# Patient Record
Sex: FEMALE | Race: WHITE | Employment: OTHER | URBAN - METROPOLITAN AREA
[De-identification: names, ages, dates, MRNs, and addresses within clinical notes are randomized per-mention and may not be internally consistent; named-entity substitution may affect disease eponyms.]

---

## 2017-08-16 ENCOUNTER — HOSPITAL ENCOUNTER (OUTPATIENT)
Dept: ULTRASOUND IMAGING | Age: 57
Discharge: HOME | End: 2017-08-16
Attending: INTERNAL MEDICINE
Payer: COMMERCIAL

## 2017-08-16 DIAGNOSIS — R10.13 EPIGASTRIC PAIN: ICD-10-CM

## 2017-08-16 PROCEDURE — 76700 US EXAM ABDOM COMPLETE: CPT

## 2017-08-22 NOTE — PROGRESS NOTES
Patient stated she is still having left sided pain that goes into her back. Pt stated she medication has provided no relief.

## 2017-10-26 ENCOUNTER — HOSPITAL ENCOUNTER (OUTPATIENT)
Dept: LAB | Age: 57
Discharge: HOME | End: 2017-10-26

## 2017-10-26 PROCEDURE — 88312 SPECIAL STAINS GROUP 1: CPT | Performed by: INTERNAL MEDICINE

## 2017-10-26 PROCEDURE — 88305 TISSUE EXAM BY PATHOLOGIST: CPT | Performed by: INTERNAL MEDICINE

## 2018-01-22 PROBLEM — J06.9 URI, ACUTE: Status: ACTIVE | Noted: 2018-01-22

## 2019-04-01 ENCOUNTER — HOSPITAL ENCOUNTER (OUTPATIENT)
Dept: MAMMOGRAPHY | Age: 59
Discharge: HOME | End: 2019-04-01
Attending: INTERNAL MEDICINE
Payer: COMMERCIAL

## 2019-04-01 DIAGNOSIS — M89.9 DISORDER OF BONE: ICD-10-CM

## 2019-04-01 PROCEDURE — 77080 DXA BONE DENSITY AXIAL: CPT

## 2019-12-03 ENCOUNTER — HOSPITAL ENCOUNTER (OUTPATIENT)
Dept: GENERAL RADIOLOGY | Age: 59
Discharge: HOME | End: 2019-12-03
Attending: ORTHOPAEDIC SURGERY
Payer: COMMERCIAL

## 2019-12-03 VITALS
HEART RATE: 82 BPM | RESPIRATION RATE: 16 BRPM | WEIGHT: 145 LBS | OXYGEN SATURATION: 98 % | SYSTOLIC BLOOD PRESSURE: 122 MMHG | HEIGHT: 66 IN | TEMPERATURE: 98.7 F | DIASTOLIC BLOOD PRESSURE: 78 MMHG | BODY MASS INDEX: 23.3 KG/M2

## 2019-12-03 DIAGNOSIS — Z96.612 PAIN DUE TO LEFT SHOULDER JOINT PROSTHESIS (HCC): ICD-10-CM

## 2019-12-03 DIAGNOSIS — T84.84XA PAIN DUE TO LEFT SHOULDER JOINT PROSTHESIS (HCC): ICD-10-CM

## 2019-12-03 DIAGNOSIS — Z96.612 PRESENCE OF LEFT ARTIFICIAL SHOULDER JOINT: ICD-10-CM

## 2019-12-03 LAB
APPEARANCE FLD: NORMAL
COLOR FLD: NORMAL
LYMPHOCYTES NFR BRONCH MANUAL: 24 %
NEUTROPHILS NFR BRONCH MANUAL: 76 %
NUC CELL # FLD: 3 /CU MM
RBC # FLD: NORMAL /CU MM
SPECIMEN SOURCE FLD: NORMAL

## 2019-12-03 PROCEDURE — 20610 DRAIN/INJ JOINT/BURSA W/O US: CPT

## 2019-12-03 PROCEDURE — 89050 BODY FLUID CELL COUNT: CPT

## 2019-12-03 PROCEDURE — 87205 SMEAR GRAM STAIN: CPT

## 2019-12-03 PROCEDURE — 74011000250 HC RX REV CODE- 250: Performed by: ORTHOPAEDIC SURGERY

## 2019-12-03 PROCEDURE — 87075 CULTR BACTERIA EXCEPT BLOOD: CPT

## 2019-12-03 RX ORDER — LIDOCAINE HYDROCHLORIDE 20 MG/ML
0.2 INJECTION, SOLUTION INFILTRATION; PERINEURAL
Status: COMPLETED | OUTPATIENT
Start: 2019-12-03 | End: 2019-12-03

## 2019-12-03 RX ORDER — BISMUTH SUBSALICYLATE 262 MG
1 TABLET,CHEWABLE ORAL DAILY
COMMUNITY

## 2019-12-03 RX ADMIN — LIDOCAINE HYDROCHLORIDE 5 ML: 20 INJECTION, SOLUTION INFILTRATION; PERINEURAL at 13:50

## 2019-12-05 LAB
BACTERIA SPEC CULT: NORMAL
GRAM STN SPEC: NORMAL
GRAM STN SPEC: NORMAL
SERVICE CMNT-IMP: NORMAL

## 2019-12-09 ENCOUNTER — HOSPITAL ENCOUNTER (OUTPATIENT)
Dept: LAB | Age: 59
Discharge: HOME | End: 2019-12-09
Payer: COMMERCIAL

## 2019-12-09 LAB
BASOPHILS # BLD: 0.1 K/UL (ref 0–0.2)
BASOPHILS NFR BLD: 1 % (ref 0–2)
CRP SERPL-MCNC: <0.3 MG/DL (ref 0–0.9)
DIFFERENTIAL METHOD BLD: ABNORMAL
EOSINOPHIL # BLD: 0.1 K/UL (ref 0–0.8)
EOSINOPHIL NFR BLD: 1 % (ref 0.5–7.8)
ERYTHROCYTE [DISTWIDTH] IN BLOOD BY AUTOMATED COUNT: 13.8 % (ref 11.9–14.6)
ERYTHROCYTE [SEDIMENTATION RATE] IN BLOOD: 23 MM/HR (ref 0–30)
HCT VFR BLD AUTO: 36.1 % (ref 35.8–46.3)
HGB BLD-MCNC: 11.8 G/DL (ref 11.7–15.4)
IMM GRANULOCYTES # BLD AUTO: 0 K/UL (ref 0–0.5)
IMM GRANULOCYTES NFR BLD AUTO: 0 % (ref 0–5)
LYMPHOCYTES # BLD: 2.4 K/UL (ref 0.5–4.6)
LYMPHOCYTES NFR BLD: 43 % (ref 13–44)
MCH RBC QN AUTO: 31.1 PG (ref 26.1–32.9)
MCHC RBC AUTO-ENTMCNC: 32.7 G/DL (ref 31.4–35)
MCV RBC AUTO: 95.3 FL (ref 79.6–97.8)
MONOCYTES # BLD: 0.4 K/UL (ref 0.1–1.3)
MONOCYTES NFR BLD: 7 % (ref 4–12)
NEUTS SEG # BLD: 2.6 K/UL (ref 1.7–8.2)
NEUTS SEG NFR BLD: 47 % (ref 43–78)
NRBC # BLD: 0 K/UL (ref 0–0.2)
PLATELET # BLD AUTO: 277 K/UL (ref 150–450)
PMV BLD AUTO: 9.7 FL (ref 9.4–12.3)
RBC # BLD AUTO: 3.79 M/UL (ref 4.05–5.2)
WBC # BLD AUTO: 5.4 K/UL (ref 4.3–11.1)

## 2019-12-09 PROCEDURE — 86140 C-REACTIVE PROTEIN: CPT

## 2019-12-09 PROCEDURE — 85652 RBC SED RATE AUTOMATED: CPT

## 2019-12-09 PROCEDURE — 85025 COMPLETE CBC W/AUTO DIFF WBC: CPT

## 2019-12-09 PROCEDURE — 36415 COLL VENOUS BLD VENIPUNCTURE: CPT

## 2019-12-27 LAB
BACTERIA SPEC CULT: NORMAL
SERVICE CMNT-IMP: NORMAL

## 2020-01-30 ENCOUNTER — HOSPITAL ENCOUNTER (OUTPATIENT)
Dept: SURGERY | Age: 60
Discharge: HOME | End: 2020-01-30
Payer: COMMERCIAL

## 2020-01-30 VITALS
HEIGHT: 66 IN | TEMPERATURE: 96.9 F | SYSTOLIC BLOOD PRESSURE: 112 MMHG | BODY MASS INDEX: 24.91 KG/M2 | RESPIRATION RATE: 18 BRPM | OXYGEN SATURATION: 95 % | DIASTOLIC BLOOD PRESSURE: 56 MMHG | WEIGHT: 155 LBS | HEART RATE: 72 BPM

## 2020-01-30 PROBLEM — Z96.612 PAIN DUE TO LEFT SHOULDER JOINT PROSTHESIS (HCC): Status: ACTIVE | Noted: 2020-01-30

## 2020-01-30 PROBLEM — T84.84XA PAIN DUE TO LEFT SHOULDER JOINT PROSTHESIS (HCC): Status: ACTIVE | Noted: 2020-01-30

## 2020-01-30 PROBLEM — Z96.612 S/P REVERSE TOTAL SHOULDER ARTHROPLASTY, LEFT: Status: ACTIVE | Noted: 2020-01-30

## 2020-01-30 LAB
ALBUMIN SERPL-MCNC: 4 G/DL (ref 3.5–5)
ALBUMIN/GLOB SERPL: 1.1 {RATIO} (ref 1.2–3.5)
ALP SERPL-CCNC: 77 U/L (ref 50–136)
ALT SERPL-CCNC: 21 U/L (ref 12–65)
ANION GAP SERPL CALC-SCNC: 4 MMOL/L (ref 7–16)
APPEARANCE UR: CLEAR
APTT PPP: 28.5 SEC (ref 24.7–39.8)
AST SERPL-CCNC: 19 U/L (ref 15–37)
BACTERIA SPEC CULT: ABNORMAL
BILIRUB SERPL-MCNC: 0.3 MG/DL (ref 0.2–1.1)
BILIRUB UR QL: NEGATIVE
BUN SERPL-MCNC: 14 MG/DL (ref 6–23)
CALCIUM SERPL-MCNC: 9.4 MG/DL (ref 8.3–10.4)
CHLORIDE SERPL-SCNC: 105 MMOL/L (ref 98–107)
CO2 SERPL-SCNC: 27 MMOL/L (ref 21–32)
COLOR UR: YELLOW
CREAT SERPL-MCNC: 0.88 MG/DL (ref 0.6–1)
CRP SERPL-MCNC: <0.3 MG/DL (ref 0–0.9)
ERYTHROCYTE [DISTWIDTH] IN BLOOD BY AUTOMATED COUNT: 13.4 % (ref 11.9–14.6)
ERYTHROCYTE [SEDIMENTATION RATE] IN BLOOD: 22 MM/HR (ref 0–30)
GLOBULIN SER CALC-MCNC: 3.7 G/DL (ref 2.3–3.5)
GLUCOSE SERPL-MCNC: 102 MG/DL (ref 65–100)
GLUCOSE UR STRIP.AUTO-MCNC: NEGATIVE MG/DL
HCT VFR BLD AUTO: 35.4 % (ref 35.8–46.3)
HGB BLD-MCNC: 11.8 G/DL (ref 11.7–15.4)
HGB UR QL STRIP: NEGATIVE
INR PPP: 0.9
KETONES UR QL STRIP.AUTO: NEGATIVE MG/DL
LEUKOCYTE ESTERASE UR QL STRIP.AUTO: NEGATIVE
MAGNESIUM SERPL-MCNC: 2.3 MG/DL (ref 1.8–2.4)
MCH RBC QN AUTO: 31.4 PG (ref 26.1–32.9)
MCHC RBC AUTO-ENTMCNC: 33.3 G/DL (ref 31.4–35)
MCV RBC AUTO: 94.1 FL (ref 79.6–97.8)
NITRITE UR QL STRIP.AUTO: NEGATIVE
NRBC # BLD: 0 K/UL (ref 0–0.2)
PH UR STRIP: 5.5 [PH] (ref 5–9)
PLATELET # BLD AUTO: 282 K/UL (ref 150–450)
PMV BLD AUTO: 9.7 FL (ref 9.4–12.3)
POTASSIUM SERPL-SCNC: 3.6 MMOL/L (ref 3.5–5.1)
PROT SERPL-MCNC: 7.7 G/DL (ref 6.3–8.2)
PROT UR STRIP-MCNC: NEGATIVE MG/DL
PROTHROMBIN TIME: 12.7 SEC (ref 11.7–14.5)
RBC # BLD AUTO: 3.76 M/UL (ref 4.05–5.2)
SERVICE CMNT-IMP: ABNORMAL
SODIUM SERPL-SCNC: 136 MMOL/L (ref 136–145)
SP GR UR REFRACTOMETRY: 1.02 (ref 1–1.02)
UROBILINOGEN UR QL STRIP.AUTO: 0.2 EU/DL (ref 0.2–1)
WBC # BLD AUTO: 6 K/UL (ref 4.3–11.1)

## 2020-01-30 PROCEDURE — 85027 COMPLETE CBC AUTOMATED: CPT

## 2020-01-30 PROCEDURE — 87641 MR-STAPH DNA AMP PROBE: CPT

## 2020-01-30 PROCEDURE — 86140 C-REACTIVE PROTEIN: CPT

## 2020-01-30 PROCEDURE — 85610 PROTHROMBIN TIME: CPT

## 2020-01-30 PROCEDURE — 83735 ASSAY OF MAGNESIUM: CPT

## 2020-01-30 PROCEDURE — 85730 THROMBOPLASTIN TIME PARTIAL: CPT

## 2020-01-30 PROCEDURE — 85652 RBC SED RATE AUTOMATED: CPT

## 2020-01-30 PROCEDURE — 80053 COMPREHEN METABOLIC PANEL: CPT

## 2020-01-30 PROCEDURE — 77030027138 HC INCENT SPIROMETER -A

## 2020-01-30 PROCEDURE — 81003 URINALYSIS AUTO W/O SCOPE: CPT

## 2020-01-30 RX ORDER — ACETAMINOPHEN 325 MG/1
500 TABLET ORAL
COMMUNITY

## 2020-01-30 NOTE — PERIOP NOTES
The below lab results are within anesthesia limits, no further action is required. Recent Results (from the past 12 hour(s))   CBC W/O DIFF    Collection Time: 01/30/20  1:13 PM   Result Value Ref Range    WBC 6.0 4.3 - 11.1 K/uL    RBC 3.76 (L) 4.05 - 5.2 M/uL    HGB 11.8 11.7 - 15.4 g/dL    HCT 35.4 (L) 35.8 - 46.3 %    MCV 94.1 79.6 - 97.8 FL    MCH 31.4 26.1 - 32.9 PG    MCHC 33.3 31.4 - 35.0 g/dL    RDW 13.4 11.9 - 14.6 %    PLATELET 480 403 - 938 K/uL    MPV 9.7 9.4 - 12.3 FL    ABSOLUTE NRBC 0.00 0.0 - 0.2 K/uL   MAGNESIUM    Collection Time: 01/30/20  1:13 PM   Result Value Ref Range    Magnesium 2.3 1.8 - 2.4 mg/dL   METABOLIC PANEL, COMPREHENSIVE    Collection Time: 01/30/20  1:13 PM   Result Value Ref Range    Sodium 136 136 - 145 mmol/L    Potassium 3.6 3.5 - 5.1 mmol/L    Chloride 105 98 - 107 mmol/L    CO2 27 21 - 32 mmol/L    Anion gap 4 (L) 7 - 16 mmol/L    Glucose 102 (H) 65 - 100 mg/dL    BUN 14 6 - 23 MG/DL    Creatinine 0.88 0.6 - 1.0 MG/DL    GFR est AA >60 >60 ml/min/1.73m2    GFR est non-AA >60 >60 ml/min/1.73m2    Calcium 9.4 8.3 - 10.4 MG/DL    Bilirubin, total 0.3 0.2 - 1.1 MG/DL    ALT (SGPT) 21 12 - 65 U/L    AST (SGOT) 19 15 - 37 U/L    Alk.  phosphatase 77 50 - 136 U/L    Protein, total 7.7 6.3 - 8.2 g/dL    Albumin 4.0 3.5 - 5.0 g/dL    Globulin 3.7 (H) 2.3 - 3.5 g/dL    A-G Ratio 1.1 (L) 1.2 - 3.5     PROTHROMBIN TIME + INR    Collection Time: 01/30/20  1:13 PM   Result Value Ref Range    Prothrombin time 12.7 11.7 - 14.5 sec    INR 0.9     PTT    Collection Time: 01/30/20  1:13 PM   Result Value Ref Range    aPTT 28.5 24.7 - 39.8 SEC   URINALYSIS W/ RFLX MICROSCOPIC    Collection Time: 01/30/20  1:13 PM   Result Value Ref Range    Color YELLOW      Appearance CLEAR      Specific gravity 1.025 (H) 1.001 - 1.023      pH (UA) 5.5 5.0 - 9.0      Protein NEGATIVE  NEG mg/dL    Glucose NEGATIVE  mg/dL    Ketone NEGATIVE  NEG mg/dL    Bilirubin NEGATIVE  NEG      Blood NEGATIVE  NEG Urobilinogen 0.2 0.2 - 1.0 EU/dL    Nitrites NEGATIVE  NEG      Leukocyte Esterase NEGATIVE  NEG     C REACTIVE PROTEIN, QT    Collection Time: 01/30/20  1:13 PM   Result Value Ref Range    C-Reactive protein <0.3 0.0 - 0.9 mg/dL

## 2020-01-30 NOTE — PERIOP NOTES
Patient verified name and . Order for consent found in EHR and matches case posting; patient verified. Type 3 surgery, PAT joint assessment complete. Labs per surgeon: CBC,Mg, CMP, PT/PTT, UA, C Reactive Protein, Sed Rate; results pending. Hemoglobin A1c DOS and POC glucose DOS; orders signed and held in EHR. T&C 2 units; order signed and held in EHR. Labs per anesthesia protocol: no additional  EKG: None per anesthesia protocol. Patient instructed to come to outpatient lab, [place] [address] [address], on 20 (Wednesday), between 0800 and 1600 to have lab work completed. Do NOT remove green armband once placed. Patient verbalized understanding. Patient received written and verbal instructions on incentive spirometer use. Patient returned successful demonstration. Patient instructed to bring incentive spirometer to the hospital on the DOS. MRSA/MSSA swab collected; pharmacy to review and dose antibiotic as appropriate. Hospital approved surgical skin cleanser and instructions to return bottle on DOS given per hospital policy. Patient provided with handouts including Guide to Surgery, Pain Management, Hand Hygiene, Blood Transfusion Education, and [facility] Anesthesia Brochure. Patient answered medical/surgical history questions at their best of ability. All prior to admission medications documented in [facility]. Original medication prescription bottle NOT visualized during patient appointment. Patient instructed to hold all vitamins, supplements, herbals 3 weeks prior to surgery and NSAIDS 5 days prior to surgery. Patient teach back successful and patient demonstrates knowledge of instruction.

## 2020-01-30 NOTE — BRIEF OP NOTE
BRIEF OPERATIVE NOTE    Date of Procedure: 2/6/2020     Preoperative Diagnosis:  PAINFUL REVERSE LEFT TOTAL SHOULDER ARTHROPLASTY     Postoperative Diagnosis:  SAME    Procedure(s): 1. INCISION, DEBRIDEMENT, REMOVAL IMPLANT LEFT SHOULDER \"REGINA REVERSE LEFT TSA\" WITH PROXIMAL HUMERAL OSTEOTOMY     2.  REVISION REVERSE LEFT TOTAL SHOULDER ARTHROPLASTY WITH LONG STEM DELTA EXTEND PROSTHESIS, BICEPS TENODESIS, LATISSIMUS DORSI AND TERES MAJOR TENDON TRANSFERS    Surgeon(s) and Role:     * Angel Diaz MD - Primary         Assistant Staff:  Izaiah Lombardi NP      Surgical Staff:  Circ-1: (Unknown)  Scrub Tech-1: (Unknown)  Scrub Tech-2: (Unknown)  Scrub Tech-3: (Unknown)  No case tracking events are documented in the log. Anesthesia:  GENERAL WITH INTERSCALENE BLOCK    Estimated Blood Loss: 300 cc. Complications: NONE    Implants:   Implant Name Type Inv.  Item Serial No.  Lot No. LRB No. Used Action   COMP METAGLENE LNG PEG +10MM -- DELTA XTEND - [id number]  COMP METAGLENE LNG PEG +10MM -- DELTA XTEND  [facility] [employer] [id number] Left 1 Implanted   COMPNT GLENOSPHRE ECC D42MM +4 -- DELTA XTEND - [id number]  COMPNT GLENOSPHRE ECC D42MM +4 -- DELTA XTEND  [facility] [employer] [id number] Left 1 Implanted   CEMENT BONE SIMPLEX P 1/PACK - [id number]  CEMENT BONE SIMPLEX P 1/PACK  [employer] [facility] [id number] Left 2 Implanted   SPACER HUM +9MM DELTA XTEND --  - [id number]  SPACER HUM +9MM DELTA XTEND --   [facility] [employer] [id number] Left 1 Implanted   STEM HUM MBLOC EPI LNG SZ2 10M -- DELTA XTEND - [id number]  STEM HUM MBLOC EPI LNG SZ2 10M -- DELTA XTEND  [facility] [employer] [id number] Left 1 Implanted   RSTRCTR ETHAN BNE BIOABSRB 8MM -- Ross Poor  RSTRCTR ETHAN BNE BIOABSRB 8MM -- BIOSTOP G  [facility] [employer] [id number] Left 1 Implanted   SPACER HUM +9MM DELTA XTEND --  - [id number]  SPACER HUM +9MM DELTA XTEND --   [employer] [id number] Left 1 Implanted   SPACER HUM +9MM Blade Salgado --  - [id number]  SPACER HUM +9MM DELTA Lela Rosemarie   [facility] [employer]THOPED[employer] [id number] Left 1 Implanted   CUP HUM STD DELT PE 42+9MM -- Blade Salgado - [id number]  CUP HUM STD DELT PE 42+9MM -- [id number] [id number] Left 1 Implanted       Ru Rivera MD

## 2020-01-30 NOTE — H&P
[facility] HISTORY AND PHYSICAL    Subjective:     Patient is a 63 y.o. RHD FEMALE WITH LEFT SHOULDER PAIN. SEE OFFICE NOTE. Patient Active Problem List    Diagnosis Date Noted    Pain due to left shoulder joint prosthesis ([address] 01/30/2020    S/P reverse total shoulder arthroplasty, left 01/30/2020    URI, acute 01/22/2018    Hypertrophy of nasal turbinates     Deviated nasal septum     Chronic sinusitis     TMJ (temporomandibular joint disorder)     Tinnitus     SNHL (sensorineural hearing loss)     Otalgia     Migraine headache     High frequency hearing loss     Eustachian tube dysfunction     Allergic rhinitis 03/07/2013    Fatigue 03/07/2013     Past Medical History:   Diagnosis Date    Allergic rhinitis 3/7/2013    Chronic sinusitis     Deviated nasal septum     Eustachian tube dysfunction     Fatigue 3/7/2013    High frequency hearing loss     Hypertrophy of nasal turbinates     Migraine headache     Otalgia     SNHL (sensorineural hearing loss)     Tinnitus     intermittent    TMJ (temporomandibular joint disorder)       Past Surgical History:   Procedure Laterality Date    HX GYN  2009    uterine ablation      Prior to Admission medications    Medication Sig Start Date End Date Taking? Authorizing Provider   multivitamin (ONE A DAY) tablet Take 1 Tab by mouth daily. Provider, Historical   calcium carb/vitamin D2/soyb (ONE-A-DAY BONE STRENGTH PO) Take  by mouth. Provider, Historical   nutritional supplement (BONES & JOINTS PO) Take  by mouth. Provider, Historical     Allergies   Allergen Reactions    Codeine Other (comments)     Headaches      Social History     Tobacco Use    Smoking status: Never Smoker    Smokeless tobacco: Never Used   Substance Use Topics    Alcohol use:  Yes     Alcohol/week: 0.8 standard drinks     Types: 1 Glasses of wine per week     Comment: monthly; occasional      Family History   Problem Relation Age of Onset    Hypertension Father     Heart Disease Father     Elevated Lipids Father     Diabetes Father     Cancer Father         leukemia/lung-smoker    Hypertension Brother     Diabetes Brother     Elevated Lipids Brother     Liver Disease Mother         autoimmune hepatitis      Review of Systems  A comprehensive review of systems was negative except for that written in the HPI. Objective:     No data found. Visit Vitals  Ht 5' 6\" (1.676 m)   Wt 69.9 kg (154 lb)   BMI 24.86 kg/m²     General:  Alert, cooperative, no distress, appears stated age. Head:  Normocephalic, without obvious abnormality, atraumatic. Back:   Symmetric, no curvature. ROM normal. No CVA tenderness. Lungs:   Clear to auscultation bilaterally. Chest wall:  No tenderness or deformity. Heart:  Regular rate and rhythm, S1, S2 normal, no murmur, click, rub or gallop. Extremities: Extremities normal, atraumatic, no cyanosis or edema. Pulses: 2+ and symmetric all extremities. Skin: Skin color, texture, turgor normal. No rashes or lesions. Lymph nodes: Cervical, supraclavicular, and axillary nodes normal.   Neurologic: CNII-XII intact. Normal strength, sensation and reflexes throughout. Assessment:     Principal Problem:    Pain due to left shoulder joint prosthesis (HCC) (1/30/2020)    Active Problems:    S/P reverse total shoulder arthroplasty, left (1/30/2020)        Plan:     The various methods of treatment have been discussed with the patient and family. PATIENT HAS EXHAUSTED NON-OPERATIVE MODALITIES     After consideration of risks, benefits and other options for treatment, the patient has consented to surgical intervention.     SEE OFFICE NOTE    Angus Leon MD

## 2020-01-30 NOTE — PERIOP NOTES
PLEASE CONTINUE TAKING ALL PRESCRIPTION MEDICATIONS UP TO THE DAY OF SURGERY UNLESS OTHERWISE DIRECTED BELOW. DISCONTINUE all vitamins, herbals and supplements 21 days prior to surgery. DISCONTINUE Non-Steriodal Anti-Inflammatory (NSAIDS) such as Advil, Ibuprofen, and Aleve 5 days prior to surgery. Home Medications to HOLD      All vitamins, supplements, and herbals stop NOW. All NSAIDs such as Advil, Aleve, Ibuprofen, Diclofenac, Naproxen, etc. Stop 5 days prior to surgery. Home Medications to take  the day of surgery   Tylenol if needed        Comments   Please bring bottle of soap (Dynahex) and incentive spirometer to the hospital.    Please come to outpatient lab, [address], on 2/5/20 (Wednesday) between 0800 and 1600 to have lab work completed. Do NOT remove green armband once placed. Please do not bring home medications with you on the day of surgery unless otherwise directed by your nurse. If you are instructed to bring home medications, please give them to your nurse as they will be administered by the nursing staff. If you have any questions, please call [facility] [phone] or [facility] [phone]. Copy of above instructions given to patient.

## 2020-02-04 NOTE — ADVANCED PRACTICE NURSE
Total Joint Surgery Preoperative Chart Review      Patient ID:  Frances Roman  [id number]  64 y.o.  1960  Surgeon: Dr. Karthik Garcia  Date of Surgery: 2/6/2020  Procedure: Total Left Shoulder Arthroplasty  Primary Care Physician: Dinora Cruz [place] [phone]  Specialty Physician(s):      Subjective:   Frances Roman is a 63 y.o. WHITE OR  female who presents for preoperative evaluation for Total Left Shoulder arthroplasty. This is a preoperative chart review note based on data collected by the nurse at the surgical Pre-Assessment visit. Past Medical History:   Diagnosis Date    Allergic rhinitis 3/7/2013    Chronic sinusitis     Deviated nasal septum     Eustachian tube dysfunction     Fatigue 3/7/2013    High frequency hearing loss     Hypertrophy of nasal turbinates     Migraine headache     Otalgia     SNHL (sensorineural hearing loss)     Tinnitus     intermittent    TMJ (temporomandibular joint disorder)       Past Surgical History:   Procedure Laterality Date    HX GYN  2009    uterine ablation    HX ORTHOPAEDIC Left 2018    arm/shoulder     Family History   Problem Relation Age of Onset    Hypertension Father     Heart Disease Father     Elevated Lipids Father     Diabetes Father     Cancer Father         leukemia/lung-smoker    Hypertension Brother     Diabetes Brother     Elevated Lipids Brother     Liver Disease Mother         autoimmune hepatitis      Social History     Tobacco Use    Smoking status: Never Smoker    Smokeless tobacco: Never Used   Substance Use Topics    Alcohol use: Never     Alcohol/week: 0.8 standard drinks     Types: 1 Glasses of wine per week     Frequency: Never       Prior to Admission medications    Medication Sig Start Date End Date Taking? Authorizing Provider   acetaminophen (TYLENOL) 325 mg tablet Take  by mouth every four (4) hours as needed for Pain.    Yes Provider, Historical   multivitamin (ONE A DAY) tablet Take 1 Tab by mouth daily. Yes Provider, Historical   calcium carb/vitamin D2/soyb (ONE-A-DAY BONE STRENGTH PO) Take  by mouth. Yes Provider, Historical   nutritional supplement (BONES & JOINTS PO) Take  by mouth. Yes Provider, Historical     Allergies   Allergen Reactions    Codeine Other (comments)     Headaches          Objective:     Physical Exam:   No data found. ECG:    EKG Results     None          Data Review:   Labs:   Results for Pato Layne (MRN [id number]) as of 2/4/2020 09:04   Ref. Range 1/30/2020 13:13   Sodium Latest Ref Range: 136 - 145 mmol/L 136   Potassium Latest Ref Range: 3.5 - 5.1 mmol/L 3.6   Chloride Latest Ref Range: 98 - 107 mmol/L 105   CO2 Latest Ref Range: 21 - 32 mmol/L 27   Anion gap Latest Ref Range: 7 - 16 mmol/L 4 (L)   Glucose Latest Ref Range: 65 - 100 mg/dL 102 (H)   BUN Latest Ref Range: 6 - 23 MG/DL 14   Creatinine Latest Ref Range: 0.6 - 1.0 MG/DL 0.88   Calcium Latest Ref Range: 8.3 - 10.4 MG/DL 9.4   Magnesium Latest Ref Range: 1.8 - 2.4 mg/dL 2.3   GFR est non-AA Latest Ref Range: >60 ml/min/1.73m2 >60   GFR est AA Latest Ref Range: >60 ml/min/1.73m2 >60         Problem List:  )  Patient Active Problem List   Diagnosis Code    Allergic rhinitis J30.9    Fatigue R53.83    Hypertrophy of nasal turbinates J34.3    Deviated nasal septum J34.2    Chronic sinusitis J32.9    TMJ (temporomandibular joint disorder) M26.609    Tinnitus H93.19    SNHL (sensorineural hearing loss) H90.5    Otalgia H92.09    Migraine headache G43.909    High frequency hearing loss H91.90    Eustachian tube dysfunction H69.80    URI, acute J06.9    Pain due to left shoulder joint prosthesis (HCC) T84.84XA, K42.639    S/P reverse total shoulder arthroplasty, left F51.833       Total Joint Surgery Pre-Assessment Recommendations:           Recommend continuous saturation monitoring during hospitalization. PEP therapy BID.         Signed By: LYNSEY Husain    February 4, 2020

## 2020-02-05 ENCOUNTER — ANESTHESIA EVENT (OUTPATIENT)
Dept: SURGERY | Age: 60
DRG: 483 | End: 2020-02-05
Payer: COMMERCIAL

## 2020-02-05 ENCOUNTER — HOSPITAL ENCOUNTER (OUTPATIENT)
Dept: LAB | Age: 60
Discharge: HOME | End: 2020-02-05
Payer: COMMERCIAL

## 2020-02-05 PROCEDURE — 86900 BLOOD TYPING SEROLOGIC ABO: CPT

## 2020-02-05 PROCEDURE — 36415 COLL VENOUS BLD VENIPUNCTURE: CPT

## 2020-02-05 PROCEDURE — 86923 COMPATIBILITY TEST ELECTRIC: CPT

## 2020-02-06 ENCOUNTER — ANESTHESIA (OUTPATIENT)
Dept: SURGERY | Age: 60
DRG: 483 | End: 2020-02-06
Payer: COMMERCIAL

## 2020-02-06 ENCOUNTER — HOSPITAL ENCOUNTER (INPATIENT)
Age: 60
LOS: 2 days | Discharge: HOME HEALTH | DRG: 483 | End: 2020-02-08
Attending: ORTHOPAEDIC SURGERY | Admitting: ORTHOPAEDIC SURGERY
Payer: COMMERCIAL

## 2020-02-06 ENCOUNTER — APPOINTMENT (OUTPATIENT)
Dept: GENERAL RADIOLOGY | Age: 60
DRG: 483 | End: 2020-02-06
Attending: ORTHOPAEDIC SURGERY
Payer: COMMERCIAL

## 2020-02-06 PROBLEM — M12.812 ARTHROPATHY, TRANSIENT, SHOULDER, LEFT: Status: ACTIVE | Noted: 2020-02-06

## 2020-02-06 PROBLEM — T84.84XA ARTIFICIAL JOINT PAIN (HCC): Status: ACTIVE | Noted: 2020-02-06

## 2020-02-06 LAB
EST. AVERAGE GLUCOSE BLD GHB EST-MCNC: 123 MG/DL
GLUCOSE BLD STRIP.AUTO-MCNC: 85 MG/DL (ref 65–100)
HBA1C MFR BLD: 5.9 %
POTASSIUM BLD-SCNC: 3.8 MMOL/L (ref 3.5–5.1)

## 2020-02-06 PROCEDURE — 0RPK0JZ REMOVAL OF SYNTHETIC SUBSTITUTE FROM LEFT SHOULDER JOINT, OPEN APPROACH: ICD-10-PCS | Performed by: ORTHOPAEDIC SURGERY

## 2020-02-06 PROCEDURE — 74011250636 HC RX REV CODE- 250/636: Performed by: ORTHOPAEDIC SURGERY

## 2020-02-06 PROCEDURE — 74011250636 HC RX REV CODE- 250/636: Performed by: NURSE ANESTHETIST, CERTIFIED REGISTERED

## 2020-02-06 PROCEDURE — 77030002986 HC SUT PROL J&J -A: Performed by: ORTHOPAEDIC SURGERY

## 2020-02-06 PROCEDURE — 77030004434 HC BUR RND STRY -B: Performed by: ORTHOPAEDIC SURGERY

## 2020-02-06 PROCEDURE — 65270000029 HC RM PRIVATE

## 2020-02-06 PROCEDURE — 74011250637 HC RX REV CODE- 250/637: Performed by: NURSE PRACTITIONER

## 2020-02-06 PROCEDURE — C1776 JOINT DEVICE (IMPLANTABLE): HCPCS | Performed by: ORTHOPAEDIC SURGERY

## 2020-02-06 PROCEDURE — 77030002937 HC SUT MERS J&J -B: Performed by: ORTHOPAEDIC SURGERY

## 2020-02-06 PROCEDURE — 0RRK00Z REPLACEMENT OF LEFT SHOULDER JOINT WITH REVERSE BALL AND SOCKET SYNTHETIC SUBSTITUTE, OPEN APPROACH: ICD-10-PCS | Performed by: ORTHOPAEDIC SURGERY

## 2020-02-06 PROCEDURE — 74011000250 HC RX REV CODE- 250: Performed by: NURSE ANESTHETIST, CERTIFIED REGISTERED

## 2020-02-06 PROCEDURE — 87075 CULTR BACTERIA EXCEPT BLOOD: CPT

## 2020-02-06 PROCEDURE — 77030039425 HC BLD LARYNG TRULITE DISP TELE -A: Performed by: ANESTHESIOLOGY

## 2020-02-06 PROCEDURE — 74011250636 HC RX REV CODE- 250/636: Performed by: ANESTHESIOLOGY

## 2020-02-06 PROCEDURE — 74011000250 HC RX REV CODE- 250: Performed by: ANESTHESIOLOGY

## 2020-02-06 PROCEDURE — 73030 X-RAY EXAM OF SHOULDER: CPT

## 2020-02-06 PROCEDURE — C1713 ANCHOR/SCREW BN/BN,TIS/BN: HCPCS | Performed by: ORTHOPAEDIC SURGERY

## 2020-02-06 PROCEDURE — 77030037088 HC TUBE ENDOTRACH ORAL NSL COVD-A: Performed by: ANESTHESIOLOGY

## 2020-02-06 PROCEDURE — 82962 GLUCOSE BLOOD TEST: CPT

## 2020-02-06 PROCEDURE — 77030003827 HC BIT DRL J&J -B: Performed by: ORTHOPAEDIC SURGERY

## 2020-02-06 PROCEDURE — 77030003602 HC NDL NRV BLK BBMI -B: Performed by: ANESTHESIOLOGY

## 2020-02-06 PROCEDURE — 76210000017 HC OR PH I REC 1.5 TO 2 HR: Performed by: ORTHOPAEDIC SURGERY

## 2020-02-06 PROCEDURE — 77030031139 HC SUT VCRL2 J&J -A: Performed by: ORTHOPAEDIC SURGERY

## 2020-02-06 PROCEDURE — 83036 HEMOGLOBIN GLYCOSYLATED A1C: CPT

## 2020-02-06 PROCEDURE — 76060000036 HC ANESTHESIA 2.5 TO 3 HR: Performed by: ORTHOPAEDIC SURGERY

## 2020-02-06 PROCEDURE — 76942 ECHO GUIDE FOR BIOPSY: CPT | Performed by: ORTHOPAEDIC SURGERY

## 2020-02-06 PROCEDURE — 88331 PATH CONSLTJ SURG 1 BLK 1SPC: CPT

## 2020-02-06 PROCEDURE — 76010010054 HC POST OP PAIN BLOCK: Performed by: ORTHOPAEDIC SURGERY

## 2020-02-06 PROCEDURE — 76010000172 HC OR TIME 2.5 TO 3 HR INTENSV-TIER 1: Performed by: ORTHOPAEDIC SURGERY

## 2020-02-06 PROCEDURE — 77030011283 HC ELECTRD NDL COVD -A: Performed by: ORTHOPAEDIC SURGERY

## 2020-02-06 PROCEDURE — 88305 TISSUE EXAM BY PATHOLOGIST: CPT

## 2020-02-06 PROCEDURE — 77030002913 HC SUT ETHBND J&J -B: Performed by: ORTHOPAEDIC SURGERY

## 2020-02-06 PROCEDURE — 84132 ASSAY OF SERUM POTASSIUM: CPT

## 2020-02-06 PROCEDURE — 77030018846 HC SOL IRR STRL H20 ICUM -A: Performed by: ORTHOPAEDIC SURGERY

## 2020-02-06 PROCEDURE — 77030012547: Performed by: ORTHOPAEDIC SURGERY

## 2020-02-06 PROCEDURE — 77030040922 HC BLNKT HYPOTHRM STRY -A: Performed by: ANESTHESIOLOGY

## 2020-02-06 PROCEDURE — 77030013708 HC HNDPC SUC IRR PULS STRY –B: Performed by: ORTHOPAEDIC SURGERY

## 2020-02-06 PROCEDURE — 94762 N-INVAS EAR/PLS OXIMTRY CONT: CPT

## 2020-02-06 PROCEDURE — 77030035643 HC BLD SAW OSC PRECIS STRY -C: Performed by: ORTHOPAEDIC SURGERY

## 2020-02-06 PROCEDURE — 94760 N-INVAS EAR/PLS OXIMETRY 1: CPT

## 2020-02-06 PROCEDURE — 77010033678 HC OXYGEN DAILY

## 2020-02-06 PROCEDURE — 87205 SMEAR GRAM STAIN: CPT

## 2020-02-06 PROCEDURE — 77030018836 HC SOL IRR NACL ICUM -A: Performed by: ORTHOPAEDIC SURGERY

## 2020-02-06 DEVICE — SPACER HUM +9MM OFFSET SHLDR POLYETH FOR DELT XTEND REV SYS: Type: IMPLANTABLE DEVICE | Site: SHOULDER | Status: FUNCTIONAL

## 2020-02-06 DEVICE — COMPONENT GLENOSPHERE ECCENTRIC XTEND 42MM PLUS 4MM: Type: IMPLANTABLE DEVICE | Site: SHOULDER | Status: FUNCTIONAL

## 2020-02-06 DEVICE — CUP HUM DIA42MM +9MM OFFSET STD SHLDR POLYETH DELT XTEND: Type: IMPLANTABLE DEVICE | Site: SHOULDER | Status: FUNCTIONAL

## 2020-02-06 DEVICE — RESTRICTOR CEM DIA8MM UNIV FEM CNL UHMWPE BIOSTP G: Type: IMPLANTABLE DEVICE | Site: SHOULDER | Status: FUNCTIONAL

## 2020-02-06 DEVICE — COMPONENT GLEN FIX DIA10MM SHLDR METAGLENE LNG PEG GLOB: Type: IMPLANTABLE DEVICE | Site: SHOULDER | Status: FUNCTIONAL

## 2020-02-06 DEVICE — STEM HUM SZ 2 L207MM DIA10MM 155DEG LNG SHLDR CO CHROME HA: Type: IMPLANTABLE DEVICE | Site: SHOULDER | Status: FUNCTIONAL

## 2020-02-06 DEVICE — CEMENT BNE 20ML 41GM FULL DOSE PMMA W/ TOBRA M VISC RADPQ: Type: IMPLANTABLE DEVICE | Site: SHOULDER | Status: FUNCTIONAL

## 2020-02-06 RX ORDER — NEOSTIGMINE METHYLSULFATE 1 MG/ML
INJECTION, SOLUTION INTRAVENOUS AS NEEDED
Status: DISCONTINUED | OUTPATIENT
Start: 2020-02-06 | End: 2020-02-06 | Stop reason: HOSPADM

## 2020-02-06 RX ORDER — SODIUM CHLORIDE 0.9 % (FLUSH) 0.9 %
5-40 SYRINGE (ML) INJECTION AS NEEDED
Status: DISCONTINUED | OUTPATIENT
Start: 2020-02-06 | End: 2020-02-08 | Stop reason: HOSPADM

## 2020-02-06 RX ORDER — SODIUM CHLORIDE 0.9 % (FLUSH) 0.9 %
5-40 SYRINGE (ML) INJECTION EVERY 8 HOURS
Status: CANCELLED | OUTPATIENT
Start: 2020-02-06

## 2020-02-06 RX ORDER — PROPOFOL 10 MG/ML
INJECTION, EMULSION INTRAVENOUS AS NEEDED
Status: DISCONTINUED | OUTPATIENT
Start: 2020-02-06 | End: 2020-02-06 | Stop reason: HOSPADM

## 2020-02-06 RX ORDER — DEXAMETHASONE SODIUM PHOSPHATE 4 MG/ML
INJECTION, SOLUTION INTRA-ARTICULAR; INTRALESIONAL; INTRAMUSCULAR; INTRAVENOUS; SOFT TISSUE AS NEEDED
Status: DISCONTINUED | OUTPATIENT
Start: 2020-02-06 | End: 2020-02-06 | Stop reason: HOSPADM

## 2020-02-06 RX ORDER — OXYCODONE AND ACETAMINOPHEN 5; 325 MG/1; MG/1
1 TABLET ORAL AS NEEDED
Status: DISCONTINUED | OUTPATIENT
Start: 2020-02-06 | End: 2020-02-06

## 2020-02-06 RX ORDER — SODIUM CHLORIDE, SODIUM LACTATE, POTASSIUM CHLORIDE, CALCIUM CHLORIDE 600; 310; 30; 20 MG/100ML; MG/100ML; MG/100ML; MG/100ML
75 INJECTION, SOLUTION INTRAVENOUS CONTINUOUS
Status: DISCONTINUED | OUTPATIENT
Start: 2020-02-06 | End: 2020-02-06

## 2020-02-06 RX ORDER — GLYCOPYRROLATE 0.2 MG/ML
INJECTION INTRAMUSCULAR; INTRAVENOUS AS NEEDED
Status: DISCONTINUED | OUTPATIENT
Start: 2020-02-06 | End: 2020-02-06 | Stop reason: HOSPADM

## 2020-02-06 RX ORDER — LIDOCAINE HYDROCHLORIDE 10 MG/ML
0.1 INJECTION INFILTRATION; PERINEURAL AS NEEDED
Status: DISCONTINUED | OUTPATIENT
Start: 2020-02-06 | End: 2020-02-06 | Stop reason: HOSPADM

## 2020-02-06 RX ORDER — LIDOCAINE HYDROCHLORIDE 20 MG/ML
INJECTION, SOLUTION EPIDURAL; INFILTRATION; INTRACAUDAL; PERINEURAL AS NEEDED
Status: DISCONTINUED | OUTPATIENT
Start: 2020-02-06 | End: 2020-02-06 | Stop reason: HOSPADM

## 2020-02-06 RX ORDER — SODIUM CHLORIDE 0.9 % (FLUSH) 0.9 %
5-40 SYRINGE (ML) INJECTION EVERY 8 HOURS
Status: DISCONTINUED | OUTPATIENT
Start: 2020-02-06 | End: 2020-02-06 | Stop reason: HOSPADM

## 2020-02-06 RX ORDER — SODIUM CHLORIDE, SODIUM LACTATE, POTASSIUM CHLORIDE, CALCIUM CHLORIDE 600; 310; 30; 20 MG/100ML; MG/100ML; MG/100ML; MG/100ML
75 INJECTION, SOLUTION INTRAVENOUS CONTINUOUS
Status: DISCONTINUED | OUTPATIENT
Start: 2020-02-06 | End: 2020-02-06 | Stop reason: HOSPADM

## 2020-02-06 RX ORDER — DEXAMETHASONE SODIUM PHOSPHATE 4 MG/ML
INJECTION, SOLUTION INTRA-ARTICULAR; INTRALESIONAL; INTRAMUSCULAR; INTRAVENOUS; SOFT TISSUE
Status: COMPLETED | OUTPATIENT
Start: 2020-02-06 | End: 2020-02-06

## 2020-02-06 RX ORDER — SODIUM CHLORIDE 9 MG/ML
75 INJECTION, SOLUTION INTRAVENOUS CONTINUOUS
Status: DISPENSED | OUTPATIENT
Start: 2020-02-06 | End: 2020-02-07

## 2020-02-06 RX ORDER — SODIUM CHLORIDE 0.9 % (FLUSH) 0.9 %
5-40 SYRINGE (ML) INJECTION EVERY 8 HOURS
Status: DISCONTINUED | OUTPATIENT
Start: 2020-02-06 | End: 2020-02-06

## 2020-02-06 RX ORDER — LANOLIN ALCOHOL/MO/W.PET/CERES
1 CREAM (GRAM) TOPICAL
Status: DISCONTINUED | OUTPATIENT
Start: 2020-02-07 | End: 2020-02-08 | Stop reason: HOSPADM

## 2020-02-06 RX ORDER — HYDROMORPHONE HYDROCHLORIDE 2 MG/ML
0.5 INJECTION, SOLUTION INTRAMUSCULAR; INTRAVENOUS; SUBCUTANEOUS
Status: DISCONTINUED | OUTPATIENT
Start: 2020-02-06 | End: 2020-02-06

## 2020-02-06 RX ORDER — BUPIVACAINE HYDROCHLORIDE AND EPINEPHRINE 5; 5 MG/ML; UG/ML
INJECTION, SOLUTION EPIDURAL; INTRACAUDAL; PERINEURAL
Status: COMPLETED | OUTPATIENT
Start: 2020-02-06 | End: 2020-02-06

## 2020-02-06 RX ORDER — MIDAZOLAM HYDROCHLORIDE 1 MG/ML
2 INJECTION, SOLUTION INTRAMUSCULAR; INTRAVENOUS
Status: COMPLETED | OUTPATIENT
Start: 2020-02-06 | End: 2020-02-06

## 2020-02-06 RX ORDER — EPHEDRINE SULFATE/0.9% NACL/PF 50 MG/5 ML
SYRINGE (ML) INTRAVENOUS AS NEEDED
Status: DISCONTINUED | OUTPATIENT
Start: 2020-02-06 | End: 2020-02-06 | Stop reason: HOSPADM

## 2020-02-06 RX ORDER — ACETAMINOPHEN 325 MG/1
325 TABLET ORAL
Status: DISCONTINUED | OUTPATIENT
Start: 2020-02-06 | End: 2020-02-08 | Stop reason: HOSPADM

## 2020-02-06 RX ORDER — FACIAL-BODY WIPES
10 EACH TOPICAL DAILY PRN
Status: DISCONTINUED | OUTPATIENT
Start: 2020-02-06 | End: 2020-02-08 | Stop reason: HOSPADM

## 2020-02-06 RX ORDER — HYDROMORPHONE HYDROCHLORIDE 1 MG/ML
1 INJECTION, SOLUTION INTRAMUSCULAR; INTRAVENOUS; SUBCUTANEOUS
Status: DISCONTINUED | OUTPATIENT
Start: 2020-02-06 | End: 2020-02-08 | Stop reason: HOSPADM

## 2020-02-06 RX ORDER — CALCIUM CARBONATE/VITAMIN D3 250-3.125
2 TABLET ORAL DAILY
Status: DISCONTINUED | OUTPATIENT
Start: 2020-02-07 | End: 2020-02-08 | Stop reason: HOSPADM

## 2020-02-06 RX ORDER — HYDROMORPHONE HYDROCHLORIDE 2 MG/1
2 TABLET ORAL
Status: DISCONTINUED | OUTPATIENT
Start: 2020-02-06 | End: 2020-02-08 | Stop reason: HOSPADM

## 2020-02-06 RX ORDER — SODIUM CHLORIDE 0.9 % (FLUSH) 0.9 %
5-40 SYRINGE (ML) INJECTION AS NEEDED
Status: DISCONTINUED | OUTPATIENT
Start: 2020-02-06 | End: 2020-02-06

## 2020-02-06 RX ORDER — ONDANSETRON 2 MG/ML
INJECTION INTRAMUSCULAR; INTRAVENOUS AS NEEDED
Status: DISCONTINUED | OUTPATIENT
Start: 2020-02-06 | End: 2020-02-06 | Stop reason: HOSPADM

## 2020-02-06 RX ORDER — FENTANYL CITRATE 50 UG/ML
100 INJECTION, SOLUTION INTRAMUSCULAR; INTRAVENOUS ONCE
Status: COMPLETED | OUTPATIENT
Start: 2020-02-06 | End: 2020-02-06

## 2020-02-06 RX ORDER — ROCURONIUM BROMIDE 10 MG/ML
INJECTION, SOLUTION INTRAVENOUS AS NEEDED
Status: DISCONTINUED | OUTPATIENT
Start: 2020-02-06 | End: 2020-02-06 | Stop reason: HOSPADM

## 2020-02-06 RX ORDER — TEMAZEPAM 15 MG/1
15 CAPSULE ORAL
Status: DISCONTINUED | OUTPATIENT
Start: 2020-02-06 | End: 2020-02-08 | Stop reason: HOSPADM

## 2020-02-06 RX ORDER — DOCUSATE SODIUM 100 MG/1
100 CAPSULE, LIQUID FILLED ORAL DAILY
Status: DISCONTINUED | OUTPATIENT
Start: 2020-02-07 | End: 2020-02-08 | Stop reason: HOSPADM

## 2020-02-06 RX ORDER — ONDANSETRON 2 MG/ML
4 INJECTION INTRAMUSCULAR; INTRAVENOUS
Status: DISCONTINUED | OUTPATIENT
Start: 2020-02-06 | End: 2020-02-08 | Stop reason: HOSPADM

## 2020-02-06 RX ORDER — NALOXONE HYDROCHLORIDE 0.4 MG/ML
0.2 INJECTION, SOLUTION INTRAMUSCULAR; INTRAVENOUS; SUBCUTANEOUS AS NEEDED
Status: DISCONTINUED | OUTPATIENT
Start: 2020-02-06 | End: 2020-02-06

## 2020-02-06 RX ORDER — MAGNESIUM CITRATE
296 SOLUTION, ORAL ORAL
Status: DISCONTINUED | OUTPATIENT
Start: 2020-02-06 | End: 2020-02-08 | Stop reason: HOSPADM

## 2020-02-06 RX ORDER — ACETAMINOPHEN 325 MG/1
650 TABLET ORAL
Status: DISCONTINUED | OUTPATIENT
Start: 2020-02-06 | End: 2020-02-08 | Stop reason: HOSPADM

## 2020-02-06 RX ORDER — SODIUM CHLORIDE 0.9 % (FLUSH) 0.9 %
5-40 SYRINGE (ML) INJECTION AS NEEDED
Status: DISCONTINUED | OUTPATIENT
Start: 2020-02-06 | End: 2020-02-06 | Stop reason: HOSPADM

## 2020-02-06 RX ADMIN — PHENYLEPHRINE HYDROCHLORIDE 100 MCG: 10 INJECTION INTRAVENOUS at 14:18

## 2020-02-06 RX ADMIN — HYDROMORPHONE HYDROCHLORIDE 2 MG: 2 TABLET ORAL at 20:46

## 2020-02-06 RX ADMIN — MIDAZOLAM 2 MG: 1 INJECTION INTRAMUSCULAR; INTRAVENOUS at 12:00

## 2020-02-06 RX ADMIN — Medication 4 MG: at 15:07

## 2020-02-06 RX ADMIN — PHENYLEPHRINE HYDROCHLORIDE 100 MCG: 10 INJECTION INTRAVENOUS at 13:33

## 2020-02-06 RX ADMIN — PHENYLEPHRINE HYDROCHLORIDE 100 MCG: 10 INJECTION INTRAVENOUS at 14:58

## 2020-02-06 RX ADMIN — SODIUM CHLORIDE, SODIUM LACTATE, POTASSIUM CHLORIDE, AND CALCIUM CHLORIDE: 600; 310; 30; 20 INJECTION, SOLUTION INTRAVENOUS at 13:54

## 2020-02-06 RX ADMIN — PHENYLEPHRINE HYDROCHLORIDE 100 MCG: 10 INJECTION INTRAVENOUS at 13:53

## 2020-02-06 RX ADMIN — LIDOCAINE HYDROCHLORIDE 30 MG: 20 INJECTION, SOLUTION EPIDURAL; INFILTRATION; INTRACAUDAL; PERINEURAL at 12:43

## 2020-02-06 RX ADMIN — ROCURONIUM BROMIDE 50 MG: 10 INJECTION, SOLUTION INTRAVENOUS at 12:43

## 2020-02-06 RX ADMIN — PHENYLEPHRINE HYDROCHLORIDE 100 MCG: 10 INJECTION INTRAVENOUS at 13:40

## 2020-02-06 RX ADMIN — Medication 10 MG: at 14:39

## 2020-02-06 RX ADMIN — PHENYLEPHRINE HYDROCHLORIDE 100 MCG: 10 INJECTION INTRAVENOUS at 14:37

## 2020-02-06 RX ADMIN — Medication 10 MG: at 13:19

## 2020-02-06 RX ADMIN — Medication 10 MG: at 12:52

## 2020-02-06 RX ADMIN — PHENYLEPHRINE HYDROCHLORIDE 100 MCG: 10 INJECTION INTRAVENOUS at 14:49

## 2020-02-06 RX ADMIN — FENTANYL CITRATE 50 MCG: 50 INJECTION, SOLUTION INTRAMUSCULAR; INTRAVENOUS at 12:01

## 2020-02-06 RX ADMIN — SODIUM CHLORIDE, SODIUM LACTATE, POTASSIUM CHLORIDE, AND CALCIUM CHLORIDE 75 ML/HR: 600; 310; 30; 20 INJECTION, SOLUTION INTRAVENOUS at 11:22

## 2020-02-06 RX ADMIN — DEXAMETHASONE SODIUM PHOSPHATE 4 MG: 4 INJECTION, SOLUTION INTRAMUSCULAR; INTRAVENOUS at 12:04

## 2020-02-06 RX ADMIN — PROPOFOL 140 MG: 10 INJECTION, EMULSION INTRAVENOUS at 12:43

## 2020-02-06 RX ADMIN — Medication 10 MG: at 13:11

## 2020-02-06 RX ADMIN — Medication 10 MG: at 13:04

## 2020-02-06 RX ADMIN — BUPIVACAINE HYDROCHLORIDE AND EPINEPHRINE BITARTRATE 30 ML: 5; .005 INJECTION, SOLUTION EPIDURAL; INTRACAUDAL; PERINEURAL at 12:04

## 2020-02-06 RX ADMIN — VANCOMYCIN HYDROCHLORIDE 1000 MG: 1 INJECTION, POWDER, LYOPHILIZED, FOR SOLUTION INTRAVENOUS at 13:25

## 2020-02-06 RX ADMIN — PHENYLEPHRINE HYDROCHLORIDE 100 MCG: 10 INJECTION INTRAVENOUS at 13:58

## 2020-02-06 RX ADMIN — DEXAMETHASONE SODIUM PHOSPHATE 4 MG: 4 INJECTION, SOLUTION INTRAMUSCULAR; INTRAVENOUS at 13:09

## 2020-02-06 RX ADMIN — ONDANSETRON 4 MG: 2 INJECTION INTRAMUSCULAR; INTRAVENOUS at 14:21

## 2020-02-06 RX ADMIN — PHENYLEPHRINE HYDROCHLORIDE 100 MCG: 10 INJECTION INTRAVENOUS at 14:28

## 2020-02-06 RX ADMIN — PHENYLEPHRINE HYDROCHLORIDE 150 MCG: 10 INJECTION INTRAVENOUS at 14:04

## 2020-02-06 RX ADMIN — GLYCOPYRROLATE 0.6 MG: 0.2 INJECTION, SOLUTION INTRAMUSCULAR; INTRAVENOUS at 15:07

## 2020-02-06 RX ADMIN — ROCURONIUM BROMIDE 10 MG: 10 INJECTION, SOLUTION INTRAVENOUS at 14:20

## 2020-02-06 NOTE — PROGRESS NOTES
Admission Assessment Complete. Pt is A&Ox 3.  +2 radial pulses with purposeful movement in all four extremities. Dressing is clean, dry and intact. IVF inusing. Kelly is draining straw yellow urine. Pt denies any pain or need at this time. Bed low and locked. Side rails x3. Call light with in reach. Pt verbalizes understanding of call light.

## 2020-02-06 NOTE — ANESTHESIA POSTPROCEDURE EVALUATION
Procedure(s):  INCISION, DEBRIDEMENT, REMOVAL IMPLANT LEFT SHOULDER \"REGINA REVERSE LEFT TSA\" WITH PROXIMAL HUMERAL OSTEOTOMY                                     2.  REVISION REVERSE LEFT TOTAL SHOULDER ARTHROPLASTY WITH LONG STEM DELTA EXTEND PROSTHESIS, BICEPS TENODESIS, LATISSIMUS DORSI AND TERES MAJOR TENDON TRANSFERS . general    Anesthesia Post Evaluation      Multimodal analgesia: multimodal analgesia used between 6 hours prior to anesthesia start to PACU discharge  Patient location during evaluation: bedside  Patient participation: complete - patient participated  Level of consciousness: awake and alert  Pain score: 1  Pain management: adequate  Airway patency: patent  Anesthetic complications: no  Cardiovascular status: acceptable  Respiratory status: acceptable  Hydration status: acceptable  Comments: Patient doing well. Continue care on floor.    Post anesthesia nausea and vomiting:  none      Vitals Value Taken Time   BP 92/54 2/6/2020  3:45 PM   Temp 36.7 °C (98.1 °F) 2/6/2020  3:22 PM   Pulse 87 2/6/2020  3:45 PM   Resp 14 2/6/2020  3:45 PM   SpO2 98 % 2/6/2020  3:45 PM

## 2020-02-06 NOTE — PROGRESS NOTES
02/06/20 1734   Oxygen Therapy   O2 Sat (%) 98 %   Pulse via Oximetry 81 beats per minute   O2 Device Nasal cannula   O2 Flow Rate (L/min) 2 l/min  (Weaned to RA)   Incentive Spirometry Treatment   Actual Volume (ml)   (Patient's  has IS.)

## 2020-02-06 NOTE — DISCHARGE SUMMARY
[facility] Discharge Summary      Patient ID:  Karolina Campbell  [id number]  63 y.o.  1960    Allergies: Codeine    Admit date: 2/6/2020    Discharge date and time: 2/8/2019    Admitting Physician: Roger Spangler MD     Discharge Physician: Roger Spangler MD      * Admission Diagnoses: Presence of left artificial shoulder joint [Z96.612]  Pain due to internal orthopedic prosthetic device, initial encounter [facility]) [T84.84XA]  Artificial joint pain ([facility] [address] [T84.84XA]  Arthropathy, transient, shoulder, left [M12.812]    * Discharge Diagnoses:   Hospital Problems as of 2/8/2020 Date Reviewed: 2/6/2020          Codes Class Noted - Resolved POA    Artificial joint pain ([facility] [address] ICD-10-CM: Z08.06JB  ICD-9-CM: 996.77  2/6/2020 - Present Unknown        Arthropathy, transient, shoulder, left ICD-10-CM: B79.986  ICD-9-CM: 716.41  2/6/2020 - Present Unknown        * (Principal) Pain due to left shoulder joint prosthesis ([facility] [address] ICD-10-CM: T84.84XA, J69.103  ICD-9-CM: 996.77, V43.61, 719.41  1/30/2020 - Present Yes        S/P reverse total shoulder arthroplasty, left ICD-10-CM: N75.838  ICD-9-CM: V43.61  1/30/2020 - Present Yes              Surgeon: Roger Spangler MD          * Procedure: Procedure(s):  INCISION, DEBRIDEMENT, REMOVAL IMPLANT LEFT SHOULDER \"REGINA REVERSE LEFT TSA\" WITH PROXIMAL HUMERAL OSTEOTOMY                                     2.  REVISION REVERSE LEFT TOTAL SHOULDER ARTHROPLASTY WITH LONG STEM DELTA EXTEND PROSTHESIS, BICEPS TENODESIS, LATISSIMUS DORSI AND TERES MAJOR TENDON TRANSFERS            Perioperative Antibiotics: Ancef  ___                                                Vancomycin  _x__          Post Op complications: none    Hemoglobin at discharge: ___    * Discharge Condition: good  Wound appears to be healing without any evidence of infection.          * Discharged to: Home    * Follow-up Care/Discharge instructions:  - Resume pre hospital diet            - Resume home medications per medical continuation form     CONTINUE PHYSICAL THERAPY  Sling left shoulder  - Follow up in office as scheduled       Signed:  Sawyer Peña MD  2/8/2020  10:58 AM

## 2020-02-06 NOTE — PERIOP NOTES
5 lead monitor in pacu showed some ST depression. I notified Dr. Da Freedman and he requested a 12 lead ekg. Done. Appears very minor. SR.  OK to transfer to floor.

## 2020-02-06 NOTE — ANESTHESIA PREPROCEDURE EVALUATION
Relevant Problems   No relevant active problems       Anesthetic History   No history of anesthetic complications            Review of Systems / Medical History  Patient summary reviewed, nursing notes reviewed and pertinent labs reviewed    Pulmonary  Within defined limits                 Neuro/Psych   Within defined limits           Cardiovascular  Within defined limits                Exercise tolerance: >4 METS     GI/Hepatic/Renal  Within defined limits              Endo/Other  Within defined limits           Other Findings              Physical Exam    Airway  Mallampati: I  TM Distance: > 6 cm  Neck ROM: normal range of motion   Mouth opening: Normal     Cardiovascular  Regular rate and rhythm,  S1 and S2 normal,  no murmur, click, rub, or gallop             Dental  No notable dental hx       Pulmonary  Breath sounds clear to auscultation               Abdominal         Other Findings            Anesthetic Plan    ASA: 1  Anesthesia type: general - interscalene block      Post-op pain plan if not by surgeon: peripheral nerve block single    Induction: Intravenous  Anesthetic plan and risks discussed with: Patient

## 2020-02-06 NOTE — PROGRESS NOTES
TRANSFER - IN REPORT:    Verbal report received from Sukumar Ochoa 84 (name) on Mack Picking  being received from [facility]) for routine progression of care      Report consisted of patients Situation, Background, Assessment and   Recommendations(SBAR). Information from the following report(s) SBAR, Kardex, OR Summary, Procedure Summary, Intake/Output, MAR, Accordion, Recent Results and Med Rec Status was reviewed with the receiving nurse. Opportunity for questions and clarification was provided. Assessment completed upon patients arrival to unit and care assumed.

## 2020-02-06 NOTE — ROUTINE PROCESS
TRANSFER - OUT REPORT: 
 
Verbal report given to Maricel Cao on Max Silverio  being transferred to Room 326 for routine progression of care Report consisted of patients Situation, Background, Assessment and  
Recommendations(SBAR). Information from the following report(s) SBAR, Procedure Summary, Intake/Output, MAR, Recent Results and Cardiac Rhythm SR, did 12-lead to look at st depression. was reviewed with the receiving nurse. Lines:  
Peripheral IV 02/06/20 Right Wrist (Active) Site Assessment Clean, dry, & intact 2/6/2020  4:08 PM  
Phlebitis Assessment 0 2/6/2020  4:08 PM  
Infiltration Assessment 0 2/6/2020  4:08 PM  
Dressing Status Clean, dry, & intact 2/6/2020  4:08 PM  
Dressing Type Tape;Transparent 2/6/2020  4:08 PM  
Hub Color/Line Status Green; Infusing 2/6/2020  4:08 PM  
  
 
Opportunity for questions and clarification was provided. Patient transported with: 
 O2 @ 2 liters

## 2020-02-07 ENCOUNTER — HOME HEALTH ADMISSION (OUTPATIENT)
Dept: HOME HEALTH SERVICES | Facility: HOME HEALTH | Age: 60
End: 2020-02-07
Payer: COMMERCIAL

## 2020-02-07 LAB
ANION GAP SERPL CALC-SCNC: 6 MMOL/L (ref 7–16)
BUN SERPL-MCNC: 15 MG/DL (ref 6–23)
CALCIUM SERPL-MCNC: 8.5 MG/DL (ref 8.3–10.4)
CHLORIDE SERPL-SCNC: 106 MMOL/L (ref 98–107)
CO2 SERPL-SCNC: 25 MMOL/L (ref 21–32)
CREAT SERPL-MCNC: 0.78 MG/DL (ref 0.6–1)
ERYTHROCYTE [DISTWIDTH] IN BLOOD BY AUTOMATED COUNT: 13.7 % (ref 11.9–14.6)
GLUCOSE SERPL-MCNC: 120 MG/DL (ref 65–100)
HCT VFR BLD AUTO: 27.5 % (ref 35.8–46.3)
HGB BLD-MCNC: 9.1 G/DL (ref 11.7–15.4)
MAGNESIUM SERPL-MCNC: 2 MG/DL (ref 1.8–2.4)
MCH RBC QN AUTO: 31.2 PG (ref 26.1–32.9)
MCHC RBC AUTO-ENTMCNC: 33.1 G/DL (ref 31.4–35)
MCV RBC AUTO: 94.2 FL (ref 79.6–97.8)
NRBC # BLD: 0 K/UL (ref 0–0.2)
PLATELET # BLD AUTO: 248 K/UL (ref 150–450)
PMV BLD AUTO: 9.9 FL (ref 9.4–12.3)
POTASSIUM SERPL-SCNC: 4.3 MMOL/L (ref 3.5–5.1)
RBC # BLD AUTO: 2.92 M/UL (ref 4.05–5.2)
SODIUM SERPL-SCNC: 137 MMOL/L (ref 136–145)
WBC # BLD AUTO: 8.5 K/UL (ref 4.3–11.1)

## 2020-02-07 PROCEDURE — 83735 ASSAY OF MAGNESIUM: CPT

## 2020-02-07 PROCEDURE — 80048 BASIC METABOLIC PNL TOTAL CA: CPT

## 2020-02-07 PROCEDURE — 97110 THERAPEUTIC EXERCISES: CPT

## 2020-02-07 PROCEDURE — 97161 PT EVAL LOW COMPLEX 20 MIN: CPT

## 2020-02-07 PROCEDURE — 74011250636 HC RX REV CODE- 250/636: Performed by: NURSE PRACTITIONER

## 2020-02-07 PROCEDURE — 65270000029 HC RM PRIVATE

## 2020-02-07 PROCEDURE — 74011250637 HC RX REV CODE- 250/637: Performed by: NURSE PRACTITIONER

## 2020-02-07 PROCEDURE — 36415 COLL VENOUS BLD VENIPUNCTURE: CPT

## 2020-02-07 PROCEDURE — 97530 THERAPEUTIC ACTIVITIES: CPT

## 2020-02-07 PROCEDURE — 85027 COMPLETE CBC AUTOMATED: CPT

## 2020-02-07 RX ORDER — DOXYCYCLINE 100 MG/1
100 CAPSULE ORAL 2 TIMES DAILY
Qty: 60 CAP | Refills: 0 | Status: SHIPPED | OUTPATIENT
Start: 2020-02-07 | End: 2020-03-08

## 2020-02-07 RX ADMIN — VANCOMYCIN HYDROCHLORIDE 1000 MG: 1 INJECTION, POWDER, LYOPHILIZED, FOR SOLUTION INTRAVENOUS at 14:19

## 2020-02-07 RX ADMIN — DOCUSATE SODIUM 100 MG: 100 CAPSULE, LIQUID FILLED ORAL at 08:17

## 2020-02-07 RX ADMIN — VANCOMYCIN HYDROCHLORIDE 1000 MG: 1 INJECTION, POWDER, LYOPHILIZED, FOR SOLUTION INTRAVENOUS at 02:00

## 2020-02-07 RX ADMIN — ACETAMINOPHEN 650 MG: 325 TABLET, FILM COATED ORAL at 17:41

## 2020-02-07 RX ADMIN — FERROUS SULFATE TAB 325 MG (65 MG ELEMENTAL FE) 325 MG: 325 (65 FE) TAB at 08:17

## 2020-02-07 RX ADMIN — TEMAZEPAM 15 MG: 15 CAPSULE ORAL at 01:00

## 2020-02-07 RX ADMIN — HYDROMORPHONE HYDROCHLORIDE 2 MG: 2 TABLET ORAL at 22:58

## 2020-02-07 RX ADMIN — HYDROMORPHONE HYDROCHLORIDE 2 MG: 2 TABLET ORAL at 08:17

## 2020-02-07 RX ADMIN — CALCIUM CARBONATE-CHOLECALCIFEROL TAB 250 MG-125 UNIT 2 TABLET: 250-125 TAB at 08:17

## 2020-02-07 RX ADMIN — MULTIPLE VITAMINS W/ MINERALS TAB 1 TABLET: TAB at 08:17

## 2020-02-07 RX ADMIN — HYDROMORPHONE HYDROCHLORIDE 2 MG: 2 TABLET ORAL at 00:59

## 2020-02-07 NOTE — PROGRESS NOTES
Orthopedic Joint Progress Note    2020  Admit Date: 2020  Admit Diagnosis: Presence of left artificial shoulder joint [Z96.612]  Pain due to internal orthopedic prosthetic device, initial encounter [facility]) [T84.84XA]  Artificial joint pain ([address] [T84.84XA]  Arthropathy, transient, shoulder, left [M12.812]    1 Day Post-Op    Subjective:     [facility] PATIENT LYING IN BED; FAMILY AT BEDSIDE    Review of Systems: Pertinent items are noted in HPI. Objective:     PT/OT:     PATIENT MOBILITY                           Vital Signs:    Blood pressure 124/68, pulse 92, temperature 98.2 °F (36.8 °C), resp. rate 17, height 5' 6\" (1.676 m), weight 70.9 kg (156 lb 3.2 oz), SpO2 97 %.   Temp (24hrs), Av.2 °F (36.8 °C), Min:98 °F (36.7 °C), Max:98.3 °F (36.8 °C)      Pain Control:   Pain Assessment  Pain Scale 1: Numeric (0 - 10)  Pain Intensity 1: 2  Pain Onset 1: post op  Pain Location 1: Shoulder  Pain Orientation 1: Left  Pain Description 1: Aching  Pain Intervention(s) 1: Medication (see MAR)    Meds:  Current Facility-Administered Medications   Medication Dose Route Frequency    acetaminophen (TYLENOL) tablet 325 mg  325 mg Oral Q4H PRN    calcium-vitamin D (OSCAL 250) tablet 2 Tab  2 Tab Oral DAILY    multivitamin, tx-iron-ca-min (THERA-M w/ IRON) tablet 1 Tab  1 Tab Oral DAILY    0.9% sodium chloride infusion  75 mL/hr IntraVENous CONTINUOUS    sodium chloride (NS) flush 5-40 mL  5-40 mL IntraVENous PRN    acetaminophen (TYLENOL) tablet 650 mg  650 mg Oral Q4H PRN    HYDROmorphone (PF) (DILAUDID) injection 1 mg  1 mg IntraVENous Q1H PRN    ondansetron (ZOFRAN) injection 4 mg  4 mg IntraVENous Q4H PRN    vancomycin (VANCOCIN) 1,000 mg in 0.9% sodium chloride (MBP/ADV) 250 mL  1,000 mg IntraVENous Q12H    bisacodyL (DULCOLAX) suppository 10 mg  10 mg Rectal DAILY PRN    docusate sodium (COLACE) capsule 100 mg  100 mg Oral DAILY    ferrous sulfate tablet 325 mg  1 Tab Oral DAILY WITH BREAKFAST    HYDROmorphone (DILAUDID) tablet 2 mg  2 mg Oral Q3H PRN    magnesium citrate solution 296 mL  296 mL Oral DAILY PRN    sodium phosphate (FLEET'S) enema 1 Enema  1 Enema Rectal DAILY PRN    temazepam (RESTORIL) capsule 15 mg  15 mg Oral QHS PRN    tuberculin injection 5 Units  5 Units IntraDERMal ONCE        LAB:    Lab Results   Component Value Date/Time    INR 0.9 01/30/2020 01:13 PM     Lab Results   Component Value Date/Time    HGB 9.1 (L) 02/07/2020 04:14 AM    HGB 11.8 01/30/2020 01:13 PM    HGB 11.8 12/09/2019 02:00 PM       Wound Shoulder Left (Active)   Dressing Status Clean, dry, and intact 2/6/2020  7:37 PM   Dressing Type Dry dressing;Elastic bandage 2/6/2020  7:37 PM   Splint Type/Material Sling 2/6/2020  5:55 PM   Drainage Amount None 2/6/2020  5:55 PM   Number of days: 1         Physical Exam:  General: alert, cooperative, no distress, appears stated age  Cardiovascular negative  Lungs: negative  Musculoskeletal: ABLE TO MOVE RIGHT ELBOW, WRIST, AND HAND  Neurological: ALL NERVES WORKING PROPERLY INCLUDING MEDIAN, RADIAL, ULNAR  Skin:INCISION AND DRESSING TO LEFT SHOULDER CLEAN, DRY, INTACT    Assessment:      Principal Problem:    Pain due to left shoulder joint prosthesis ([address] (1/30/2020)    Active Problems:    S/P reverse total shoulder arthroplasty, left (1/30/2020)      Artificial joint pain ([address] (2/6/2020)      Arthropathy, transient, shoulder, left (2/6/2020)         Plan:     Continue PT/OT/Rehab  Consult: Rehab team including PT, OT, recreational therapy, and     Patient Expects to be Discharged to[de-identified] Private residence     [address] [place] [address],[address] [id number] THIS AM - XEROFORM, GAUZE, TEGADERM  CONTINUE VANCO X 6 DOSES  WILL [address]

## 2020-02-07 NOTE — PROGRESS NOTES
Problem: Mobility Impaired (Adult and Pediatric)  Goal: *Acute Goals and Plan of Care (Insert Text)  Description  GOALS (1-4 days):  (1.)  Patient will move from supine to sit and sit to supine  in bed with SUPERVISION. (2.)  Patient will transfer from bed to chair and chair to bed with SUPERVISION using the least restrictive device. (3.)  Patient will ambulate with SUPERVISION for 200 feet with the least restrictive device. (4.)  Patient will be independent with shoulder HEP to increase range of motion per MD orders.   ________________________________________________________________________________________________   Outcome: Progressing Towards Goal     PHYSICAL THERAPY: Daily Note and PM 2/7/2020  INPATIENT: Hospital Day: 2  Payor: [employer] / Plan: YOLI Sheth. / Product Type: Commerical /      NAME/AGE/GENDER: Alton Rocha is a 63 y.o. female   PRIMARY DIAGNOSIS: Presence of left artificial shoulder joint [Z96.612]  Pain due to internal orthopedic prosthetic device, initial encounter ([facility] [T84.84XA]  Artificial joint pain ([facility]) [T84.84XA]  Arthropathy, transient, shoulder, left [M12.812] Pain due to left shoulder joint prosthesis ([facility] Pain due to left shoulder joint prosthesis ([facility])  Procedure(s) (LRB):  INCISION, DEBRIDEMENT, REMOVAL IMPLANT LEFT SHOULDER \"REGINA REVERSE LEFT TSA\" WITH PROXIMAL HUMERAL OSTEOTOMY                                     2.  REVISION REVERSE LEFT TOTAL SHOULDER ARTHROPLASTY WITH LONG STEM DELTA EXTEND PROSTHESIS, BICEPS TENODESIS, LATISSIMUS DORSI AND TERES MAJOR TENDON TRANSFERS  (Left)  1 Day Post-Op  ICD-10: Treatment Diagnosis:   · Pain in Left Shoulder (M25.512)  · Stiffness of Left Shoulder, Not elsewhere classified (M25.612)  · Difficulty in walking, Not elsewhere classified (R26.2)   Precaution/Allergies:  Codeine      ASSESSMENT:     Ms. Ben Orozco is sitting up in chair on arrival. She is still sleepy but feeling better this afternoon. Exercises per chart below while sitting up in chair. Sit to stand multiple times to change gown and pt to wash of. Pt left sitting up in chair with needs in reach and family member present. This section established at most recent assessment   PROBLEM LIST (Impairments causing functional limitations):  1. Decreased Chaplin with Bed Mobility  2. Decreased Chaplin with Transfers  3. Decreased Chaplin with Ambulation   4. Decreased Chaplin with shoulder HEP   INTERVENTIONS PLANNED: (Benefits and precautions of physical therapy have been discussed with the patient.)  1. Bed Mobility Training  2. Transfer Training  3. Gait Training  4. Therapeutic Exercises per MD orders  5. Modalities for Pain     TREATMENT PLAN: Frequency/Duration: twice daily for duration of hospital stay  Rehabilitation Potential For Stated Goals: Excellent     RECOMMENDED REHABILITATION/EQUIPMENT: (at time of discharge pending progress): Continue Skilled Therapy and Home Health: Physical Therapy. HISTORY:   History of Present Injury/Illness (Reason for Referral): Admitted for L shoulder revision  Past Medical History/Comorbidities:   Ms. Yoon Robbins  has a past medical history of Allergic rhinitis (3/7/2013), Chronic sinusitis, Deviated nasal septum, Eustachian tube dysfunction, Fatigue (3/7/2013), High frequency hearing loss, Hypertrophy of nasal turbinates, Migraine headache, Otalgia, SNHL (sensorineural hearing loss), Tinnitus, and TMJ (temporomandibular joint disorder). Ms. Yoon Robbins  has a past surgical history that includes hx gyn (2009) and hx orthopaedic (Left, 2018).   Social History/Living Environment:   Home Environment: Private residence  One/Two Story Residence: Two story  Living Alone: No  Support Systems: Spouse/Significant Other/Partner  Patient Expects to be Discharged to[de-identified] Private residence  Current DME Used/Available at Home: None  Prior Level of Function/Work/Activity:  Independent Number of Personal Factors/Comorbidities that affect the Plan of Care: 0: LOW COMPLEXITY   EXAMINATION:   Most Recent Physical Functioning:   Gross Assessment:                  Posture:     Balance:    Bed Mobility:     Wheelchair Mobility:     Transfers:  Sit to Stand: Contact guard assistance  Stand to Sit: Contact guard assistance  Bed to Chair: Contact guard assistance  Gait:     Ambulation - Level of Assistance: Contact guard assistance      Body Structures Involved:  1. Joints  2. Muscles Body Functions Affected:  1. Movement Related Activities and Participation Affected:  1. Mobility   Number of elements that affect the Plan of Care: 4+: HIGH COMPLEXITY   CLINICAL PRESENTATION:   Presentation: Stable and uncomplicated: LOW COMPLEXITY   CLINICAL DECISION MAKIN[address] [place] [address] AM-PAC 6 Clicks   Basic Mobility Inpatient Short Form  How much difficulty does the patient currently have. .. Unable A Lot A Little None   1. Turning over in bed (including adjusting bedclothes, sheets and blankets)? [] 1   [] 2   [x] 3   [] 4   2. Sitting down on and standing up from a chair with arms ( e.g., wheelchair, bedside commode, etc.)   [] 1   [] 2   [x] 3   [] 4   3. Moving from lying on back to sitting on the side of the bed? [] 1   [] 2   [x] 3   [] 4   How much help from another person does the patient currently need. .. Total A Lot A Little None   4. Moving to and from a bed to a chair (including a wheelchair)? [] 1   [] 2   [x] 3   [] 4   5. Need to walk in hospital room? [] 1   [] 2   [x] 3   [] 4   6. Climbing 3-5 steps with a railing? [] 1   [] 2   [x] 3   [] 4   © , Trustees of [address] [place] [address], under license to [facility]. All rights reserved      Score:  Initial: 18 Most Recent: X (Date: -- )    Interpretation of Tool:  Represents activities that are increasingly more difficult (i.e. Bed mobility, Transfers, Gait).     Medical Necessity:     · Patient is expected to demonstrate progress in   · strength and range of motion  ·  to   · increase independence with HEP and ROM   · .  Reason for Services/Other Comments:  · Patient continues to require skilled intervention due to   · Limited functional independence   · . Use of outcome tool(s) and clinical judgement create a POC that gives a: Clear prediction of patient's progress: LOW COMPLEXITY            TREATMENT:   (In addition to Assessment/Re-Assessment sessions the following treatments were rendered)   Pre-treatment Symptoms/Complaints:  Left shoulder pain and dizziness from pain meds   Pain: Initial:  Not rated     Post Session:  0     Therapeutic Activity: (    10 minutes): Therapeutic activities including Chair transfers to improve mobility, strength and balance. Required minimal   to promote dynamic balance in standing. Therapeutic Exercise: (15 Minutes):  Exercises per grid below to improve mobility and strength. Required minimal verbal cues to promote proper body alignment. Date:  2/7 Date:   Date:     ACTIVITY/EXERCISE AM PM AM PM AM PM   Gripping 10 15       Wrist Flexion/Extension 10 15       Wrist Ulnar/Radial Deviation         Pronation/Supination 10 15       Elbow Flexion/Extension 10 15       Shoulder Flexion/Extension         Shoulder AB/ADduction         Shoulder IR/ER         Pulleys         Pendulums  15 sitting       Shrugs         Isometric:                 Flexion         Extension         ABduction         ADduction         Biceps/Triceps                  B = bilateral; AA = active assistive; A = active; P = passive  Education:  [x]  Home Exercises  [x]  Sling Application   []  Movement Precautions   []  Pulleys   [x]  Use of Ice   []  Other:   Treatment/Session Assessment:    · Response to Treatment:  Pt able to tolerate more this afternoon, but still feeling dizzy with standing. Decreased BP.    · Interdisciplinary Collaboration:   o Physical Therapy Assistant  o Registered Nurse  o Certified Nursing Assistant/Patient Care Technician  · After treatment position/precautions:   o Up in chair  o Bed/Chair-wheels locked  o Bed in low position  o Call light within reach  o Family at bedside   · Compliance with Program/Exercises: compliant all of the time. · Recommendations/Intent for next treatment session:  Treatment next visit will focus on increasing Ms. Francis's independence with bed mobility, transfers, gait training, strength/ROM exercises, modalities for pain, and patient education.    Total Treatment Duration:  PT Patient Time In/Time Out  Time In: 1335  Time Out: [facility]

## 2020-02-07 NOTE — PROGRESS NOTES
Post op interview conducted following INCISION, DEBRIDEMENT, REMOVAL IMPLANT LEFT SHOULDER \"REGINA REVERSE LEFT TSA\" WITH PROXIMAL HUMERAL OSTEOTOMY                                     2.  REVISION REVERSE LEFT TOTAL SHOULDER ARTHROPLASTY WITH LONG STEM DELTA EXTEND PROSTHESIS, BICEPS TENODESIS, LATISSIMUS DORSI AND TERES MAJOR TENDON TRANSFERS :  dated 2/6/20, no anesthetic complications noted

## 2020-02-07 NOTE — PROGRESS NOTES
[facility] - INPATIENT  Face to Face Encounter    Patients Name: Evelin Santos    YOB: 1960    Ordering Physician: Patrick Luke    Primary Diagnosis: Presence of left artificial shoulder joint [Z96.612]  Pain due to internal orthopedic prosthetic device, initial encounter [facility]) [T84.84XA]  Artificial joint pain ([address] [T84.84XA]  Arthropathy, transient, shoulder, left [M12.812]  S/p left Reverse TSA    Date of Face to Face:   2/6/20                                 Face to Face Encounter findings are related to primary reason for home care:   yes. 1. I certify that the patient needs intermittent care as follows: physical therapy: strengthening and stretching/ROM  occupational therapy:  ADL safety (ie. cooking, bathing, dressing) and ROM    2. I certify that this patient is homebound, that is:) patient has a normal inability to leave the home and leaving the home requires considerable and taxing effort. Patient may leave the home for infrequent and short duration for medical reasons, and occasional absences for non-medical reasons. Homebound status is due to the following functional limitations: Patient with strength deficits limiting the performance of all ADL's without caregiver assistance or the use of an assistive device. 3. I certify that this patient is under my care and that I, or a nurse practitioner or  [id number], or clinical nurse specialist, or certified nurse midwife, working with me, had a Face-to-Face Encounter that meets the physician Face-to-Face Encounter requirements.   The following are the clinical findings from the [address] [place] [address] encounter that support the need for skilled services and is a summary of the encounter: see hospital chart          Rafia Fabian, BS  2/7/2020      THE FOLLOWING TO BE COMPLETED BY THE COMMUNITY PHYSICIAN:    I concur with the findings described above from the [facility] encounter that this patient is homebound and in need of a skilled service.     Certifying Physician: _____________________________________      Printed Certifying Physician Name: _____________________________________    Date: _________________

## 2020-02-07 NOTE — PROGRESS NOTES
Slept at intervals during shift,no change in status noted. Family member at bedside. Call light within reach.

## 2020-02-07 NOTE — PROGRESS NOTES
Care Management Interventions  PCP Verified by CM: Yes  Mode of Transport at Discharge: Self  Transition of Care Consult (CM Consult): [address]: Yes  Physical Therapy Consult: Yes  Current Support Network: Lives with Spouse  Discharge Location  Discharge Placement: Home with home health    Patient is a 63 yr old female admitted for a left reverse TSA. She lives with her spouse and plans to return home on d/c. Order rec'd to arrange home health. Pt w/o preference towards provider. Referral sent to [facility].  Margarita De La Cruz

## 2020-02-07 NOTE — PROGRESS NOTES
Problem: Mobility Impaired (Adult and Pediatric)  Goal: *Acute Goals and Plan of Care (Insert Text)  Description  GOALS (1-4 days):  (1.)  Patient will move from supine to sit and sit to supine  in bed with SUPERVISION. (2.)  Patient will transfer from bed to chair and chair to bed with SUPERVISION using the least restrictive device. (3.)  Patient will ambulate with SUPERVISION for 200 feet with the least restrictive device. (4.)  Patient will be independent with shoulder HEP to increase range of motion per MD orders. ________________________________________________________________________________________________   Outcome: Progressing Towards Goal     PHYSICAL THERAPY: Initial Assessment and AM 2/7/2020  INPATIENT: Hospital Day: 2  Payor: [employer] / Plan: YOLI Leo / Product Type: Commerical /      NAME/AGE/GENDER: Aryan Bachelor is a 63 y.o. female   PRIMARY DIAGNOSIS: Presence of left artificial shoulder joint [Z96.612]  Pain due to internal orthopedic prosthetic device, initial encounter ([address] [T84.84XA]  Artificial joint pain (HCC) [T84.84XA]  Arthropathy, transient, shoulder, left [M12.812] Pain due to left shoulder joint prosthesis (HCC) Pain due to left shoulder joint prosthesis (HCC)  Procedure(s) (LRB):  INCISION, DEBRIDEMENT, REMOVAL IMPLANT LEFT SHOULDER \"REGINA REVERSE LEFT TSA\" WITH PROXIMAL HUMERAL OSTEOTOMY                                     2.  REVISION REVERSE LEFT TOTAL SHOULDER ARTHROPLASTY WITH LONG STEM DELTA EXTEND PROSTHESIS, BICEPS TENODESIS, LATISSIMUS DORSI AND TERES MAJOR TENDON TRANSFERS  (Left)  1 Day Post-Op  ICD-10: Treatment Diagnosis:   Pain in Left Shoulder (M25.512)  Stiffness of Left Shoulder, Not elsewhere classified (M25.612)  Difficulty in walking, Not elsewhere classified (R26.2)   Precaution/Allergies:  Codeine      ASSESSMENT:     Ms. Reyes Urrutia presents with limited ROM and strength following her L shoulder revision.  She will benefit from PT to increase her shoulder ROM and independence with therex. She participated as best she could but her BP has been running low and she sat EOB to perform therex. She could not tolerate standing more than a few minutes because she got nauseated and faint. Returned supine. We will progress mobility as tolerated. This section established at most recent assessment   PROBLEM LIST (Impairments causing functional limitations):  Decreased Yell with Bed Mobility  Decreased Yell with Transfers  Decreased Yell with Ambulation   Decreased Yell with shoulder HEP   INTERVENTIONS PLANNED: (Benefits and precautions of physical therapy have been discussed with the patient.)  Bed Mobility Training  Transfer Training  Gait Training  Therapeutic Exercises per MD orders  Modalities for Pain     TREATMENT PLAN: Frequency/Duration: twice daily for duration of hospital stay  Rehabilitation Potential For Stated Goals: Excellent     RECOMMENDED REHABILITATION/EQUIPMENT: (at time of discharge pending progress): Continue Skilled Therapy and Home Health: Physical Therapy. HISTORY:   History of Present Injury/Illness (Reason for Referral): Admitted for L shoulder revision  Past Medical History/Comorbidities:   Ms. Del Mccartney  has a past medical history of Allergic rhinitis (3/7/2013), Chronic sinusitis, Deviated nasal septum, Eustachian tube dysfunction, Fatigue (3/7/2013), High frequency hearing loss, Hypertrophy of nasal turbinates, Migraine headache, Otalgia, SNHL (sensorineural hearing loss), Tinnitus, and TMJ (temporomandibular joint disorder). Ms. Del Mccartney  has a past surgical history that includes hx gyn (2009) and hx orthopaedic (Left, 2018).   Social History/Living Environment:   Home Environment: Private residence  One/Two Story Residence: Two story  Living Alone: No  Support Systems: Spouse/Significant Other/Partner  Patient Expects to be Discharged to[de-identified] Private residence  Current DME Used/Available at Home: None  Prior Level of Function/Work/Activity:  Independent    Number of Personal Factors/Comorbidities that affect the Plan of Care: 0: LOW COMPLEXITY   EXAMINATION:   Most Recent Physical Functioning:   Gross Assessment:  AROM: Within functional limits(limited L UE)  Strength: Within functional limits(limited L UE)               Posture:     Balance:  Sitting: Intact  Standing: Pull to stand; With support Bed Mobility:  Supine to Sit: Contact guard assistance  Sit to Supine: Minimum assistance  Scooting: Stand-by assistance  Wheelchair Mobility:     Transfers:  Sit to Stand: Minimum assistance;Assist x1  Stand to Sit: Contact guard assistance  Bed to Chair: Contact guard assistance  Gait:     Ambulation - Level of Assistance: Contact guard assistance      Body Structures Involved:  Joints  Muscles Body Functions Affected: Movement Related Activities and Participation Affected: Mobility   Number of elements that affect the Plan of Care: 4+: HIGH COMPLEXITY   CLINICAL PRESENTATION:   Presentation: Stable and uncomplicated: LOW COMPLEXITY   CLINICAL DECISION MAKIN [facility] [address] AM-PAC 6 Clicks   Basic Mobility Inpatient Short Form  How much difficulty does the patient currently have. .. Unable A Lot A Little None   1. Turning over in bed (including adjusting bedclothes, sheets and blankets)? [] 1   [] 2   [x] 3   [] 4   2. Sitting down on and standing up from a chair with arms ( e.g., wheelchair, bedside commode, etc.)   [] 1   [] 2   [x] 3   [] 4   3. Moving from lying on back to sitting on the side of the bed? [] 1   [] 2   [x] 3   [] 4   How much help from another person does the patient currently need. .. Total A Lot A Little None   4. Moving to and from a bed to a chair (including a wheelchair)? [] 1   [] 2   [x] 3   [] 4   5. Need to walk in hospital room? [] 1   [] 2   [x] 3   [] 4   6. Climbing 3-5 steps with a railing?    [] 1   [] 2   [x] 3 [] 4   © 2007, Trustees of [address] [place] [address] [address] [address], under license to [employer]. All rights reserved      Score:  Initial: 18 Most Recent: X (Date: -- )    Interpretation of Tool:  Represents activities that are increasingly more difficult (i.e. Bed mobility, Transfers, Gait). Medical Necessity:     Patient is expected to demonstrate progress in   strength and range of motion   to   increase independence with HEP and ROM   . Reason for Services/Other Comments:  Patient continues to require skilled intervention due to   Limited functional independence   . Use of outcome tool(s) and clinical judgement create a POC that gives a: Clear prediction of patient's progress: LOW COMPLEXITY            TREATMENT:   (In addition to Assessment/Re-Assessment sessions the following treatments were rendered)   Pre-treatment Symptoms/Complaints:  none   Pain: Initial:   Pain Intensity 1: (no complaints during therapy)  Post Session:  0     Therapeutic Exercise: (10 Minutes):  Exercises per grid below to improve mobility and strength. Required minimal verbal cues to promote proper body alignment.      Date:  2/7 Date:   Date:     ACTIVITY/EXERCISE AM PM AM PM AM PM   Gripping 10        Wrist Flexion/Extension 10        Wrist Ulnar/Radial Deviation         Pronation/Supination 10        Elbow Flexion/Extension 10        Shoulder Flexion/Extension         Shoulder AB/ADduction         Shoulder IR/ER         Pulleys         Pendulums         Shrugs         Isometric:                 Flexion         Extension         ABduction         ADduction         Biceps/Triceps                  B = bilateral; AA = active assistive; A = active; P = passive  Education:  [x]  Home Exercises  [x]  Sling Application   []  Movement Precautions   []  Pulleys   [x]  Use of Ice   []  Other:   Treatment/Session Assessment:    Response to Treatment:  tolerated therapy fairly well but she got faint and had to return supine  Interdisciplinary Collaboration: Physical Therapist  Certified Nursing Assistant/Patient Care Technician  After treatment position/precautions:   Supine in bed  Bed/Chair-wheels locked  Bed in low position  Call light within reach  Family at bedside   Compliance with Program/Exercises: compliant all of the time. Recommendations/Intent for next treatment session:  Treatment next visit will focus on increasing Ms. Francis's independence with bed mobility, transfers, gait training, strength/ROM exercises, modalities for pain, and patient education.    Total Treatment Duration:  PT Patient Time In/Time Out  Time In: 1035  Time Out: [address]  [address], PT

## 2020-02-07 NOTE — PROGRESS NOTES
Shift assessment complete. Patient is alert and oriented x4. Kelly removed. Has not voided yet. Dressing change to shoulder with xeroform, gauze and tegaderm dry and intact. Sling intact. Palpable pulses. Incentive spirometer at bedside. Bed is low and locked. Call light within reach. Instructed to call for assistance. Family at bedside.

## 2020-02-07 NOTE — PROGRESS NOTES
Resting comfortably,dsng D/I. Still numb,unable to move fingers on command. Skin warm and pink,capillary refill rapid. Sling in use L arm and shoulder. Pillow under shoulder and upper arm. SCDs on bilaterally. Call light within reach. Denies needs at present.

## 2020-02-07 NOTE — OP NOTES
[place]  OPERATIVE REPORT    Name:  Sally Salazar  MR#:  [id number]  :  1960  ACCOUNT #:  [de-identified]  DATE OF SERVICE:  2020    PREOPERATIVE DIAGNOSIS:  Painful reverse left total shoulder arthroplasty. POSTOPERATIVE DIAGNOSIS:  Painful reverse left total shoulder arthroplasty. PROCEDURE PERFORMED:  Incision, debridement, removal of implant, left shoulder \"Dahiana\" reverse left total shoulder arthroplasty with a proximal humeral osteotomy and revision reverse left total shoulder arthroplasty with a long stem Delta Xtend prosthesis, biceps tenodesis, latissimus dorsi and teres major tendon transfer. SURGEON:  Huy Ricardo. Asiya Cuadra MD    ASSISTANT:  First assistant Prema Mcguire NP. Use of a first assistant was necessary to optimize the patient's safety. Use of first assistant was necessary to dissection of vital anatomical structures, remove the previous implant, place the new implant. Modifier AS was applied. ANESTHESIA:  General with an interscalene block. COMPLICATIONS:  None. SPECIMENS REMOVED:  Three cultures of the left shoulder were sent labeled, 1, 2, 3 for the left shoulder. IMPLANTS:  Hardware utilized was DePuy +10 metaglene, 42 eccentric +4 mm lateralized glenoid, 42 +9 cup, 3+ 9 extenders, 10.2 long stem, 8 mm Biostop G, 40 mm superior and inferior nonlocking cortical screws. ESTIMATED BLOOD LOSS:  300 mL. PATHOLOGY:  Painful reverse total shoulder arthroplasty. CPT CODES:  95832, [id number], 44624 with a modifier AS for assistant surgeon. ICD-10 CODES:  T84.84x, [id number]. INDICATIONS:  The patient is a 50-year-old female with a complex history regarding her left shoulder. The patient initially fractured her left shoulder secondary to a fall. She was initially treated elsewhere. She initially underwent a reverse left total shoulder arthroplasty of her left proximal humerus fracture.   She has developed a painful reverse left total shoulder arthroplasty with limited function. Preoperative physical exam, radiographic studies and labs demonstrated normal white count of 6. ESR is normal at 22. CRP is normal at 0.3. There is significant resorption on the proximal x-rays particularly with the greater tuberosity. The patient has exhausted extensive nonoperative modalities and electively admitted for operative intervention. PROCEDURE:  Following identification of the patient, the patient was taken to the operative suite. Following administration of general anesthesia, interscalene block for postop pain control, no antibiotics and placement of Kelly catheter. The patient very carefully positioned on the operative table in the beach-chair fashion. Left shoulder was then prepped and draped in a sterile fashion. Her previous surgical incision was identified and it was marked on the skin and extended proximally and distally. InteguSeal was applied. Once the InteguSeal was allowed to cure, the previous surgical incision was elliptically incised in its entirety and extended proximally and distally. Subcutaneous tissue was then dissected down to the cephalic vein. This was dissected proximally and distally and retracted laterally with the deltoid, pec major and strap muscles were retracted medially. The deltoid was then meticulously elevated off the underlying humeral shaft. Axillary nerve was protected throughout the procedure. The biceps tendon was identified, it was dissected free and tagged for later tenodesis. Subscapularis was elevated sharply off the lesser tuberosity and tagged. The glenohumeral joint was then entered, cultures labeled 1, 2, and 3 from the left shoulder were obtained. The patient then received 1 g of IV vancomycin. Frozen section was sent as well and this was negative. The humeral shaft was then identified and the reverse shoulder arthroplasty was then dislocated very carefully.   The epiphysis of the Dahiana component was then removed. It was noted that the greater tuberosity had resorbed and it was adherent in a malunited position. This was then completely mobilized, debrided and tagged for later reconstruction. Attention was then turned to the glenosphere and metaglene. The glenosphere was disengaged and removed in its entirety. The central screw of the metaglene was removed. The four screws were removed as well and the base plate at the metaglene was removed without incident. The glenoid was then meticulously exposed. The glenoid guidewire was then drilled, glenoid was then drilled and reamed to a +10 depth. The frozen section came back negative. At this point, the operation continued. A 2+ 10 metaglene was inserted and secured with a 40 mm superior and inferior nonlocking cortical screw. This yielded excellent fixation of our metaglene. A 42 eccentric +4 mm lateralized glenosphere was then inserted in the purposed orientation and secured in the standard fashion. Metaglene and glenosphere were stable. The humerus was then dislocated back from the wound and the stem was then identified with use of an osteotome and a bur. A proximal humeral osteotomy was then performed. At this point, with the use of the extraction device, the humeral stem was removed in its entirety. This was then sequentially reamed to remove all remaining cement. This was done without incident and no evident fracture. Once all the cement was removed, the humeral canal was vigorously irrigated and debrided and reamed to accommodate a 10.2 long stem. It was noted that a 10.2 long stem with +3 9 extenders and a 42 +9 cup gave excellent stability and mobility. At this point, the latissimus dorsi and teres major tendons were then identified. They were released off the humeral shaft, tagged and mobilized posterior for later transfer. The axillary and radial nerves were protected. The humeral canal was then irrigated and dried.   An 8 mm Rebecca Ruse was then placed distally. Antibiotic-impregnated cement was then mixed and inserted in the humeral canal and a 10.2 long stem was cemented in the appropriate version. Excessive cement was removed with a curette. Once the cement was allowed to cure, 3+9 extenders and a 42+9 cup were inserted in standard fashion. The shoulder was then reduced. There was excellent stability with excellent mobility. At this point, the latissimus dorsi and teres major tendons were then  transferred posteriorly and secured with #5 and #2 Mersilene sutures. Biceps was tenodesed using #5 and #2 Mersilene sutures. The arm was put through range of motion. The axillary and radial nerves were intact. The deltopectoral interval was approximated using #5 Mersilene sutures. The wound was then irrigated and closed with 0 Vicryl figure-of-eight sutures, and a 0 Prolene subcuticular stitch. A sterile dressing was applied. Sling and swathe was applied. The patient was then transferred to the recovery room in stable condition. The patient then received 1 g of IV vancomycin. Frozen section was negative. Labs were normal.  Previous surgical incision was elliptically excised. MD DEUCE Singleton/[email or web address]  D:  02/06/2020 15:56  T:  02/07/2020 2:35  JOB #:  [id number]  CC:   MD Vidhi Saini NP

## 2020-02-07 NOTE — PROGRESS NOTES
02/06/20 2029   Oxygen Therapy   O2 Sat (%) 97 %   Pulse via Oximetry 100 beats per minute   O2 Device Room air   Incentive Spirometry Treatment   Actual Volume (ml) 1000 ml   Patient placed on continuous oxygen monitor with no complications noted

## 2020-02-08 VITALS
BODY MASS INDEX: 25.1 KG/M2 | WEIGHT: 156.2 LBS | TEMPERATURE: 98 F | HEIGHT: 66 IN | HEART RATE: 97 BPM | OXYGEN SATURATION: 95 % | DIASTOLIC BLOOD PRESSURE: 64 MMHG | SYSTOLIC BLOOD PRESSURE: 111 MMHG | RESPIRATION RATE: 16 BRPM

## 2020-02-08 LAB
ANION GAP SERPL CALC-SCNC: 5 MMOL/L (ref 7–16)
BUN SERPL-MCNC: 9 MG/DL (ref 6–23)
CALCIUM SERPL-MCNC: 8.1 MG/DL (ref 8.3–10.4)
CHLORIDE SERPL-SCNC: 107 MMOL/L (ref 98–107)
CO2 SERPL-SCNC: 27 MMOL/L (ref 21–32)
CREAT SERPL-MCNC: 0.6 MG/DL (ref 0.6–1)
ERYTHROCYTE [DISTWIDTH] IN BLOOD BY AUTOMATED COUNT: 13.7 % (ref 11.9–14.6)
GLUCOSE SERPL-MCNC: 118 MG/DL (ref 65–100)
HCT VFR BLD AUTO: 25.6 % (ref 35.8–46.3)
HGB BLD-MCNC: 8.3 G/DL (ref 11.7–15.4)
MAGNESIUM SERPL-MCNC: 2.1 MG/DL (ref 1.8–2.4)
MCH RBC QN AUTO: 30.9 PG (ref 26.1–32.9)
MCHC RBC AUTO-ENTMCNC: 32.4 G/DL (ref 31.4–35)
MCV RBC AUTO: 95.2 FL (ref 79.6–97.8)
NRBC # BLD: 0 K/UL (ref 0–0.2)
PLATELET # BLD AUTO: 215 K/UL (ref 150–450)
PMV BLD AUTO: 10.1 FL (ref 9.4–12.3)
POTASSIUM SERPL-SCNC: 3.7 MMOL/L (ref 3.5–5.1)
RBC # BLD AUTO: 2.69 M/UL (ref 4.05–5.2)
SODIUM SERPL-SCNC: 139 MMOL/L (ref 136–145)
WBC # BLD AUTO: 8.1 K/UL (ref 4.3–11.1)

## 2020-02-08 PROCEDURE — 80048 BASIC METABOLIC PNL TOTAL CA: CPT

## 2020-02-08 PROCEDURE — 85027 COMPLETE CBC AUTOMATED: CPT

## 2020-02-08 PROCEDURE — 74011250636 HC RX REV CODE- 250/636: Performed by: NURSE PRACTITIONER

## 2020-02-08 PROCEDURE — 36415 COLL VENOUS BLD VENIPUNCTURE: CPT

## 2020-02-08 PROCEDURE — 83735 ASSAY OF MAGNESIUM: CPT

## 2020-02-08 PROCEDURE — 74011250637 HC RX REV CODE- 250/637: Performed by: NURSE PRACTITIONER

## 2020-02-08 PROCEDURE — 97110 THERAPEUTIC EXERCISES: CPT

## 2020-02-08 RX ADMIN — FERROUS SULFATE TAB 325 MG (65 MG ELEMENTAL FE) 325 MG: 325 (65 FE) TAB at 08:45

## 2020-02-08 RX ADMIN — MULTIPLE VITAMINS W/ MINERALS TAB 1 TABLET: TAB at 08:45

## 2020-02-08 RX ADMIN — ACETAMINOPHEN 650 MG: 325 TABLET, FILM COATED ORAL at 05:50

## 2020-02-08 RX ADMIN — DOCUSATE SODIUM 100 MG: 100 CAPSULE, LIQUID FILLED ORAL at 08:45

## 2020-02-08 RX ADMIN — VANCOMYCIN HYDROCHLORIDE 1000 MG: 1 INJECTION, POWDER, LYOPHILIZED, FOR SOLUTION INTRAVENOUS at 02:00

## 2020-02-08 NOTE — DISCHARGE SUMMARY
[address] [facility] SUMMARY    Name:  Viktor Workman  MR#:  [id number]  :  1960  ACCOUNT #:  [de-identified]  ADMIT DATE:  2020  DISCHARGE DATE:  2020    ADMISSION DIAGNOSIS:  Painful reverse left total shoulder arthroplasty. DISCHARGE DIAGNOSIS:  Painful reverse left total shoulder arthroplasty. Please see H and P, operative summaries, and consult for details. HOSPITAL COURSE:  The patient is a 60-year-old female, who was admitted on 2020, underwent an uncomplicated I and D, removal of implant left shoulder \"Dahiana\", reverse left total shoulder arthroplasty with a proximal humeral osteotomy and revision reverse left total shoulder arthroplasty with a long stem Delta Xtend prosthesis, biceps tenodesis, latissimus dorsi and teres major tendon transfer. On postoperative day #1, the patient was maintained on vancomycin, hemoglobin was 9.1, potassium and magnesium were within normal limits. Cultures showed no growth to date. She was having significant pain. On postoperative day #2, all labs were within normal limits. Her cultures were still negative. Her pain was controlled. She was discharged home on postoperative day #2. She was discharged home on doxycycline. She will continue therapy on the outside and follow up in my office in 10 days. Dawson Velazquez MD      [id number]  D:  2020 8:01  T:  2020 12:07  JOB #:  [id number]  CC:   Rosa Oppenheim, MD

## 2020-02-08 NOTE — PROGRESS NOTES
Resting comfortably,dsng D/I. NV status WDL. SCDs on bilaterally. Denies needs at present. Call light within reach.

## 2020-02-08 NOTE — PROGRESS NOTES
Care Management Interventions  PCP Verified by CM:  Yes  Mode of Transport at Discharge: Self  Transition of Care Consult (CM Consult): [address]: Yes  Physical Therapy Consult: Yes  Current Support Network: Lives with Spouse  Discharge Location  Discharge Placement: Home with home health

## 2020-02-08 NOTE — PROGRESS NOTES
Patient in recliner  Discharge education given and pt verbalized understanding  Dressing on shoulder intact  Pt denies pain at this time   Pt to be taken out in wheel chair

## 2020-02-08 NOTE — PROGRESS NOTES
Problem: Mobility Impaired (Adult and Pediatric)  Goal: *Acute Goals and Plan of Care (Insert Text)  Description  GOALS (1-4 days):  (1.)  Patient will move from supine to sit and sit to supine  in bed with SUPERVISION. (2.)  Patient will transfer from bed to chair and chair to bed with SUPERVISION using the least restrictive device. (3.)  Patient will ambulate with SUPERVISION for 200 feet with the least restrictive device. (4.)  Patient will be independent with shoulder HEP to increase range of motion per MD orders.   ________________________________________________________________________________________________   Outcome: Progressing Towards Goal     PHYSICAL THERAPY: Daily Note and AM 2/8/2020  INPATIENT: Hospital Day: 3  Payor: [employer] / Plan: YOLI Ozuna / Product Type: Commerical /      NAME/AGE/GENDER: Le Hough is a 63 y.o. female   PRIMARY DIAGNOSIS: Presence of left artificial shoulder joint [Z96.612]  Pain due to internal orthopedic prosthetic device, initial encounter ([facility] [address] [T84.84XA]  Artificial joint pain (HCC) [T84.84XA]  Arthropathy, transient, shoulder, left [M12.812] Pain due to left shoulder joint prosthesis ([facility] [address] Pain due to left shoulder joint prosthesis (HCC)  Procedure(s) (LRB):  INCISION, DEBRIDEMENT, REMOVAL IMPLANT LEFT SHOULDER \"REGINA REVERSE LEFT TSA\" WITH PROXIMAL HUMERAL OSTEOTOMY                                     2.  REVISION REVERSE LEFT TOTAL SHOULDER ARTHROPLASTY WITH LONG STEM DELTA EXTEND PROSTHESIS, BICEPS TENODESIS, LATISSIMUS DORSI AND TERES MAJOR TENDON TRANSFERS  (Left)  2 Days Post-Op  ICD-10: Treatment Diagnosis:   · Pain in Left Shoulder (M25.512)  · Stiffness of Left Shoulder, Not elsewhere classified (M25.612)  · Difficulty in walking, Not elsewhere classified (R26.2)   Precaution/Allergies:  Codeine      ASSESSMENT:     Ms. Nia Morel is sitting up in chair on arrival. States that her pain is better controlled today. Exercises per chart below while sitting up in chair. Tolerated exercise with minimal c/o and with proper form and technique. This section established at most recent assessment   PROBLEM LIST (Impairments causing functional limitations):  1. Decreased Douglas with Bed Mobility  2. Decreased Douglas with Transfers  3. Decreased Douglas with Ambulation   4. Decreased Douglas with shoulder HEP   INTERVENTIONS PLANNED: (Benefits and precautions of physical therapy have been discussed with the patient.)  1. Bed Mobility Training  2. Transfer Training  3. Gait Training  4. Therapeutic Exercises per MD orders  5. Modalities for Pain     TREATMENT PLAN: Frequency/Duration: twice daily for duration of hospital stay  Rehabilitation Potential For Stated Goals: Excellent     RECOMMENDED REHABILITATION/EQUIPMENT: (at time of discharge pending progress): Continue Skilled Therapy and Home Health: Physical Therapy. HISTORY:   History of Present Injury/Illness (Reason for Referral): Admitted for L shoulder revision  Past Medical History/Comorbidities:   Ms. Sung Waterman  has a past medical history of Allergic rhinitis (3/7/2013), Chronic sinusitis, Deviated nasal septum, Eustachian tube dysfunction, Fatigue (3/7/2013), High frequency hearing loss, Hypertrophy of nasal turbinates, Migraine headache, Otalgia, SNHL (sensorineural hearing loss), Tinnitus, and TMJ (temporomandibular joint disorder). Ms. Sung Waterman  has a past surgical history that includes hx gyn (2009) and hx orthopaedic (Left, 2018).   Social History/Living Environment:   Home Environment: Private residence  One/Two Story Residence: Two story  Living Alone: No  Support Systems: Spouse/Significant Other/Partner  Patient Expects to be Discharged to[de-identified] Private residence  Current DME Used/Available at Home: None  Prior Level of Function/Work/Activity:  Independent    Number of Personal Factors/Comorbidities that affect the Plan of Care: 0: LOW COMPLEXITY   EXAMINATION:   Most Recent Physical Functioning:   Gross Assessment:                  Posture:     Balance:    Bed Mobility:     Wheelchair Mobility:     Transfers:  Sit to Stand: Stand-by assistance  Stand to Sit: Stand-by assistance  Bed to Chair: Stand-by assistance  Gait:     Ambulation - Level of Assistance: Stand-by assistance      Body Structures Involved:  1. Joints  2. Muscles Body Functions Affected:  1. Movement Related Activities and Participation Affected:  1. Mobility   Number of elements that affect the Plan of Care: 4+: HIGH COMPLEXITY   CLINICAL PRESENTATION:   Presentation: Stable and uncomplicated: LOW COMPLEXITY   CLINICAL DECISION MAKIN[address] [place] [address] AM-PAC 6 Clicks   Basic Mobility Inpatient Short Form  How much difficulty does the patient currently have. .. Unable A Lot A Little None   1. Turning over in bed (including adjusting bedclothes, sheets and blankets)? [] 1   [] 2   [x] 3   [] 4   2. Sitting down on and standing up from a chair with arms ( e.g., wheelchair, bedside commode, etc.)   [] 1   [] 2   [x] 3   [] 4   3. Moving from lying on back to sitting on the side of the bed? [] 1   [] 2   [x] 3   [] 4   How much help from another person does the patient currently need. .. Total A Lot A Little None   4. Moving to and from a bed to a chair (including a wheelchair)? [] 1   [] 2   [x] 3   [] 4   5. Need to walk in hospital room? [] 1   [] 2   [x] 3   [] 4   6. Climbing 3-5 steps with a railing? [] 1   [] 2   [x] 3   [] 4   © , Trustees of [address] [place], [place] [id number], under license to [employer]. All rights reserved      Score:  Initial: 18 Most Recent: X (Date: -- )    Interpretation of Tool:  Represents activities that are increasingly more difficult (i.e. Bed mobility, Transfers, Gait).     Medical Necessity:     · Patient is expected to demonstrate progress in   · strength and range of motion  ·  to   · increase independence with HEP and ROM · .  Reason for Services/Other Comments:  · Patient continues to require skilled intervention due to   · Limited functional independence   · . Use of outcome tool(s) and clinical judgement create a POC that gives a: Clear prediction of patient's progress: LOW COMPLEXITY            TREATMENT:   (In addition to Assessment/Re-Assessment sessions the following treatments were rendered)   Pre-treatment Symptoms/Complaints:  Pt reports that her pain is better controlled today. Pain: Initial:  Not rated  Pain Intensity 1: 5  Post Session:  5     Therapeutic Activity: (    0 minutes): Therapeutic activities including Chair transfers to improve mobility, strength and balance. Required minimal   to promote dynamic balance in standing. Therapeutic Exercise: (15 Minutes):  Exercises per grid below to improve mobility and strength. Required minimal verbal cues to promote proper body alignment. 20 Date:  2/7 Date:  02/08/20 Date:     ACTIVITY/EXERCISE AM PM AM PM AM PM   Gripping 10 15 20      Wrist Flexion/Extension 10 15 20      Wrist Ulnar/Radial Deviation         Pronation/Supination 10 15 20      Elbow Flexion/Extension 10 15       Shoulder Flexion/Extension         Shoulder AB/ADduction         Shoulder IR/ER         Pulleys         Pendulums  15 sitting 20 sitting      Shrugs         Isometric:                 Flexion         Extension         ABduction         ADduction         Biceps/Triceps                  B = bilateral; AA = active assistive; A = active; P = passive  Education:  [x]  Home Exercises  [x]  Sling Application   []  Movement Precautions   []  Pulleys   [x]  Use of Ice   []  Other:   Treatment/Session Assessment:    · Response to Treatment:  No c/o dizziness today.   Pt demonstrated proper form and technique with all therapeutic exercise  · Interdisciplinary Collaboration:   o Physical Therapist  · After treatment position/precautions:   o Up in chair  o Bed/Chair-wheels locked  o Bed in low position  o Call light within reach  o Family at bedside   · Compliance with Program/Exercises: compliant all of the time. · Recommendations/Intent for next treatment session:  Treatment next visit will focus on increasing Ms. Francis's independence with bed mobility, transfers, gait training, strength/ROM exercises, modalities for pain, and patient education.    Total Treatment Duration:       Arnulfo Milian, PT

## 2020-02-08 NOTE — DISCHARGE INSTRUCTIONS
DISCHARGE SUMMARY from Nurse    The following personal items collected during your admission are returned to you:   Dental Appliance: Dental Appliances: None  Vision: Visual Aid: None  Hearing Aid:    Jewelry:    Clothing:    Other Valuables:    Valuables sent to safe:            PATIENT INSTRUCTIONS:    After general anesthesia or intravenous sedation, for 24 hours or while taking prescription Narcotics:  · Limit your activities  · Do not drive and operate hazardous machinery  · Do not make important personal or business decisions  · Do  not drink alcoholic beverages  · If you have not urinated within 8 hours after discharge, please contact your surgeon on call. Report the following to your surgeon:  · Excessive pain, swelling, redness or odor of or around the surgical area  · Temperature over 101  · Nausea and vomiting lasting longer than 4 hours or if unable to take medications  · Any signs of decreased circulation or nerve impairment to extremity: change in color, persistent  numbness, tingling, coldness or increase pain  · Any questions, call office @ [phone]. What to do at Home:  Recommended activity: activity as tolerated, as instructed per Dr. Brock Libman. Use ice and elevate arm to decrease pain and swelling. Wear sling to surgical arm. Okay to shower. Resume pre hospital diet. Continue with exercises taught by PT. If you experience any of the following symptoms temp>101, pain unrelieved by meds, or persistent nausea or vomiting, please follow up with Dr. Leo Orlando @ [phone]. *  Please give a list of your current medications to your Primary Care Provider. *  Please update this list whenever your medications are discontinued, doses are      changed, or new medications (including over-the-counter products) are added. *  Please carry medication information at all times in case of emergency situations.           These are general instructions for a healthy lifestyle:    No smoking/ No tobacco products/ Avoid exposure to second hand smoke    Surgeon General's Warning:  Quitting smoking now greatly reduces serious risk to your health. Obesity, smoking, and sedentary lifestyle greatly increases your risk for illness    A healthy diet, regular physical exercise & weight monitoring are important for maintaining a healthy lifestyle    You may be retaining fluid if you have a history of heart failure or if you experience any of the following symptoms:  Weight gain of 3 pounds or more overnight or 5 pounds in a week, increased swelling in our hands or feet or shortness of breath while lying flat in bed. Please call your doctor as soon as you notice any of these symptoms; do not wait until your next office visit. Recognize signs and symptoms of STROKE:    F-face looks uneven    A-arms unable to move or move unevenly    S-speech slurred or non-existent    T-time-call 911 as soon as signs and symptoms begin-DO NOT go       Back to bed or wait to see if you get better-TIME IS BRAIN.

## 2020-02-08 NOTE — PROGRESS NOTES
Orthopedic Joint Progress Note    2020  Admit Date: 2020  Admit Diagnosis: Presence of left artificial shoulder joint [Z96.612]  Pain due to internal orthopedic prosthetic device, initial encounter [facility]) [T84.84XA]  Artificial joint pain ([address] [T84.84XA]  Arthropathy, transient, shoulder, left [M12.812]    2 Days Post-Op    Subjective: doing well pain controlled today     [facility]     Review of Systems: Pertinent items are noted in HPI. Objective:     PT/OT:     PATIENT MOBILITY    Bed Mobility  Supine to Sit: Contact guard assistance  Sit to Supine: Minimum assistance  Scooting: Stand-by assistance  Transfers  Sit to Stand: Contact guard assistance  Stand to Sit: Contact guard assistance  Bed to Chair: Contact guard assistance      Gait  Ambulation - Level of Assistance: Contact guard assistance            Vital Signs:    Blood pressure 108/72, pulse 94, temperature 98.6 °F (37 °C), resp. rate 16, height 5' 6\" (1.676 m), weight 70.9 kg (156 lb 3.2 oz), SpO2 93 %.   Temp (24hrs), Av.2 °F (36.8 °C), Min:97 °F (36.1 °C), Max:98.8 °F (37.1 °C)      Pain Control:   Pain Assessment  Pain Scale 1: FLACC  Pain Intensity 1: 0  Pain Onset 1: post op  Pain Location 1: Shoulder  Pain Orientation 1: Left  Pain Description 1: Aching  Pain Intervention(s) 1: Medication (see MAR)    Meds:  Current Facility-Administered Medications   Medication Dose Route Frequency    acetaminophen (TYLENOL) tablet 325 mg  325 mg Oral Q4H PRN    calcium-vitamin D (OSCAL 250) tablet 2 Tab  2 Tab Oral DAILY    multivitamin, tx-iron-ca-min (THERA-M w/ IRON) tablet 1 Tab  1 Tab Oral DAILY    sodium chloride (NS) flush 5-40 mL  5-40 mL IntraVENous PRN    acetaminophen (TYLENOL) tablet 650 mg  650 mg Oral Q4H PRN    HYDROmorphone (PF) (DILAUDID) injection 1 mg  1 mg IntraVENous Q1H PRN    ondansetron (ZOFRAN) injection 4 mg  4 mg IntraVENous Q4H PRN    vancomycin (VANCOCIN) 1,000 mg in 0.9% sodium chloride (MBP/ADV) 250 mL  1,000 mg IntraVENous Q12H    bisacodyL (DULCOLAX) suppository 10 mg  10 mg Rectal DAILY PRN    docusate sodium (COLACE) capsule 100 mg  100 mg Oral DAILY    ferrous sulfate tablet 325 mg  1 Tab Oral DAILY WITH BREAKFAST    HYDROmorphone (DILAUDID) tablet 2 mg  2 mg Oral Q3H PRN    magnesium citrate solution 296 mL  296 mL Oral DAILY PRN    sodium phosphate (FLEET'S) enema 1 Enema  1 Enema Rectal DAILY PRN    temazepam (RESTORIL) capsule 15 mg  15 mg Oral QHS PRN    tuberculin injection 5 Units  5 Units IntraDERMal ONCE        LAB:    Lab Results   Component Value Date/Time    INR 0.9 01/30/2020 01:13 PM     Lab Results   Component Value Date/Time    HGB 8.3 (L) 02/08/2020 04:14 AM    HGB 9.1 (L) 02/07/2020 04:14 AM    HGB 11.8 01/30/2020 01:13 PM       Wound Shoulder Left (Active)   Dressing Status Clean, dry, and intact 2/7/2020  7:19 PM   Dressing Type 4 x 4;Transparent film;Xeroform 2/7/2020  7:19 PM   Splint Type/Material Sling 2/6/2020  5:55 PM   Drainage Amount None 2/6/2020  5:55 PM   Number of days: 2         Physical Exam:  No significant changes    Assessment:      Principal Problem:    Pain due to left shoulder joint prosthesis (HCC) (1/30/2020)    Active Problems:    S/P reverse total shoulder arthroplasty, left (1/30/2020)      Artificial joint pain (HCC) (2/6/2020)      Arthropathy, transient, shoulder, left (2/6/2020)         Plan:     Continue PT/OT/Rehab  Cultures no growth  Discharge home today on doxycycline    Patient Expects to be Discharged to[de-identified] Private residence

## 2020-02-09 LAB
ABO + RH BLD: NORMAL
BACTERIA SPEC CULT: NORMAL
BLD PROD TYP BPU: NORMAL
BLD PROD TYP BPU: NORMAL
BLOOD GROUP ANTIBODIES SERPL: NORMAL
BPU ID: NORMAL
BPU ID: NORMAL
CROSSMATCH RESULT,%XM: NORMAL
CROSSMATCH RESULT,%XM: NORMAL
GRAM STN SPEC: NORMAL
SERVICE CMNT-IMP: NORMAL
SPECIMEN EXP DATE BLD: NORMAL
STATUS OF UNIT,%ST: NORMAL
STATUS OF UNIT,%ST: NORMAL
UNIT DIVISION, %UDIV: 0
UNIT DIVISION, %UDIV: 0

## 2020-02-10 ENCOUNTER — HOME CARE VISIT (OUTPATIENT)
Dept: SCHEDULING | Facility: HOME HEALTH | Age: 60
End: 2020-02-10
Payer: COMMERCIAL

## 2020-02-10 VITALS
SYSTOLIC BLOOD PRESSURE: 118 MMHG | HEART RATE: 80 BPM | RESPIRATION RATE: 16 BRPM | OXYGEN SATURATION: 99 % | DIASTOLIC BLOOD PRESSURE: 64 MMHG | TEMPERATURE: 99.7 F

## 2020-02-10 PROCEDURE — 400013 HH SOC

## 2020-02-10 PROCEDURE — G0151 HHCP-SERV OF PT,EA 15 MIN: HCPCS

## 2020-02-11 ENCOUNTER — HOME CARE VISIT (OUTPATIENT)
Dept: HOME HEALTH SERVICES | Facility: HOME HEALTH | Age: 60
End: 2020-02-11
Payer: COMMERCIAL

## 2020-02-11 VITALS
DIASTOLIC BLOOD PRESSURE: 60 MMHG | TEMPERATURE: 98.7 F | RESPIRATION RATE: 16 BRPM | SYSTOLIC BLOOD PRESSURE: 108 MMHG | HEART RATE: 82 BPM

## 2020-02-11 PROCEDURE — G0152 HHCP-SERV OF OT,EA 15 MIN: HCPCS

## 2020-02-13 ENCOUNTER — HOME CARE VISIT (OUTPATIENT)
Dept: SCHEDULING | Facility: HOME HEALTH | Age: 60
End: 2020-02-13
Payer: COMMERCIAL

## 2020-02-13 PROCEDURE — G0157 HHC PT ASSISTANT EA 15: HCPCS

## 2020-02-18 ENCOUNTER — HOME CARE VISIT (OUTPATIENT)
Dept: SCHEDULING | Facility: HOME HEALTH | Age: 60
End: 2020-02-18
Payer: COMMERCIAL

## 2020-02-18 VITALS
HEART RATE: 80 BPM | TEMPERATURE: 97.7 F | RESPIRATION RATE: 17 BRPM | DIASTOLIC BLOOD PRESSURE: 78 MMHG | SYSTOLIC BLOOD PRESSURE: 116 MMHG

## 2020-02-18 PROCEDURE — G0157 HHC PT ASSISTANT EA 15: HCPCS

## 2020-02-19 ENCOUNTER — HOME CARE VISIT (OUTPATIENT)
Dept: SCHEDULING | Facility: HOME HEALTH | Age: 60
End: 2020-02-19
Payer: COMMERCIAL

## 2020-02-19 PROCEDURE — G0151 HHCP-SERV OF PT,EA 15 MIN: HCPCS

## 2020-02-20 ENCOUNTER — HOSPITAL ENCOUNTER (OUTPATIENT)
Dept: PHYSICAL THERAPY | Age: 60
Discharge: HOME | End: 2020-02-20
Payer: COMMERCIAL

## 2020-02-20 PROCEDURE — 97162 PT EVAL MOD COMPLEX 30 MIN: CPT

## 2020-02-20 NOTE — THERAPY EVALUATION
Sy Amaya  : 1960  Primary: Sc [employer], In*  Secondary:  [address]  at [facility] DULCE ANGUIANO  1101 [facility], [address] [place] [address],[address], [facility] [address].  [phone]   [phone]       OUTPATIENT PHYSICAL [place] Assessment 2020     ICD-10: Treatment Diagnosis: Pain in left shoulder (M25.512); Presence of left artificial shoulder joint (E51.972)  Precautions/Allergies: Post op precautions. From EMR: Codeine   TREATMENT PLAN:  Effective Dates: 2020 TO 2020 (90 days). Frequency/Duration: 2 times a week for 90 Day(s) MEDICAL/REFERRING DIAGNOSIS:s/p L shoulder I&D, removal of [place] implant, revision reverse TSA, biceps tendonesis, lat dorsi and teres major tendon transfer  L shoulder   DATE OF ONSET: 20 surgery  REFERRING PHYSICIAN: Alena Mcguire MD MD Orders: Eval and Treat; HEP; ROM; \"PROM, AAROM\" (20)  Return MD Appointment: 3/4/20     INITIAL ASSESSMENT:  Ms. Fadi Garcia presents to the clinic 2 weeks s/p Incision, debridement, removal of implant, left shoulder \"Dahiana\" reverse left total shoulder arthroplasty with a proximal humeral osteotomy and revision reverse L total shoulder arthroplasty with a long stem Delta Xtend prosthesis, biceps tenodesis, latissimus dorsi and teres major tendon transfer. She appears to be doing very well at this point. She does have pain to the shoulder, but she reports it is manageable. She has very good shoulder PROM for 2 weeks post op, and she is able to activate deltoid with short arc AROM elevation. Post-op pain, weakness and decreased ROM are limiting normalized use and function of her L/non-dominant UE. She will benefit from PT for guided post-op shoulder rehab to promote safe return to normalized use of the UE with her normal ADL and work activities. This is a revision procedure, and her original injury was in May 2018, so anticipate a slower rehab process.     PROBLEM LIST (Impacting functional limitations):  1. Post-op L shoulder pain  2. Decreased L shoulder ROM   3. Weakness L shoulder INTERVENTIONS PLANNED: (Treatment may consist of any combination of the following)  1. Thermal and electric modalities, manual therapies for pain   2. Manual therapies, therapeutic exercises, HEP for ROM    3. Therapeutic exercises and HEP for strength     GOALS: (Goals have been discussed and agreed upon with patient.)  Short-Term Functional Goals: Time Frame: 4 weeks  1. Report no more than 4/10 intermittent pain to L shoulder with compensatory use during basic functional activities, and score less than 40% on the DASH. 2. L shoulder PROM forward elevation greater than 150 degrees to progress functional ranges. 3. Demonstrate good L shoulder/deltoid isometric strength with manual testing to progress into strength phase. 4. Independent with initial HEP. Discharge Goals: Time Frame: 10 weeks  1. No more than 3/10 intermittent pain L shoulder with return to normalized household and work activities, and score less than 25% on the DASH. 2. L shoulder AROM forward elevation greater than 135 degrees for use with normalized ADL and work activities. 3. Demonstrate good functional shoulder strength and endurance for return to normalized household and work activities. 4. Independent with advanced shoulder HEP for continued self-management. OUTCOME MEASURE:   Tool Used: Disabilities of the Arm, Shoulder and Hand (DASH) Questionnaire - Quick Version  Score:  Initial: 45/55 or 77% disability Most Recent: X/55 (Date: -- )   Interpretation of Score: The DASH is designed to measure the activities of daily living in person's with upper extremity dysfunction or pain. Each section is scored on a 1-5 scale, 5 representing the greatest disability. The scores of each section are added together for a total score of 55.   This number is divided by 11, followed by subtracting 1 and multiplying by 25 to get a percent score of disability. This value represents the percentage disability: 0-20% minimal disability; 20-40% moderate disability; 40-60% severe disability; % dependent for care or exaggerated symptom behavior. Minimal detectable change is 12%. MEDICAL NECESSITY:   · Patient is expected to demonstrate progress in strength and range of motion to progress to funcitonal use of her L/non-dominant UE. · 2 weeks post op revision reverse TSA  · Current impairments and post op precautions limiting use of L/non-dominant UE  REASON FOR SERVICES/OTHER COMMENTS:  · Patient continues to require skilled intervention due to the complexity of the surgical procedure and need for safe, skilled progressive post op rehab to return to functinoal use of the L/non-dominat UE. Total Duration:  PT Patient Time In/Time Out  Time In: 1515  Time Out: 1615    Rehabilitation Potential For Stated Goals: Good to Excellent  Regarding Darlene Francis's therapy, I certify that the treatment plan above will be carried out by a therapist or under their direction. Thank you for this referral,    Miller Castro, PT, MSPT, OCS       Referring Physician Signature: Sergio Leventhal, NP _______________________________ Date _____________       PAIN/SUBJECTIVE:   Initial:    Post Session:     HISTORY:   History of Injury/Illness (Reason for Referral): Fall onto L shoulder in May 2018 resulting in a proximal humerus fracture. Reverse TSA was done on 6/11/18 to fix. She did post op PT for 6 months, but poor outcome with continued pain, inability to elevate the arm. Second opinion sought and this procedure for hardware removal and revision reverse TSA was done on 2/6/20. Hospitalized 2 days, then to home with Home Health PT for 2 weeks. Has been managing with post HEP, icing, meds. Sutures removed at last MD follow up. Has sling and now wares only when out of house. Goal is to \"regain use of arm. \"  Past Medical History/Comorbidities: s/p L revision reverse TSA 2/6/20; /p L reverse TSA 6/11/18. Other PMH from EMR:   Ms. Lonnell Schwab  has a past medical history of Allergic rhinitis (3/7/2013), Chronic sinusitis, Deviated nasal septum, Eustachian tube dysfunction, Fatigue (3/7/2013), High frequency hearing loss, Hypertrophy of nasal turbinates, Migraine headache, Otalgia, SNHL (sensorineural hearing loss), Tinnitus, and TMJ (temporomandibular joint disorder). Ms. Lonnell Schwab  has a past surgical history that includes hx gyn (2009) and hx orthopaedic (Left, 2018). Social History/Living Environment: Lives with her . Prior Level of Function/Work/Activity: Independent with all activities and use of her L/non-dominant UE prior to this injury and surgeries. Was quite limited with L UE use prior to this most recent surgery. 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 Dominant Side: RIGHT   Ambulatory/Rehab Services H2 Model Falls Risk Assessment   Risk Factors:       No Risk Factors Identified Ability to Rise from Chair:       (1)  Pushes up, successful in one attempt   Falls Prevention Plan:       No modifications necessary   Total: (5 or greater = High Risk): 1   ©2010 [facility] of [employer]. All Rights Reserved. [facility] Patent #[id number]. Federal Law prohibits the replication, distribution or use without written permission from [facility] [employer]   Current Medications: From EMR:      Current Outpatient Medications:     promethazine (PHENERGAN) 25 mg tablet, Take 25 mg by mouth every eight (8) hours as needed for Nausea., Disp: , Rfl:     temazepam (RESTORIL) 15 mg capsule, Take 15 mg by mouth nightly as needed for Sleep., Disp: , Rfl:     HYDROmorphone (DILAUDID) 2 mg tablet, Take 2 mg by mouth every four (4) hours as needed for Pain. Pt may take 1-2 tablets Q4-6hrs PRN for pain, Disp: , Rfl:     senna-docusate (SENOKOT-S) 8.6-50 mg per tablet, Take 2 Tabs by mouth daily as needed for Constipation. , Disp: , Rfl:     multivit,tx with iron,minerals (THEREMS-M PO), Take 1 Tab by mouth daily. , Disp: , Rfl:     glucosamine/chondr costello A sod (OSTEO BI-FLEX PO), Take 2 Tabs by mouth daily. , Disp: , Rfl:     calcium citrate (CITRACAL PO), Take 1 Cap by mouth daily. , Disp: , Rfl:     doxycycline (VIBRAMYCIN) 100 mg capsule, Take 1 Cap by mouth two (2) times a day for 30 days. , Disp: 60 Cap, Rfl: 0    acetaminophen (TYLENOL) 325 mg tablet, Take 500 mg by mouth every six (6) hours as needed for Pain., Disp: , Rfl:     multivitamin (ONE A DAY) tablet, Take 1 Tab by mouth daily. , Disp: , Rfl:     calcium carb/vitamin D2/soyb (ONE-A-DAY BONE STRENGTH PO), Take  by mouth., Disp: , Rfl:     nutritional supplement (BONES & JOINTS PO), Take  by mouth., Disp: , Rfl:    Date Last Reviewed:  2/20/20   Number of Personal Factors/Comorbidities that affect the Plan of Care: 1-2: MODERATE COMPLEXITY   EXAMINATION:      Observation/Orthostatic Postural Assessment:  Comes into clinic with L UE in braced/protective position. Surgical wound to L shoulder healing well. Suture removed. There is marked atrophy to LL deltoid, especially anterior. Palpation: Tender to L superior upper trap, anterior L shoulder, pecs.      ROM:   LUE AROM  L Shoulder Flexion: 80(forward elevation)  LUE PROM  L Shoulder Flexion: 135(forward elevation)  L Shoulder ABduction: 90(with scapula stabilized)  L Shoulder Internal Rotation: 60(supported in shoulder neutral)  L Shoulder External Rotation: 65(suppoerted in shoulder neutral, 70 at 45 abd)   L Elbow, Forearm, Wrist, Hand AROM grossly WNL's.        RUE AROM  R Shoulder Flexion: 165(forward elevation)  R Shoulder Internal Rotation: 70(at 90 deg abd; to T2 behind back.)  R Shoulder External Rotation: 80(by side, 90 at 90 deg abd)  RUE PROM  R Shoulder Flexion: 165(forward elevation)  R Shoulder ABduction: 100(glenohumeral abd with scapula stabilized)  R Shoulder Internal Rotation: 75(stabilized at 90 deg abd)  R Shoulder External Rotation: 90(in shoulder neutral, 95 at 45 abd, 100 at 90 deg abd)         Strength:    L Shoulder Forward Elevation: 3- against gravity. R Shoulder: strong and painless to all with Soft Tissue Differential testing. Special Tests:  None  Neurological Screen: Motor and sensory to L UE intact. Functional Mobility:  Independent with basic mobility. Reports compensatory independent with dressing and grooming ADL's. Body Structures Involved:  1. Bones  2. Joints  3. Muscles Body Functions Affected:  1. Neuromusculoskeletal  2. Movement Related Activities and Participation Affected:  1. General Tasks and Demands  2.  Self Care   Number of elements (examined above) that affect the Plan of Care: 4+: HIGH COMPLEXITY   CLINICAL PRESENTATION:   Presentation: Evolving clinical presentation with changing clinical characteristics: MODERATE COMPLEXITY   CLINICAL DECISION MAKING:   Use of outcome tool(s) and clinical judgement create a POC that gives a: Questionable prediction of patient's progress: MODERATE COMPLEXITY

## 2020-02-20 NOTE — PROGRESS NOTES
Aryan Bachelor  : 1960  Payor: EBER [facility], [address] [place] [address] / Plan: [place] PLANNED Olga Malik. / Product Type: Commerical /  [address]  at [facility] DULCE ANGUIANO  1101 [facility], [address], [address] [place] [address]  [phone]   [phone]       OUTPATIENT PHYSICAL THERAPY: Daily Treatment Note 2020  Visit Count: 1     ICD-10: Treatment Diagnosis: Pain in left shoulder (M25.512); Presence of left artificial shoulder joint (P21.023)  Precautions/Allergies: Post op precautions. From EMR: Codeine   TREATMENT PLAN:  Effective Dates: 2020 TO 2020 (90 days). Frequency/Duration: 2 times a week for 90 Day(s) MEDICAL/REFERRING DIAGNOSIS:s/p L shoulder I&D, removal of [place] implant, revision reverse TSA, biceps tendonesis, lat dorsi and teres major tendon transfer  L shoulder   DATE OF ONSET: 20 surgery  REFERRING PHYSICIAN: Ama Mcguire MD MD Orders: Eval and Treat; HEP; ROM; \"PROM, AAROM\" (20)  Return MD Appointment: 3/4/20       Pre-treatment Symptoms/Complaints: See evaluation from today. Pain: Initial:   Pain under control Post Session:  Less after icing. Medications Last Reviewed: 20    Updated Objective Findings:   See evaluation note from today     TREATMENT:     Therapeutic Exercise: (5 Minutes): Reviewed existing post-op HEP, including cervical, scapular, elbow, forearm, wrist and hand AROM; arm hangs, and pulleys. Added supine wand flexion to 90, and standing hand on table walk-outs. Good return demonstration with brief practice. Therapeutic Modalities: (15 Minutes): Cold/compression to L shoulder for post treatment soreness using Game Ready, low pressure, 40-deg F, x15' while seated. HEP: Verbal instruction for the post op exercises above. Advised to use sling as needed, and to allow natural hang and swing of L UE with standing and walking. She verbalizes understanding.     Treatment/Session Summary:    · Response to Treatment: She is 2 weeks post op revision reverse TSA. Pain is under control. Very good shoulder PROM and good deltoid muscle activation. Good understanding of initial post op exercises. · Communication/Consultation:  None today  · Equipment provided today:  None today  · Recommendations/Intent for next treatment session: Continue with shoulder motion treatment, low level muscle activation to deltoid and scapular stabilizers, and modalities as needed for pain.     Total Treatment Billable Duration:  PT Patient Time In/Time Out  Time In: 1222  Time Out: DAMEON Herrera    Future Appointments   Date Time Provider Asuncion Mccormick   2/24/2020  2:30 PM Zurdo Kramer, PT [facility]   2/27/2020  1:45 PM Zurdo Kramer, PT [facility]   3/2/2020  2:30 PM Zurdo Kramer, PT [facility]   3/5/2020  2:30 PM [facility]   3/9/2020  2:30 PM [facility]   3/12/2020  2:30 PM [facility]ramer[facility]

## 2020-02-21 VITALS
SYSTOLIC BLOOD PRESSURE: 122 MMHG | DIASTOLIC BLOOD PRESSURE: 68 MMHG | HEART RATE: 84 BPM | RESPIRATION RATE: 19 BRPM | TEMPERATURE: 98.3 F

## 2020-02-24 ENCOUNTER — HOSPITAL ENCOUNTER (OUTPATIENT)
Dept: PHYSICAL THERAPY | Age: 60
Discharge: HOME | End: 2020-02-24
Payer: COMMERCIAL

## 2020-02-24 PROCEDURE — 97110 THERAPEUTIC EXERCISES: CPT

## 2020-02-24 NOTE — PROGRESS NOTES
Frances Roman  : 1960  Payor: EBER [facility], [address] [place] [address] Road / Plan: [place] EBER Puneet Kerr. / Product Type: Commerical /  [address]  at [facility] DULCE ANGUIANO  1101 [facility], [address], [place] [address].  [phone]   [phone]       OUTPATIENT PHYSICAL THERAPY: Daily Treatment Note 2020  Visit Count: 2     ICD-10: Treatment Diagnosis: Pain in left shoulder (M25.512); Presence of left artificial shoulder joint (G88.849)  Precautions/Allergies: Post op precautions. From EMR: Codeine   TREATMENT PLAN:  Effective Dates: 2020 TO 2020 (90 days). Frequency/Duration: 2 times a week for 90 Day(s) MEDICAL/REFERRING DIAGNOSIS:s/p L shoulder I&D, removal of [place] implant, revision reverse TSA, biceps tendonesis, lat dorsi and teres major tendon transfer  L shoulder   DATE OF ONSET: 20 surgery  REFERRING PHYSICIAN: Jaydon Mcguire MD MD Orders: Eval and Treat; HEP; ROM; \"PROM, AAROM\" (20)  Return MD Appointment: 3/4/20       Pre-treatment Symptoms/Complaints: Says her shoulder did \"pretty good\" over the weekend. It is more sore today. Has been doing her HEP 3x/day. No trouble with them. Not in the sling much. Has not been out of the house too much recently. Not driving yet. Pain: Initial:   5/10 Post Session:  Less after icing. Medications Last Reviewed: 20    Updated Objective Findings:   No new measure. TREATMENT:     Therapeutic Exercise: (40 Minutes): Reviewed and performed pulleys to scaption 2x10 for motion warm up; verbal review of other HEP. She is independent with these.    Motion exercise to L shoulder with PROM x10 each and physiologic hold/relax oscillations in free range to empty end feel 3x20-30\" each, while in supine to ER at 30 and 45 to 60 degrees abduction, IR short arc with glenohumeral joint stabilized at 45 degrees abduction, abduction with scapula stabilized, and forward elevation; PROM to elbow extension x10 and assisted active isolated stretch to wrist flexors and extensors, 3\"x10 each. Low level muscle activation to L shoulder/deltoid with place and hold in supine 0-deg ER and 100-deg flexion, 2x20-30\" each, then light manual resisted alternating isometrics to 100-deg flexion, x10 rep each way; R side-lying L shoulder abd PROM to 90-deg with place and hold 10\"x5; side-lying, L scapulothoracic rhythmic initiation PROM to AAROM to AROM x10 each and lightly resisted isometric hold to middle and lower trap positions; attempted light resistance to serratus, but pain noted so did not continue. Therapeutic Modalities: (15 Minutes): Cold/compression to L shoulder for post treatment soreness using Game Ready, low pressure, 40-deg F, x15' while seated. HEP: She is to continue with existing HEP. She verbalizes understanding. Treatment/Session Summary:    · Response to Treatment:  Continues with very good L shoulder PROM. PROM is in functional limits with empty end feels. Not trying to move to end ranges at this time. Good muscle activation with place and hold moves. Good soreness relief with the icing. .   · Communication/Consultation:  None today  · Equipment provided today:  None today  · Recommendations/Intent for next treatment session: Continue with shoulder motion treatment, low level muscle activation to deltoid and scapular stabilizers, and modalities as needed for pain.     Total Treatment Billable Duration: 55 minutes  PT Patient Time In/Time Out  Time In: 1131  Time Out: Yazmin Alexiss 106, PT    Future Appointments   Date Time Provider Asuncion Mccormick   2/27/2020  1:45 PM Néstor Jorge PT [facility]   3/2/2020  2:30 PM DAMEON Baca [facility]   3/5/2020  2:30 PM DAMEON Baca [facility]   3/9/2020  2:30 PM DAMEON Baca [facility]   3/12/2020  2:30 PM Néstor Jorge PT [facility]

## 2020-02-27 ENCOUNTER — HOSPITAL ENCOUNTER (OUTPATIENT)
Dept: PHYSICAL THERAPY | Age: 60
Discharge: HOME | End: 2020-02-27
Payer: COMMERCIAL

## 2020-02-27 PROCEDURE — 97110 THERAPEUTIC EXERCISES: CPT

## 2020-02-27 NOTE — PROGRESS NOTES
Le Hough  : 1960  Payor: EBER [facility], [address] [place] [address] / Plan: [place] PLANNED Lamont Fisher. / Product Type: Commerical /  [address]  at [facility] DULCE ANGUIANO  1101 [facility], [address], [place] [address].  [phone]   [phone]       OUTPATIENT PHYSICAL THERAPY: Daily Treatment Note 2020  Visit Count: 3     ICD-10: Treatment Diagnosis: Pain in left shoulder (M25.512); Presence of left artificial shoulder joint (J56.605)  Precautions/Allergies: Post op precautions. From EMR: Codeine   TREATMENT PLAN:  Effective Dates: 2020 TO 2020 (90 days). Frequency/Duration: 2 times a week for 90 Day(s) MEDICAL/REFERRING DIAGNOSIS:s/p L shoulder I&D, removal of [place] implant, revision reverse TSA, biceps tendonesis, lat dorsi and teres major tendon transfer  L shoulder   DATE OF ONSET: 20 surgery  REFERRING PHYSICIAN: Andrei Mcguire MD MD Orders: Eval and Treat; HEP; ROM; \"PROM, AAROM\" (20)  Return MD Appointment: 3/4/20       Pre-treatment Symptoms/Complaints: Says she is doing \"pretty good\". No issues with her HEP right now. Pain is under control. No more than mild soreness increase after last session. Pain: Initial:   5/10 Post Session:  Less after icing. Medications Last Reviewed: 20    Updated Objective Findings:   No new measure. TREATMENT:     Therapeutic Exercise: (40 Minutes): Motion exercise to L shoulder with controlled PROM and AAROM x10 each in supine to ER at 30, 45, and 60-80 degrees abduction, IR short arc with glenohumeral joint stabilized at 45 degrees abduction, abduction with scapula stabilized, and forward elevation; PROM to elbow extension x10 and assisted active isolated stretch to wrist flexors and extensors, 3\"x10 each.    Low level muscle activation to L shoulder/deltoid with place and hold in supine multi-angle ER and IR at 30 and 45 deg abd and short arc eccentric AROM x5 each position; place and hold to alternating isometrics at 100-deg flexion and at 90 deg abd in side-lying. Added supine AROM forward elevation with respiration 2x5; side-lying abduction to 90 3x5; side-lying middle and lower trap manual guided scapular add x10 and manual resisted scap add x10, then with UE lift short arc x10 each. In standing, added wall side forward elevation AAROM. And added deltoid isometrics at wall for muscle activation to be done with HEP. Good return demonstration and understanding. Therapeutic Modalities: (15 Minutes): Cold/compression to L shoulder for post treatment soreness using Game Ready, low pressure, 40-deg F, x15' while seated. HEP: She is to continue with existing HEP. She is to add the deltoid isometrics as instructed today. She can do comfortable active forward elevation in supine. She verbalizes understanding. Treatment/Session Summary:    · Response to Treatment:  Continues with very good L shoulder PROM. Good muscle activation with the exercises done today. No significant pain reported. Able to raise arm instanding past 90-deg now. · Communication/Consultation:  None today  · Equipment provided today:  None today  · Recommendations/Intent for next treatment session: Continue with controlled shoulder motion treatment, low level muscle activation to deltoid and scapular stabilizers, and modalities as needed for pain.     Total Treatment Billable Duration: 55 minutes  PT Patient Time In/Time Out  Time In: 1350  Time Out: DAMEON Stern    Future Appointments   Date Time Provider Asuncion Mccormick   3/2/2020  2:30 PM Kentrell Abbott PT [facility]   3/5/2020  2:30 PM DAMEON Sweeney [facility]   3/9/2020  2:30 PM DAMEON Sweeney [facility]   3/12/2020  2:30 PM DAMEON Sweeney [facility]

## 2020-02-28 LAB
BACTERIA SPEC CULT: NORMAL
BACTERIA SPEC CULT: NORMAL
SERVICE CMNT-IMP: NORMAL
SERVICE CMNT-IMP: NORMAL

## 2020-03-02 ENCOUNTER — HOSPITAL ENCOUNTER (OUTPATIENT)
Dept: PHYSICAL THERAPY | Age: 60
Discharge: HOME | End: 2020-03-02
Payer: COMMERCIAL

## 2020-03-02 PROCEDURE — 97110 THERAPEUTIC EXERCISES: CPT

## 2020-03-02 NOTE — PROGRESS NOTES
Smita العراقي  : 1960  Payor: [employer], [address] [place] [address] / Plan: [place] [employer] / Product Type: Commerical /  [address]  at [facility] DULCE ANGUIANO  [address] [place] [address], [address], [address] [place] [address]  [phone]   [phone]       OUTPATIENT PHYSICAL THERAPY: Daily Treatment Note 3/2/2020  Visit Count: 4     ICD-10: Treatment Diagnosis: Pain in left shoulder (M25.512); Presence of left artificial shoulder joint (L51.295)  Precautions/Allergies: Post op precautions. From EMR: Codeine   TREATMENT PLAN:  Effective Dates: 2020 TO 2020 (90 days). Frequency/Duration: 2 times a week for 90 Day(s) MEDICAL/REFERRING DIAGNOSIS:s/p L shoulder I&D, removal of [place] implant, revision reverse TSA, biceps tendonesis, lat dorsi and teres major tendon transfer  L shoulder   DATE OF ONSET: 20 surgery  REFERRING PHYSICIAN: Leander Mcguire MD MD Orders: Eval and Treat; HEP; ROM; \"PROM, AAROM\" (20)  Return MD Appointment: 3/4/20       Pre-treatment Symptoms/Complaints: Says she was very sore the next 2 days after last time. Soreness to the shoulder and to the back of the arm. Continued to work on her HEP. Tried the new wall isometrics without issue. Pain: Initial:   5/10 Post Session:   Medications Last Reviewed: 20    Updated Objective Findings:   No new measure. TREATMENT:     Therapeutic Exercise: (35 Minutes): Exercises for motion with pulleys to scaption 3x10; supine wand press 2x10 and flexion full arc 3x10; and added supine wand ER/IR in controlled ar at 30 and 45 deg abd x30 total each. PROM x10 each to ER at 30, 45, and 60-80 degrees abduction, IR short arc with glenohumeral joint stabilized at 45 degrees abduction, abduction with scapula stabilized, and forward elevation.   Low level muscle activation to L shoulder/deltoid with place and hold in supine multi-angle ER and IR at 30 and 45 deg abd and short arc eccentric AROM x5 each position; place and hold to alternating isometrics at 100-deg flexion and at 90 deg abd in side-lying. Guided AROM L shoulder in R side-lying to middle and lower trap manual resisted scap add+UE lift 2x10 each;side-lying abd to 90-deg 2x10; side-lying ER stabilization with place/hold and active eccentrics 3x5; supine serratus push with manual resistance x10; and standing forward elevation with active eccentrics x10. Reviewed wall isometrics. Verbalizes good understanding. HEP: She is to continue with existing HEP. She verbalizes understanding. Treatment/Session Summary:    · Response to Treatment:  Continues with very good L shoulder PROM. Good muscle activation with the exercises today. No significant pain reported during the session. She did have an increase in soreness after last session with the muscle activation exercises. She is nearly 4 weeks post op. · Communication/Consultation:  None today  · Equipment provided today:  None today  · Recommendations/Intent for next treatment session: Continue with controlled shoulder motion treatment, low level muscle activation to deltoid and scapular stabilizers, and modalities as needed for pain. She has a follow up with Dr. Masood Balderrama on Wednesday.      Total Treatment Billable Duration: 35 minutes  PT Patient Time In/Time Out  Time In: 6037  Time Out: 6 Pamela Wyatt PT    Future Appointments   Date Time Provider Asuncion Mccormick   3/5/2020  2:30 PM Erik Alexis PT [facility]   3/9/2020  2:30 PM DAMEON Lee [facility]   3/12/2020  2:30 PM DAMEON Lee [facility]

## 2020-03-05 ENCOUNTER — HOSPITAL ENCOUNTER (OUTPATIENT)
Dept: PHYSICAL THERAPY | Age: 60
Discharge: HOME | End: 2020-03-05
Payer: COMMERCIAL

## 2020-03-05 PROCEDURE — 97110 THERAPEUTIC EXERCISES: CPT

## 2020-03-05 NOTE — PROGRESS NOTES
Mary Valero  : 1960  Payor: PLANNED [facility], [address] [place] [address] / [place] Saritha Other. / Product Type: Commerical /  [address]  at [facility] DULCE ANGUIANO  [address] [facility], [address], [place] [address].  [phone]   [phone]       OUTPATIENT PHYSICAL THERAPY: Daily Treatment Note 3/5/2020  Visit Count: 5     ICD-10: Treatment Diagnosis: Pain in left shoulder (M25.512); Presence of left artificial shoulder joint (S94.621)  Precautions/Allergies: Post op precautions. From EMR: Codeine   TREATMENT PLAN:  Effective Dates: 2020 TO 2020 (90 days). Frequency/Duration: 2 times a week for 90 Day(s) MEDICAL/REFERRING DIAGNOSIS:s/p L shoulder I&D, removal of [place] implant, revision reverse TSA, biceps tendonesis, lat dorsi and teres major tendon transfer  L shoulder   DATE OF ONSET: 20 surgery  REFERRING PHYSICIAN: Chucky Mcguire MD MD Orders: \"Continue PT\" (3/3/20)  Return MD Appointment: 3/17/20       Reports having her follow up with Dr. Maurice Ovalle on Tuesday. X-rays done. He is pleased with her progress. She was provided a new order to continue PT and return again in 2 weeks. Pre-treatment Symptoms/Complaints: Says she is doing well. Not too sore after last time. Has been doing her HEP and using her arm a little with ADL's. Not able to reach up to wash or fix her hair yet. Pain: Initial:   no VAS Post Session:   Medications Last Reviewed: no change. 3/5/20    Updated Objective Findings:   No new measure. TREATMENT:     Therapeutic Exercise: (45 Minutes): Exercises for L shoulder motion with PROM x10 each to ER at 30, 45, and 80 degrees abduction, IR short arc with glenohumeral joint stabilized at 30 and 45 degrees abduction, abduction with scapula stabilized, and forward elevation. Exercises for muscle activation to L shoulder/deltoid with multi-angle place and hold in supine ER and IR at 30, 45, and 80 deg abd, and forward elevation. Alternating isometrics progressing to light rhythmic stab at 0-deg ER and 100-deg flexion in supine, and 90-deg abd in side-lying, x3 bouts each. Guided AROM L shoulder in R side-lying to middle and lower trap manual resisted scap add+UE lift 2x10 each; side-lying abd to 90-deg 2x10; side-lying ER stabilization with place/hold and active eccentrics 3x5; supine serratus push with manual resistance 2x10; and standing forward elevation with UE [place] with emphasis on eccentrics 2x10. Added standing red flex bar active rhythmic stabilization in standing neutral shoulder, 3x20\" bouts. Verbalizes good understanding. HEP: She is to continue with existing HEP. She verbalizes understanding. Treatment/Session Summary:    · Response to Treatment:  Continues with very good L shoulder PROM. Improving shoulder girdle muscle activation with the exercises today. No significant pain reports with these today. · Communication/Consultation:  None today  · Equipment provided today:  None today  · Recommendations/Intent for next treatment session: Continue with controlled shoulder motion treatment, low level muscle activation to deltoid and scapular stabilizers, and modalities as needed for pain.      Total Treatment Billable Duration: 45 minutes  PT Patient Time In/Time Out  Time In: 1430  Time Out: [place] 10, PT    Future Appointments   Date Time Provider Asuncion Mccormick   3/9/2020  2:30 PM Amairani Rose, PT [facility]   3/12/2020  2:30 PM Amairani Rose PT

## 2020-03-09 ENCOUNTER — HOSPITAL ENCOUNTER (OUTPATIENT)
Dept: PHYSICAL THERAPY | Age: 60
Discharge: HOME | End: 2020-03-09
Payer: COMMERCIAL

## 2020-03-09 PROCEDURE — 97110 THERAPEUTIC EXERCISES: CPT

## 2020-03-09 NOTE — PROGRESS NOTES
Bronson Winslow  : 1960  Payor: [employer], [address] [place] [address] / [place] [employer] / Product Type: Commerical /  [address] [place]  at [facility] DULCE ANGUIANO  [address] [facility], [address], [place] [address].  [phone]   [phone]       OUTPATIENT PHYSICAL THERAPY: Daily Treatment Note 3/9/2020  Visit Count: 6     ICD-10: Treatment Diagnosis: Pain in left shoulder (M25.512); Presence of left artificial shoulder joint (F76.807)  Precautions/Allergies: Post op precautions. From EMR: Codeine   TREATMENT PLAN:  Effective Dates: 2020 TO 2020 (90 days). Frequency/Duration: 2 times a week for 90 Day(s) MEDICAL/REFERRING DIAGNOSIS:s/p L shoulder I&D, removal of [place] implant, revision reverse TSA, biceps tendonesis, lat dorsi and teres major tendon transfer  L shoulder   DATE OF ONSET: 20 surgery  REFERRING PHYSICIAN: Christopher Mcguire MD MD Orders: \"Continue PT\" (3/3/20)  Return MD Appointment: 3/17/20       Pre-treatment Symptoms/Complaints: Says she is doing well. Not significant soreness to report. Has been doing her HEP. No issues with the progressed scapular exercises to do at home. Pain: Initial:   no VAS Post Session:   Medications Last Reviewed: no change. 3/9/20    Updated Objective Findings:   No new measure. TREATMENT:     Therapeutic Exercise: (40 Minutes): Exercises for L shoulder motion with PROM x10 each to ER at 30, 45, and 80 degrees abduction, IR short arc with glenohumeral joint stabilized at 30 and 45 degrees abduction, abduction with scapula stabilized, and forward elevation. Exercises for muscle activation to L shoulder/deltoid with multi-angle place and hold in supine ER and IR at 30, 45, and 80 deg abd, and forward elevation. Alternating isometrics progressing to light rhythmic stab at 0-deg ER and 100-deg flexion in supine, and 90-deg abd in side-lying, x3 bouts each.  Then progressed to holding 500-g ball with each x1 bout.   Guided AROM L shoulder in R side-lying to middle and lower trap manual resisted scap add+UE lift 1x15 each with progression of hold time; side-lying abd to 90-deg 1x15; and standing forward elevation AARO 1x10. Standing red flex bar active rhythmic stabilization in standing neutral shoulder, 3x20\" bouts. Verbalizes good understanding. HEP: She is to continue with existing HEP. She verbalizes understanding. Treatment/Session Summary:    · Response to Treatment:  Continues with very good L shoulder PROM. Good effort with the muscle activation and active stabilization of the shoulder today. This is improving. · Communication/Consultation:  None today  · Equipment provided today:  None today  · Recommendations/Intent for next treatment session: Continue with controlled shoulder motion treatment, low level muscle activation to deltoid and scapular stabilizers, and modalities as needed for pain.      Total Treatment Billable Duration: 40 minutes  PT Patient Time In/Time Out  Time In: 1440  Time Out: [address] [facility] , PT    Future Appointments   Date Time Provider Asuncion Mccormick   3/12/2020  2:30 PM Derick Garibay, PT [facility]

## 2020-03-12 ENCOUNTER — HOSPITAL ENCOUNTER (OUTPATIENT)
Dept: PHYSICAL THERAPY | Age: 60
Discharge: HOME | End: 2020-03-12
Payer: COMMERCIAL

## 2020-03-12 PROCEDURE — 97110 THERAPEUTIC EXERCISES: CPT

## 2020-03-12 NOTE — PROGRESS NOTES
Deirdre Araujo  : 1960  [place]: [place] [employer]*  Secondary:  [address]  at [facility] DULCE ANGUIANO  1101 [facility], [address] [place] [address],[address], [address] [facility]  [phone]   [phone]       OUTPATIENT PHYSICAL THERAPY:MD Communication 3/12/2020     ICD-10: Treatment Diagnosis: Pain in left shoulder (M25.512); Presence of left artificial shoulder joint (O16.343)  Precautions/Allergies: Post op precautions. From EMR: Codeine   TREATMENT PLAN:  Effective Dates: 2020 TO 2020 (90 days). Frequency/Duration: 2 times a week for 90 Day(s) MEDICAL/REFERRING DIAGNOSIS:s/p L shoulder I&D, removal of [place] implant, revision reverse TSA, biceps tendonesis, lat dorsi and teres major tendon transfer  L shoulder   DATE OF ONSET: 20 surgery  REFERRING PHYSICIAN: Cox, Anise Crigler, MD MD Orders: Eval and Treat; HEP; ROM; \"PROM, AAROM\" (20)  Return MD Appointment: 3/4/20     PROGRESS ASSESSMENT:  Ms. Del Mccartney has attended 7 visits to date. She is 5 weeks s/p revision reverse L total shoulder arthroplasty with a long stem Delta Xtend prosthesis, biceps tenodesis, latissimus dorsi and teres major tendon transfer. She appears to be doing very well at this point. She does have pain to the shoulder, but she reports it is manageable. She has very good shoulder PROM for 2 weeks post op, and she is able to activate deltoid with short arc AROM elevation. Post-op pain, weakness and decreased ROM are limiting normalized use and function of her L/non-dominant UE. She will benefit from PT for guided post-op shoulder rehab to promote safe return to normalized use of the UE with her normal ADL and work activities. This is a revision procedure, and her original injury was in May 2018, so anticipate a slower rehab process.       Updated Objective Findings:   ROM:   LUE AROM  L Shoulder Flexion: 110(forward elevation)  LUE PROM  L Shoulder Flexion: 150(forward elevation)         RECOMMENDATION: We will plan to continue with her existing treatment plan and progress as ordered.      Maykel Alfaro, PT, MSPT, OCS

## 2020-03-12 NOTE — PROGRESS NOTES
Mary Valero  : 1960  Payor: PLANNED [facility], [address] [place] [address] / [place] Saritha Other. / Product Type: Commerical /  [address] [place]  at [address] [place] [address] Rd  1101 [facility], [address], [address] [facility] [place]  [phone]   [phone]       OUTPATIENT PHYSICAL THERAPY: Daily Treatment Note 3/12/2020  Visit Count: 7     ICD-10: Treatment Diagnosis: Pain in left shoulder (M25.512); Presence of left artificial shoulder joint (G12.754)  Precautions/Allergies: Post op precautions. From EMR: Codeine   TREATMENT PLAN:  Effective Dates: 2020 TO 2020 (90 days). Frequency/Duration: 2 times a week for 90 Day(s) MEDICAL/REFERRING DIAGNOSIS:s/p L shoulder I&D, removal of [place] implant, revision reverse TSA, biceps tendonesis, lat dorsi and teres major tendon transfer  L shoulder   DATE OF ONSET: 20 surgery  REFERRING PHYSICIAN: Chucky Mcguire MD MD Orders: \"Continue PT\" (3/3/20)  Return MD Appointment: 3/17/20       Pre-treatment Symptoms/Complaints: Continues to do well. No significant soreness to report. Has been doing her HEP and using her L arm a little more with ADL's. Pain: Initial:   no VAS Post Session:   Medications Last Reviewed: no change.  3/9/20    Updated Objective Findings:   ROM:   LUE AROM  L Shoulder Flexion: 110(forward elevation)  LUE PROM  L Shoulder Flexion: 150(forward elevation)                  TREATMENT:     Therapeutic Exercise: (45 Minutes): Exercises for L shoulder motion and muscle activation with pulleys x30; supine AROM forward elevation with respiration 3x5; alternating isometrics progressing to light rhythmic stab 0-deg ER and 100-deg flexion in supine, and 90-deg abd in side-lying; added light manual resisted AROM in controlled ranges to supine forward elevation in short arc, supine horizontal abd in short arc, supine serratus press, side-lying abd, side-lying horiz abd/middle trap, and side-lying scaption lift/lower trap, 3x10 each; standing forearm wall slide 3x5; and UE [place] to forward elevation with emphasis on lower trap activation and humeral eccentric control 2x10. Added serratus wall push in standing at wall with 5-10\" holds x5. Manual guiding and emphasis on scapulohumeral rhythm. HEP: She is to continue with existing HEP. She verbalizes understanding. Treatment/Session Summary:    · Response to Treatment: She is 5 weeks post op. Continues with very good L shoulder PROM. Good muscle activation. Good effort with the exercises today. Fatigue noted at end of session. · Communication/Consultation:  None today  · Equipment provided today:  None today  · Recommendations/Intent for next treatment session: Continue with controlled shoulder motion treatment, low level muscle activation to deltoid and scapular stabilizers, and modalities as needed for pain. She returns to Dr. Karthik Garcia for follow up on Monday.      Total Treatment Billable Duration: 45 minutes  PT Patient Time In/Time Out  Time In: 3348  Time Out: [facility], PT    Future Appointments   Date Time Provider Asuncion Mccormick   3/17/2020  3:45 PM Michelle Quesada, PT [facility]   3/19/2020  4:00 PM Michelle Gacollina, PT [facility]   3/23/2020  3:15 PM Michelle Gacollina, PT [facility]   3/26/2020  1:00 PM Michelle Gacollina, PT [facility]   3/30/2020  3:15 PM Roman Steve, PT [facility]   4/2/2020  1:45 PM [place] Steve, PT [facility]   4/6/2020  1:45 PM Michelle Gacollina, PT [facility]   4/9/2020  1:00 PM Michelle Gacollina, PT [facility]

## 2020-03-16 LAB
BACTERIA SPEC CULT: NORMAL
SERVICE CMNT-IMP: NORMAL

## 2020-03-17 ENCOUNTER — HOSPITAL ENCOUNTER (OUTPATIENT)
Dept: PHYSICAL THERAPY | Age: 60
Discharge: HOME | End: 2020-03-17
Payer: COMMERCIAL

## 2020-03-17 PROCEDURE — 97110 THERAPEUTIC EXERCISES: CPT

## 2020-03-17 NOTE — PROGRESS NOTES
Anastasia Villarreal  : 1960  Payor: [employer], [address] [place] [address] / [place] [employer] / Product Type: Commerical /  [address] [place]  at [facility] DULCE ANGUIANO  1101 [facility], [address], [place] [address].  [phone]   [phone]       OUTPATIENT PHYSICAL THERAPY: Daily Treatment Note 3/17/2020  Visit Count: 8     ICD-10: Treatment Diagnosis: Pain in left shoulder (M25.512); Presence of left artificial shoulder joint (Y86.844)  Precautions/Allergies: Post op precautions. From EMR: Codeine   TREATMENT PLAN:  Effective Dates: 2020 TO 2020 (90 days). Frequency/Duration: 2 times a week for 90 Day(s) MEDICAL/REFERRING DIAGNOSIS:s/p L shoulder I&D, removal of [place] implant, revision reverse TSA, biceps tendonesis, lat dorsi and teres major tendon transfer  L shoulder   DATE OF ONSET: 20 surgery  REFERRING PHYSICIAN: Girma Mcguire MD MD Orders: \"Continue PT\" (3/3/20)  Return MD Appointment: uncertain. Pre-treatment Symptoms/Complaints: states she is doing well and is pleased with how she is doing. Saw Dr Rubens Calle and has new order but forgot to bring it in. Pain: Initial:   no VAS Post Session: no complaints of pain   Medications Last Reviewed: no change.  3/17/20    Updated Objective Findings:   ROM:                      TREATMENT:     Therapeutic Exercise: (45 Minutes): Exercises for L shoulder motion and muscle activation with pulleys x30; supine AROM forward elevation 2 x 10;  90-deg abd in side-lying; added light manual resisted AROM in controlled ranges to supine forward elevation in short arc, supine horizontal abd in short arc, supine serratus press with 2#, side-lying abd, side-lying horiz abd/middle trap, and side-lying scaption lift/lower trap, 3x10 each; standing forearm wall slide 3x5; and UE [place] to forward elevation with emphasis on lower trap activation and humeral eccentric control 2x10. performed serratus wall push in standing at wall with 5-10\" holds x5. Manual guiding and emphasis on scapulohumeral rhythm. Performed standing cervical ROM and shoulder rolls,  Cervical retraction with extension x10  HEP: She is to continue with existing HEP. She verbalizes understanding. Treatment/Session Summary:    · Response to Treatment: Continues to do well. Had improved active elevation after cervical exercises. Fatigued after exercises but no complaints. · Communication/Consultation:  None today  · Equipment provided today:  None today  · Recommendations/Intent for next treatment session: Continue with controlled shoulder motion treatment, low level muscle activation to deltoid and scapular stabilizers, and modalities as needed for pain.      Total Treatment Billable Duration: 45 minutes       Yvonne Fields PT    Future Appointments   Date Time Provider Asuncion Mccormick   3/19/2020  4:00 PM Jennyfer Lombardo, PT [facility]   3/23/2020  3:15 PM Jennyfer Lombardo, PT [facility]   3/26/2020  1:00 PM Jennyfer Lombardo, PT [facility]   3/30/2020  3:15 PM Chey Au, PT [facility]   4/2/2020  1:45 PM Chey Au, PT [facility]   4/6/2020  1:45 PM Jennyfer Lombardo, PT [facility]   4/9/2020  1:00 PM Jennyfer Lombardo, PT [facility]

## 2020-03-19 ENCOUNTER — APPOINTMENT (OUTPATIENT)
Dept: PHYSICAL THERAPY | Age: 60
End: 2020-03-19
Payer: COMMERCIAL

## 2020-03-23 ENCOUNTER — HOSPITAL ENCOUNTER (OUTPATIENT)
Dept: PHYSICAL THERAPY | Age: 60
Discharge: HOME | End: 2020-03-23
Payer: COMMERCIAL

## 2020-03-23 ENCOUNTER — APPOINTMENT (OUTPATIENT)
Dept: PHYSICAL THERAPY | Age: 60
End: 2020-03-23
Payer: COMMERCIAL

## 2020-03-23 PROCEDURE — 97110 THERAPEUTIC EXERCISES: CPT

## 2020-03-23 NOTE — PROGRESS NOTES
Shefali Lee  : 1960  Payor: PLANNED [facility], [address] [place] [address] / [place] Sallie Traylor. / Product Type: Commerical /  [address] [place]  at [facility] DULCE ANGUIANO  1101 [facility], [address], [place] [address].  [phone]   [phone]       OUTPATIENT PHYSICAL THERAPY: Daily Treatment Note 3/23/2020  Visit Count: 9     ICD-10: Treatment Diagnosis: Pain in left shoulder (M25.512); Presence of left artificial shoulder joint (M77.242)  Precautions/Allergies: Post op precautions. From EMR: Codeine   TREATMENT PLAN:  Effective Dates: 2020 TO 2020 (90 days). Frequency/Duration: 2 times a week for 90 Day(s) MEDICAL/REFERRING DIAGNOSIS:s/p L shoulder I&D, removal of [place] implant, revision reverse TSA, biceps tendonesis, lat dorsi and teres major tendon transfer  L shoulder   DATE OF ONSET: 20 surgery  REFERRING PHYSICIAN: Kyra Mcguire MD MD Orders: \"Continue PT\" (3/3/20)  Return MD Appointment: uncertain. Pre-treatment Symptoms/Complaints: Reports that her shoulder has been feeling pretty well. Pain: Initial:   no VAS Post Session: no complaints of pain   Medications Last Reviewed: no change. 3/17/20    Updated Objective Findings:   ROM:    n/a                  TREATMENT:     Therapeutic Exercise: (40 Minutes) to improve L shoulder strength and function. Verbal cues and demonstration as needed.      Date:  3/23/20 Date:   Date:     Activity/Exercise Parameters Parameters Parameters   Pulleys X 30      AROM Supine x 20    Side-lying ABD to 90 deg  3 x 10     Supine  3 x 10 0#    Flexion, standing to 90 deg, 3 x 10      Serratus press 2# 3 x 10      Middle trapezius Side-lying       Lower trapezius Side-lying     Scapular retractions Prone rows  3# 3 x 12    Prone extensions  3 x 12 0#    Cable rows  7# 3 x 12 R     UE [place] 3  x10          HEP: Verbalized the addition of side-lying shoulder abductions, and supine presses, with patient verbalizing understanding. Treatment/Session Summary:    · Response to Treatment: Patient did very well with majority of exercises, but does have some control deficits with supine presses and UE r[place]. · Communication/Consultation:  None today  · Equipment provided today:  None today  · Recommendations/Intent for next treatment session: Continue with controlled shoulder motion treatment, low level muscle activation to deltoid and scapular stabilizers, and modalities as needed for pain.      Total Treatment Billable Duration: 40 minutes  PT Patient Time In/Time Out  Time In: 0830  Time Out: Mikael Vela, PT    Future Appointments   Date Time Provider Asuncion Mccormick   3/26/2020  8:30 AM Wayne Ortiz, PT [facility]   4/2/2020  1:45 PM Marlena Burroughs, PT [facility]   4/6/2020  1:45 PM Thelma Cueto, [facility]   4/9/2020  1:00 PM Thelma Cueto, PT [facility]

## 2020-03-26 ENCOUNTER — HOSPITAL ENCOUNTER (OUTPATIENT)
Dept: PHYSICAL THERAPY | Age: 60
Discharge: HOME | End: 2020-03-26
Payer: COMMERCIAL

## 2020-03-26 ENCOUNTER — APPOINTMENT (OUTPATIENT)
Dept: PHYSICAL THERAPY | Age: 60
End: 2020-03-26
Payer: COMMERCIAL

## 2020-03-26 PROCEDURE — 97110 THERAPEUTIC EXERCISES: CPT

## 2020-03-26 NOTE — PROGRESS NOTES
Smita العراقي  : 1960  Payor: PLANNED [facility], [facility] / Plan: [place] PLANNED [place] [employer]. / Product Type: Commerical /  [address] [place]  at [facility] DULCE ANGUIANO  [address] [facility], [address], [place] [address].  [phone]   [phone]       OUTPATIENT PHYSICAL THERAPY: Daily Treatment Note 3/26/2020  Visit Count: 10     ICD-10: Treatment Diagnosis: Pain in left shoulder (M25.512); Presence of left artificial shoulder joint (G71.698)  Precautions/Allergies: Post op precautions. From EMR: Codeine   TREATMENT PLAN:  Effective Dates: 2020 TO 2020 (90 days). Frequency/Duration: 2 times a week for 90 Day(s) MEDICAL/REFERRING DIAGNOSIS:s/p L shoulder I&D, removal of [place] implant, revision reverse TSA, biceps tendonesis, lat dorsi and teres major tendon transfer  L shoulder   DATE OF ONSET: 20 surgery  REFERRING PHYSICIAN: Leander Mcguire MD MD Orders: \"Continue PT\" (3/3/20)  Return MD Appointment:        Smita العراقي was cleared by the screener to enter the clinic this morning. Pre-treatment Symptoms/Complaints: Shoulder has been doing well. Soreness only when she does too much activity. Has been working her HEP. Not able to reach the top of her head yet for washing her hair, and still needing to prop her arm to fix her hair. Using it with household and self care activities. Pain: Initial:   no VAS Post Session: no complaints of pain   Medications Last Reviewed: 3/26/20    Updated Objective Findings:   No new measure. TREATMENT:     Therapeutic Exercise: (45 Minutes): Exercises per grid for shoulder girdle early strengthening. Exercises modified as indicated. Manual guiding and verbal cues and demonstration as needed. Good return demonstration with training. HEP:  Verbal instruction to continue active HEP and for new/modified exercises done today. Provided yellow tubing for HEP as done today with humeral elevation moves.  She verbalizing understanding. Date:  3/23/20 Date:  3/26/20 Date:     Activity/Exercise Parameters Parameters Parameters   UBE  L. 2 x6'    Pulleys X 30  3 ways x20 ea    AROM Supine x 20    Side-lying ABD to 90 deg  3 x 10     Supine  3 x 10 0#    Flexion, standing to 90 deg, 3 x 10  Standing wand ext  x15    Serratus press 2# 3 x 10  Supine  Yellow pull apart  2X10; Inclined 30-45 deg, bilat with yellow pull apart  1x10    Shoulder Ext  Prone unilat  1x10  1# 1x10    Middle trapezius Side-lying   Prone unilat  1x10  1# 1x10    Lower trapezius Side-lying Prone unilat  1x10  1# 1x10    Scapular retractions Prone rows  3# 3 x 12    Prone extensions  3 x 12 0#    Cable rows  7# 3 x 12 R Bent over row, bilat with yellow band pull apart  1# 1x15;  Standing bilat row, red tubing w/ER stab  1x15    Shoulder ER  Side lying eccnetrics AAROM  2x10    Forward Elevation  Beach chair incline 30-45 deg 1x10;  Standing to 90-deg  x10  1# 2x10    Abduction  Standing to 90-deg  x10  1# 2x10    Diagonal Flexion  30-deg Incline  1x10 with manual guiding    Diagonal Extension  30-deg Incline  1x10 with manual guiding    UE [place] 3  x10  -      Treatment/Session Summary:    · Response to Treatment: Good effort with the exercises. Improving active forward elevation. Not able to reach top of head in standing yet. ER rotation control weakness is limiter on active over head. Doing well with strengthening progression. · Communication/Consultation:  None today  · Equipment provided today:  None today  · Recommendations/Intent for next treatment session: we will continue with controlled shoulder re-strengthening.      Total Treatment Billable Duration: 45 minutes  PT Patient Time In/Time Out  Time In: 1000  Time Out: [facility], PT    Future Appointments   Date Time Provider Asuncion Mccormick   3/30/2020  9:45 AM Kaela Echeverria., PT [facility]   4/2/2020  1:45 PM [facility]   4/6/2020 1:45 PM DAMEON Mcelroy   4/9/2020  1:00 PM DAMEON Mcelroy

## 2020-03-30 ENCOUNTER — APPOINTMENT (OUTPATIENT)
Dept: PHYSICAL THERAPY | Age: 60
End: 2020-03-30
Payer: COMMERCIAL

## 2020-03-30 ENCOUNTER — HOSPITAL ENCOUNTER (OUTPATIENT)
Dept: PHYSICAL THERAPY | Age: 60
Discharge: HOME | End: 2020-03-30
Payer: COMMERCIAL

## 2020-03-30 PROCEDURE — 97110 THERAPEUTIC EXERCISES: CPT

## 2020-03-30 NOTE — PROGRESS NOTES
Agata Burrows  : 1960  Payor: EBER [facility], [address] [place] [address] / Plan: [place] PLANNED Shannon Castillo. / Product Type: Commerical /  [address]  at [facility] DULCE ANGUIANO  [address] [facility], [address], [place] [address].  [phone]   [phone]       OUTPATIENT PHYSICAL THERAPY: Daily Treatment Note 3/30/2020  Visit Count: 11     ICD-10: Treatment Diagnosis: Pain in left shoulder (M25.512); Presence of left artificial shoulder joint (D85.650)  Precautions/Allergies: Post op precautions. From EMR: Codeine   TREATMENT PLAN:  Effective Dates: 2020 TO 2020 (90 days). Frequency/Duration: 2 times a week for 90 Day(s) MEDICAL/REFERRING DIAGNOSIS:s/p L shoulder I&D, removal of [place] implant, revision reverse TSA, biceps tendonesis, lat dorsi and teres major tendon transfer  L shoulder   DATE OF ONSET: 20 surgery  REFERRING PHYSICIAN: Dipti Mcguire MD MD Orders: \"Continue PT\" (3/3/20)  Return MD Appointment:        Agata Burrows was cleared by the screener to enter the clinic this morning. Pre-treatment Symptoms/Complaints: Shoulder is a little tired. Used it over the weekend doing yardwork and is fatigued. Pain: Initial:   no VAS Post Session: no complaints of pain   Medications Last Reviewed: 3/26/20    Updated Objective Findings:   No new measure. TREATMENT:     Therapeutic Exercise: (40 Minutes): Exercises per grid for shoulder girdle early strengthening. Exercises modified as indicated. Manual guiding and verbal cues and demonstration as needed. HEP:  Verbal instruction to continue active HEP and for new/modified exercises done today.   .    Date:  3/23/20 Date:  3/26/20 Date:  3/30/20   Activity/Exercise Parameters Parameters Parameters   UBE  L. 2 x6'    Pulleys X 30  3 ways x20 ea 3 x 20 ea   AROM Supine x 20    Side-lying ABD to 90 deg  3 x 10     Supine  3 x 10 0#    Flexion, standing to 90 deg, 3 x 10  Standing wand ext  x15 Standing wand ext x 15    Wall push ups 3 x 10   Serratus press 2# 3 x 10  Supine  Yellow pull apart  2X10; Inclined 30-45 deg, bilat with yellow pull apart  1x10    Shoulder Ext  Prone unilat  1x10  1# 1x10 Prone  1# 3 x 10   Middle trapezius Side-lying   Prone unilat  1x10  1# 1x10 Prone  3x 10   Lower trapezius Side-lying Prone unilat  1x10  1# 1x10 Prone  3 x 8   Scapular retractions Prone rows  3# 3 x 12    Prone extensions  3 x 12 0#    Cable rows  7# 3 x 12 R Bent over row, bilat with yellow band pull apart  1# 1x15;  Standing bilat row, red tubing w/ER stab  1x15    Shoulder ER  Side lying eccnetrics AAROM  2x10    Forward Elevation  Beach chair incline 30-45 deg 1x10;  Standing to 90-deg  x10  1# 2x10 Beach chair incline, 30-45 degrees 3 x 10       Abduction  Standing to 90-deg  x10  1# 2x10 Standing to 90 deg  3 x 10   Diagonal Flexion  30-deg Incline  1x10 with manual guiding 30 deg-incline  3 x 10   Diagonal Extension  30-deg Incline  1x10 with manual guiding 30 deg -  Incline   3 x 10   UE [place] 3  x10  -    Internal rotation   Supine belly press w/ball, 3 x 10    Chest press bilateral, pressing ball 3 x 10 supine     Treatment/Session Summary:    · Response to Treatment: Weakness evident with belly press, as patient unable to keep hand on stomach with resistance and inability to keep elbow in when performing supine presses. Otherwise doing very well, especially with AROM. · Communication/Consultation:  None today  · Equipment provided today:  None today  · Recommendations/Intent for next treatment session: we will continue with controlled shoulder re-strengthening.      Total Treatment Billable Duration: 40 minutes  PT Patient Time In/Time Out  Time In: 1276  Time Out: [address] [facility], PT    Future Appointments   Date Time Provider Asuncion Mccormick   4/2/2020  1:45 PM Gen Esquivel, PT PATRICAI [facility]   4/6/2020  1:45 PM Kavitha Moreno PT TIANORPRAFAELA [facility]   4/9/2020  1:00 PM Lala Gallegos Joanie Boyle, PT SFORPTWD [facility]

## 2020-04-02 ENCOUNTER — APPOINTMENT (OUTPATIENT)
Dept: PHYSICAL THERAPY | Age: 60
End: 2020-04-02
Payer: COMMERCIAL

## 2020-04-02 ENCOUNTER — HOSPITAL ENCOUNTER (OUTPATIENT)
Dept: PHYSICAL THERAPY | Age: 60
Discharge: HOME | End: 2020-04-02
Payer: COMMERCIAL

## 2020-04-02 PROCEDURE — 97110 THERAPEUTIC EXERCISES: CPT

## 2020-04-02 NOTE — PROGRESS NOTES
Nawaf Coronado  : 1960  Payor: PLANNED [facility], [address] [place] [address] / Plan: [place] PLANNED Dee Rolling. / Product Type: Commerical /  [address]  at [facility] DULCE ANGUIANO  1101 [facility], [address], [place] [address].  [phone]   [phone]       OUTPATIENT PHYSICAL THERAPY: Daily Treatment Note 2020  Visit Count: 12     ICD-10: Treatment Diagnosis: Pain in left shoulder (M25.512); Presence of left artificial shoulder joint (K46.149)  Precautions/Allergies: Post op precautions. From EMR: Codeine   TREATMENT PLAN:  Effective Dates: 2020 TO 2020 (90 days). Frequency/Duration: 2 times a week for 90 Day(s) MEDICAL/REFERRING DIAGNOSIS:s/p L shoulder I&D, removal of [place] implant, revision reverse TSA, biceps tendonesis, lat dorsi and teres major tendon transfer  L shoulder   DATE OF ONSET: 20 surgery  REFERRING PHYSICIAN: Naveen Mcguire MD MD Orders: \"Continue PT\" (3/3/20)  Return MD Appointment:        Nawaf Coronado was cleared by the screener to enter the clinic this morning. Pre-treatment Symptoms/Complaints: She reports her shoulder is sore from refinishing her cabinets. Pain: Initial:   no VAS Post Session: no complaints of pain   Medications Last Reviewed: 3/26/20    Updated Objective Findings:   No new measure. TREATMENT:     Therapeutic Exercise: (38 Minutes): Exercises per grid for shoulder girdle early strengthening. Exercises modified as indicated. Manual guiding and verbal cues and demonstration as needed. HEP:  Verbal instruction to continue active HEP and for new/modified exercises done today.   .    Date:  3/23/20 Date:  3/26/20 Date:  3/30/20 date20   Activity/Exercise Parameters Parameters Parameters parameters   UBE  L. 2 x6'     Pulleys X 30  3 ways x20 ea 3 x 20 ea 3x20   AROM Supine x 20    Side-lying ABD to 90 deg  3 x 10     Supine  3 x 10 0#    Flexion, standing to 90 deg, 3 x 10  Standing wand ext  x15 Standing wand ext x 15    Wall push ups 3 x 10 Standing wand ext x15   Serratus press 2# 3 x 10  Supine  Yellow pull apart  2X10; Inclined 30-45 deg, bilat with yellow pull apart  1x10  Supine yellow pull apart supine 3x10; incline 2x10   Shoulder Ext  Prone unilat  1x10  1# 1x10 Prone  1# 3 x 10 Prone 1# 3x10   Middle trapezius Side-lying   Prone unilat  1x10  1# 1x10 Prone  3x 10 Prone 1# 3x10   Lower trapezius Side-lying Prone unilat  1x10  1# 1x10 Prone  3 x 8 -   Scapular retractions Prone rows  3# 3 x 12    Prone extensions  3 x 12 0#    Cable rows  7# 3 x 12 R Bent over row, bilat with yellow band pull apart  1# 1x15;  Standing bilat row, red tubing w/ER stab  1x15  Prone 1# 3x10   Shoulder ER  Side lying eccnetrics AAROM  2x10     Forward Elevation  Beach chair incline 30-45 deg 1x10;  Standing to 90-deg  x10  1# 2x10 Beach chair incline, 30-45 degrees 3 x 10     Beach press with ball squeeze 2x10; standing to 90 deg 1# 2x10   Abduction  Standing to 90-deg  x10  1# 2x10 Standing to 90 deg  3 x 10 Standing to 90 2x10  1# 2x10   Diagonal Flexion  30-deg Incline  1x10 with manual guiding 30 deg-incline  3 x 10 -   Diagonal Extension  30-deg Incline  1x10 with manual guiding 30 deg -  Incline   3 x 10 -   UE [place] 3  x10  -     Internal rotation   Supine belly press w/ball, 3 x 10    Chest press bilateral, pressing ball 3 x 10 supine Supine belly press 3x10,   sitting arm supported at 60 deg and IR yellow band 2x10     Treatment/Session Summary:    · Response to Treatment: She seems to be progressing well with UE strengthening. No complaints of pain with exercises. · Communication/Consultation:  None today  · Equipment provided today:  None today  · Recommendations/Intent for next treatment session: we will continue with controlled shoulder re-strengthening.      Total Treatment Billable Duration: 38 minutes  PT Patient Time In/Time Out  Time In: 0950  Time Out: [address] [place], PT    Future Appointments Date Time Provider Asuncion Mccormick   4/6/2020  9:45 AM [facility] PRN STAFF [facility]   4/9/2020  9:45 AM P[facility]WOOD PRN STAFF [facility]   4/13/2020  9:45 AM [facility]   4/16/2020  9:45 AM [facility]   4/20/2020  9:45 AM PATEWOOD PRN STAFF [facility]   4/23/2020  9:45 AM [facility]   4/27/2020  9:45 AM [facility]   4/30/2020  9:45 AM [facility]

## 2020-04-06 ENCOUNTER — APPOINTMENT (OUTPATIENT)
Dept: PHYSICAL THERAPY | Age: 60
End: 2020-04-06
Payer: COMMERCIAL

## 2020-04-06 ENCOUNTER — HOSPITAL ENCOUNTER (OUTPATIENT)
Dept: PHYSICAL THERAPY | Age: 60
Discharge: HOME | End: 2020-04-06
Payer: COMMERCIAL

## 2020-04-06 PROCEDURE — 97110 THERAPEUTIC EXERCISES: CPT

## 2020-04-06 NOTE — PROGRESS NOTES
Jimmy Wagner  : 1960  Payor: EBER [facility], [address] [place] [address] / Plan[place]. / Product Type: Commerical /  [address] [place]  at [facility] DULCE ANGUIANO  1101 [facility], [address], [address] [facility]  [phone]   [phone]       OUTPATIENT PHYSICAL THERAPY: Daily Treatment Note 2020  Visit Count: 13     ICD-10: Treatment Diagnosis: Pain in left shoulder (M25.512); Presence of left artificial shoulder joint (F88.523)  Precautions/Allergies: Post op precautions. From EMR: Codeine   TREATMENT PLAN:  Effective Dates: 2020 TO 2020 (90 days). Frequency/Duration: 2 times a week for 90 Day(s) MEDICAL/REFERRING DIAGNOSIS:s/p L shoulder I&D, removal of [place] implant, revision reverse TSA, biceps tendonesis, lat dorsi and teres major tendon transfer  L shoulder   DATE OF ONSET: 20 surgery  REFERRING PHYSICIAN: Allen Mcguire MD MD Orders: \"Continue PT\" (3/3/20)  Return MD Appointment: End        Jimmy Wagner was cleared by the screener to enter the clinic this morning. Pre-treatment Symptoms/Complaints: Continues to report that shoulder is doing very well. Can move it so much better than she could before surgery. Pain: Initial:   no VAS Post Session: no complaints of pain   Medications Last Reviewed: 3/26/20    Updated Objective Findings:   L shoulder AROM (standing)  Flexion: 140 degrees  ABD: 138 degrees  ER: able to reach back of head with compensation    Strength  Flex 4+/5  ABD 4+/5  IR (elbow at side) 4-/5  ER (elbow at side) 4-/5     TREATMENT:     Therapeutic Exercise: (40 Minutes): Exercises per grid for shoulder girdle early strengthening. Exercises modified as indicated. Manual guiding and verbal cues and demonstration as needed. Rhythmic stabilization to L shoulder with patient in supine at 90 degrees flexion    HEP:  Verbal instruction to continue active HEP and for new/modified exercises done today. Elrod Bound Date:  3/23/20 Date:  3/26/20 Date:  3/30/20 Date  4-2-20 Date  4/6/20   Activity/Exercise Parameters Parameters Parameters parameters    UBE  L. 2 x6'      Pulleys X 30  3 ways x20 ea 3 x 20 ea 3x20 3 x 20    AROM Supine x 20    Side-lying ABD to 90 deg  3 x 10     Supine  3 x 10 0#    Flexion, standing to 90 deg, 3 x 10  Standing wand ext  x15 Standing wand ext x 15    Wall push ups 3 x 10 Standing wand ext x15    Serratus press 2# 3 x 10  Supine  Yellow pull apart  2X10;   Inclined 30-45 deg, bilat with yellow pull apart  1x10  Supine yellow pull apart supine 3x10; incline 2x10    Shoulder Ext  Prone unilat  1x10  1# 1x10 Prone  1# 3 x 10 Prone 1# 3x10 Prone  1# 3 x 10   Middle trapezius Side-lying   Prone unilat  1x10  1# 1x10 Prone  3x 10 Prone 1# 3x10 -   Lower trapezius Side-lying Prone unilat  1x10  1# 1x10 Prone  3 x 8 - Prone  0# 1 x 10  1# 2 x 10   Scapular retractions Prone rows  3# 3 x 12    Prone extensions  3 x 12 0#    Cable rows  7# 3 x 12 R Bent over row, bilat with yellow band pull apart  1# 1x15;  Standing bilat row, red tubing w/ER stab  1x15  Prone 1# 3x10 Prone  5# 3 x 10    Shoulder ER  Side lying eccnetrics AAROM  2x10   Side-lying  0# 3 x 10 with cues to control eccentric   Forward Elevation  Beach chair incline 30-45 deg 1x10;  Standing to 90-deg  x10  1# 2x10 Beach chair incline, 30-45 degrees 3 x 10     Beach press with ball squeeze 2x10; standing to 90 deg 1# 2x10 Standing, to 90 deg  1# 3 x 10        Abduction  Standing to 90-deg  x10  1# 2x10 Standing to 90 deg  3 x 10 Standing to 90 2x10  1# 2x10 Standing to 90  1# 3 x 10   Diagonal Flexion  30-deg Incline  1x10 with manual guiding 30 deg-incline  3 x 10 - -   Diagonal Extension  30-deg Incline  1x10 with manual guiding 30 deg -  Incline   3 x 10 - -   UE [place] 3  x10  -      Internal rotation   Supine belly press w/ball, 3 x 10    Chest press bilateral, pressing ball 3 x 10 supine Supine belly press 3x10,   sitting arm supported at 60 deg and IR yellow band 2x10 Supine belly press 3 x 10     Supine chest press bilaterally, pressing ball 3 x 10     Unilateral supine DB press  1# 3  x6     Treatment/Session Summary:    · Response to Treatment: ROM has made good progress, but has difficulty with control of L UE secondary to external and internal rotation weakness. · Communication/Consultation:  None today  · Equipment provided today:  None today  · Recommendations/Intent for next treatment session: we will continue with controlled shoulder re-strengthening.      Total Treatment Billable Duration: 40 minutes  PT Patient Time In/Time Out  Time In: 1798  [facility]: [facility]     Future Appointments   Date Time Provider Asuncion Mccormick   4/9/2020  9:45 AM [facility] Stout, [facility]   4/13/2020  9:45 AM Eduardo Soliz, PT [facility]   4/16/2020  9:45 AM Deb Stout, PT [facility]   4/20/2020  9:45 AM Eduardo Soliz, PT [facility]   4/23/2020  9:45 AM Roberto Nair, PT [facility]   4/27/2020  9:45 AM PATWIN PRN STAFF [facility]   4/30/2020  9:45 AM VENESSA PRN STAFF [facility]

## 2020-04-06 NOTE — PROGRESS NOTES
[facility]  : 1960  Primary: Sc [employer], [place]*  Secondary:  [address]  at [facility] DULCE ANGUIANO  [address] [facility], [address] [place] [address] [address], [address] [facility]  [phone]   [phone]       OUTPATIENT PHYSICAL THERAPY:Progress Report 2020     ICD-10: Treatment Diagnosis: Pain in left shoulder (M25.512); Presence of left artificial shoulder joint (P41.678)  Precautions/Allergies: Post op precautions. From EMR: Codeine   TREATMENT PLAN:  Effective Dates: 2020 TO 2020 (90 days). Frequency/Duration: 2 times a week for 90 Day(s) MEDICAL/REFERRING DIAGNOSIS:s/p L shoulder I&D, removal of [place] implant, revision reverse TSA, biceps tendonesis, lat dorsi and teres major tendon transfer  L shoulder   DATE OF ONSET: 20 surgery  REFERRING PHYSICIAN: Angus Mcguire MD MD Orders: Eval and Treat; HEP; ROM; \"PROM, AAROM\" (20)  Return MD Appointment: 3/4/20     INITIAL ASSESSMENT:  Ms. Marvin Shelton presents to the clinic 2 weeks s/p Incision, debridement, removal of implant, left shoulder \"Dahiana\" reverse left total shoulder arthroplasty with a proximal humeral osteotomy and revision reverse L total shoulder arthroplasty with a long stem Delta Xtend prosthesis, biceps tenodesis, latissimus dorsi and teres major tendon transfer. She appears to be doing very well at this point. She does have pain to the shoulder, but she reports it is manageable. She has very good shoulder PROM for 2 weeks post op, and she is able to activate deltoid with short arc AROM elevation. Post-op pain, weakness and decreased ROM are limiting normalized use and function of her L/non-dominant UE. She will benefit from PT for guided post-op shoulder rehab to promote safe return to normalized use of the UE with her normal ADL and work activities. This is a revision procedure, and her original injury was in May 2018, so anticipate a slower rehab process.     PROBLEM LIST (Impacting functional limitations):  1. Post-op L shoulder pain  2. Decreased L shoulder ROM   3. Weakness L shoulder INTERVENTIONS PLANNED: (Treatment may consist of any combination of the following)  1. Thermal and electric modalities, manual therapies for pain   2. Manual therapies, therapeutic exercises, HEP for ROM    3. Therapeutic exercises and HEP for strength     GOALS: (Goals have been discussed and agreed upon with patient.)  Short-Term Functional Goals: Time Frame: 4 weeks  1. Report no more than 4/10 intermittent pain to L shoulder with compensatory use during basic functional activities, and score less than 40% on the DASH. MET 4/6/2020  2. L shoulder PROM forward elevation greater than 150 degrees to progress functional ranges. Not tested, 4/6/20  3. Demonstrate good L shoulder/deltoid isometric strength with manual testing to progress into strength phase. MET 4/6/20  4. Independent with initial HEP. Discharge Goals: Time Frame: 10 weeks  1. No more than 3/10 intermittent pain L shoulder with return to normalized household and work activities, and score less than 25% on the DASH. 2. L shoulder AROM forward elevation greater than 135 degrees for use with normalized ADL and work activities. MET 4/6/20  3. Demonstrate good functional shoulder strength and endurance for return to normalized household and work activities. 4. Independent with advanced shoulder HEP for continued self-management. OUTCOME MEASURE:   Tool Used: Disabilities of the Arm, Shoulder and Hand (DASH) Questionnaire - Quick Version  Score:  Initial: 45/55 or 77% disability Most Recent: 28/55 or 39% limited  (Date: 4/6/20 )   Interpretation of Score: The DASH is designed to measure the activities of daily living in person's with upper extremity dysfunction or pain. Each section is scored on a 1-5 scale, 5 representing the greatest disability. The scores of each section are added together for a total score of 55.   This number is divided by 11, followed by subtracting 1 and multiplying by 25 to get a percent score of disability. This value represents the percentage disability: 0-20% minimal disability; 20-40% moderate disability; 40-60% severe disability; % dependent for care or exaggerated symptom behavior. Minimal detectable change is 12%. MEDICAL NECESSITY:   · Patient is expected to demonstrate progress in strength and range of motion to progress to funcitonal use of her L/non-dominant UE. · 2 weeks post op revision reverse TSA  · Current impairments and post op precautions limiting use of L/non-dominant UE  REASON FOR SERVICES/OTHER COMMENTS:  · Patient continues to require skilled intervention due to the complexity of the surgical procedure and need for safe, skilled progressive post op rehab to return to functinoal use of the L/non-dominat UE. New Objective Measures 4/6/20  L shoulder AROM (standing)  Flexion: 140 degrees  ABD: 138 degrees  ER: able to reach back of head with compensation     Strength  Flex 4+/5  ABD 4+/5  IR (elbow at side) 4-/5  ER (elbow at side) 4-/5    Rehabilitation Potential For Stated Goals: Good to Excellent  Regarding Jimmie Francis's therapy, I certify that the treatment plan above will be carried out by a therapist or under their direction.   Thank you for this referral,    Bri Friend, PT

## 2020-04-08 ENCOUNTER — HOSPITAL ENCOUNTER (OUTPATIENT)
Dept: PHYSICAL THERAPY | Age: 60
Discharge: HOME | End: 2020-04-08
Payer: COMMERCIAL

## 2020-04-08 PROCEDURE — 97110 THERAPEUTIC EXERCISES: CPT

## 2020-04-08 NOTE — PROGRESS NOTES
Elizabeth Goodson  : 1960  Payor: [employer]ED [employer], 90 [facility] / Plan: [place] PLANNED Peg Prasanth. / Product Type: Commerical /  [address]  at [facility] DULCE ANGUIANO  [address] [facility], [address], [address] [place] [address]  [phone]   [phone]       OUTPATIENT PHYSICAL THERAPY: Daily Treatment Note 2020  Visit Count: 14     ICD-10: Treatment Diagnosis: Pain in left shoulder (M25.512); Presence of left artificial shoulder joint (A18.734)  Precautions/Allergies: Post op precautions. From EMR: Codeine   TREATMENT PLAN:  Effective Dates: 2020 TO 2020 (90 days). Frequency/Duration: 2 times a week for 90 Day(s) MEDICAL/REFERRING DIAGNOSIS:s/p L shoulder I&D, removal of Graham implant, revision reverse TSA, biceps tendonesis, lat dorsi and teres major tendon transfer  L shoulder   DATE OF ONSET: 20 surgery  REFERRING PHYSICIAN: Anum Mcguire MD MD Orders: \"Continue PT\" (3/3/20)  Return MD Appointment: End of April       Elizabeth Goodson was cleared by the screener to enter the clinic this morning. Pre-treatment Symptoms/Complaints: Reports her shoulder is doing \"really good\". Continue to work on her HEP. Using it more. Still with difficulty reaching to her head and overhead. Pain: Initial:   no VAS Post Session: no complaints of pain   Medications Last Reviewed: 20    Updated Objective Findings:   No new measure. TREATMENT:     Therapeutic Exercise: (45 Minutes): Exercises per grid for shoulder girdle strengthening, with emphasis on standing moves. Exercises progressed and modified as indicated. Manual guiding and verbal cues and demonstration as needed. HEP:  Provided green tubing for home use, and verbal instruction modified exercises done today. She verbalizes understanding.   .    Date:  3/23/20 Date:  3/26/20 Date:  3/30/20 Date  20 Date  20 Date  20   Activity/Exercise Parameters Parameters Parameters parameters     UBE  L. 2 x6' L. 2 x10'   Pulleys X 30  3 ways x20 ea 3 x 20 ea 3x20 3 x 20  -   AROM Supine x 20    Side-lying ABD to 90 deg  3 x 10     Supine  3 x 10 0#    Flexion, standing to 90 deg, 3 x 10  Standing wand ext  x15 Standing wand ext x 15    Wall push ups 3 x 10 Standing wand ext x15  Wall slide  Forward elevation 3x10 with decreased use of wall   Serratus press 2# 3 x 10  Supine  Yellow pull apart  2X10; Inclined 30-45 deg, bilat with yellow pull apart  1x10  Supine yellow pull apart supine 3x10; incline 2x10  Standing, downward   Green 2x15;  Serratus lift  Green 2x10   Shoulder Ext  Prone unilat  1x10  1# 1x10 Prone  1# 3 x 10 Prone 1# 3x10 Prone  1# 3 x 10 Standing  Green 2x15;   Adduction  Green  3x10   Middle trapezius Side-lying   Prone unilat  1x10  1# 1x10 Prone  3x 10 Prone 1# 3x10 - -   Lower trapezius Side-lying Prone unilat  1x10  1# 1x10 Prone  3 x 8 - Prone  0# 1 x 10  1# 2 x 10 -   Scapular retractions Prone rows  3# 3 x 12    Prone extensions  3 x 12 0#    Cable rows  7# 3 x 12 R Bent over row, bilat with yellow band pull apart  1# 1x15;  Standing bilat row, red tubing w/ER stab  1x15  Prone 1# 3x10 Prone  5# 3 x 10  -   Shoulder ER  Side lying eccnetrics AAROM  2x10   Side-lying  0# 3 x 10 with cues to control eccentric -   Forward Elevation  Beach chair incline 30-45 deg 1x10;  Standing to 90-deg  x10  1# 2x10 Beach chair incline, 30-45 degrees 3 x 10     Beach press with ball squeeze 2x10; standing to 90 deg 1# 2x10 Standing, to 90 deg  1# 3 x 10      Standing, to 90 deg  Palm down   1# x10  Palm up  1# x10   Abduction  Standing to 90-deg  x10  1# 2x10 Standing to 90 deg  3 x 10 Standing to 90 2x10  1# 2x10 Standing to 90  1# 3 x 10 -   Diagonal Flexion  30-deg Incline  1x10 with manual guiding 30 deg-incline  3 x 10 - - Wall slide  D1/D2 2x10 ea   Diagonal Extension  30-deg Incline  1x10 with manual guiding 30 deg -  Incline   3 x 10 - - -   UE [place] 3  x10  -    -   Internal rotation   Supine belly press w/ball, 3 x 10    Chest press bilateral, pressing ball 3 x 10 supine Supine belly press 3x10,   sitting arm supported at 60 deg and IR yellow band 2x10 Supine belly press 3 x 10     Supine chest press bilaterally, pressing ball 3 x 10     Unilateral supine DB press  1# 3  x6 -     Treatment/Session Summary:    · Response to Treatment: Good effort with the exercises. Humeral elevation against gravity is progressing. She is working on rotation control with the moves in her HEP and as done today. · Communication/Consultation:  None today  · Equipment provided today:  None today  · Recommendations/Intent for next treatment session: we will continue with controlled shoulder re-strengthening.      Total Treatment Billable Duration: 45 minutes  PT Patient Time In/Time Out  Time In: 1030  Time Out: 435 [facility], PT    Future Appointments   Date Time Provider Asuncion Mccormick   4/13/2020 10:30 AM [facility]   4/15/2020 10:30 AM Joao [facility]veri, [facility]   4/20/2020  9:45 AM Paula Fernández, PT [facility]   4/23/2020  9:45 AM Rohit Guzman, PT [facility]   4/27/2020  9:45 AM [facility]   4/30/2020  9:45 AM VENESSA PRN STAFF [facility]

## 2020-04-09 ENCOUNTER — APPOINTMENT (OUTPATIENT)
Dept: PHYSICAL THERAPY | Age: 60
End: 2020-04-09
Payer: COMMERCIAL

## 2020-04-13 ENCOUNTER — HOSPITAL ENCOUNTER (OUTPATIENT)
Dept: PHYSICAL THERAPY | Age: 60
Discharge: HOME | End: 2020-04-13
Payer: COMMERCIAL

## 2020-04-13 PROCEDURE — 97110 THERAPEUTIC EXERCISES: CPT

## 2020-04-13 NOTE — PROGRESS NOTES
Shannon Lopez  : 1960  Payor: PLANNED [facility], 90 [facility] / Plan: [place] PLANNED Debra Castilloer. / Product Type: Commerical /  [address] [place]  [facility]  [address] [place] [address], [address], [address] [facility]  [phone]   [phone]       OUTPATIENT PHYSICAL THERAPY: Daily Treatment Note 2020  Visit Count: 15     ICD-10: Treatment Diagnosis: Pain in left shoulder (M25.512); Presence of left artificial shoulder joint (X99.330)  Precautions/Allergies: Post op precautions. From EMR: Codeine   TREATMENT PLAN:  Effective Dates: 2020 TO 2020 (90 days). Frequency/Duration: 2 times a week for 90 Day(s) MEDICAL/REFERRING DIAGNOSIS:s/p L shoulder I&D, removal of Nitro implant, revision reverse TSA, biceps tendonesis, lat dorsi and teres major tendon transfer  L shoulder   DATE OF ONSET: 20 surgery  REFERRING PHYSICIAN: Vivien Mcguire MD MD Orders: \"Continue PT\" (3/3/20)  Return MD Appointment: End        Shannon Lopez was cleared by the screener to enter the clinic this morning. Pre-treatment Symptoms/Complaints: Reports her shoulder is doing \"good\". No issues to reports. Has been working on her HEP. Still with difficulty reaching to her head and overhead. Pain: Initial:   no VAS Post Session: no complaints of pain   Medications Last Reviewed: 20    Updated Objective Findings:   ROM: L Shoulder Forward Elevation: 150 in standing     TREATMENT:     Therapeutic Exercise: (45 Minutes): Exercises per grid for shoulder girdle strengthening. Exercises progressed and modified as indicated. Manual guiding and verbal cues and demonstration as needed. Good return demonstration for moves to be done at home. HEP:  Continue with existing HEP with modifications as done today. She verbalizes understanding.   .    Date:  3/23/20 Date:  3/26/20 Date:  3/30/20 Date  20 Date  20 Date  20 Date  20   Activity/Exercise Parameters Parameters Parameters parameters      UBE  L. 2 x6'    L. 2 x10' L. 2 x8'   Pulleys X 30  3 ways x20 ea 3 x 20 ea 3x20 3 x 20  - -   AROM Supine x 20    Side-lying ABD to 90 deg  3 x 10     Supine  3 x 10 0#    Flexion, standing to 90 deg, 3 x 10  Standing wand ext  x15 Standing wand ext x 15    Wall push ups 3 x 10 Standing wand ext x15  Wall slide  Forward elevation 3x10 with decreased use of wall -   Serratus press 2# 3 x 10  Supine  Yellow pull apart  2X10; Inclined 30-45 deg, bilat with yellow pull apart  1x10  Supine yellow pull apart supine 3x10; incline 2x10  Standing, downward   Green 2x15;  Serratus lift  Green 2x10 Standing, downward   Green 2x15; upward   Green 2x15   Shoulder Ext  Prone unilat  1x10  1# 1x10 Prone  1# 3 x 10 Prone 1# 3x10 Prone  1# 3 x 10 Standing  Green 2x15; Adduction  Green  3x10 Standing  Green 1x30;   Adduction  Green 2x15   Middle trapezius Side-lying   Prone unilat  1x10  1# 1x10 Prone  3x 10 Prone 1# 3x10 - - Bent over  1# 2x15   Lower trapezius Side-lying Prone unilat  1x10  1# 1x10 Prone  3 x 8 - Prone  0# 1 x 10  1# 2 x 10 - Bent over  1# 2x15   Scapular retractions Prone rows  3# 3 x 12    Prone extensions  3 x 12 0#    Cable rows  7# 3 x 12 R Bent over row, bilat with yellow band pull apart  1# 1x15;  Standing bilat row, red tubing w/ER stab  1x15  Prone 1# 3x10 Prone  5# 3 x 10  - Standing row,  bilat with ER  Blue x30    Shoulder ER  Side lying eccnetrics AAROM  2x10   Side-lying  0# 3 x 10 with cues to control eccentric - Standing bilat in neutral  Yellow x10 with attempts at static holds   Forward Elevation  Beach chair incline 30-45 deg 1x10;  Standing to 90-deg  x10  1# 2x10 Beach chair incline, 30-45 degrees 3 x 10     Beach press with ball squeeze 2x10; standing to 90 deg 1# 2x10 Standing, to 90 deg  1# 3 x 10      Standing, to 90 deg  Palm down   1# x10  Palm up  1# x10 Wall slide with lift off x15;  45-deg incline press  1# 2x20   Abduction  Standing to 90-deg  x10  1# 2x10 Standing to 90 deg  3 x 10 Standing to 90 2x10  1# 2x10 Standing to 90  1# 3 x 10 - -   Diagonal Flexion  30-deg Incline  1x10 with manual guiding 30 deg-incline  3 x 10 - - Wall slide  D1/D2 2x10 ea Wall slide  D1/D2 with lift off  1x15 ea   Diagonal Extension  30-deg Incline  1x10 with manual guiding 30 deg -  Incline   3 x 10 - - - Standing  Green 1x10 ea   UE [place] 3  x10  -    - -   Internal rotation   Supine belly press w/ball, 3 x 10    Chest press bilateral, pressing ball 3 x 10 supine Supine belly press 3x10,   sitting arm supported at 60 deg and IR yellow band 2x10 Supine belly press 3 x 10     Supine chest press bilaterally, pressing ball 3 x 10     Unilateral supine DB press  1# 3  x6 - Standing bilat in neutral  Yellow x10 with attempts at static holds     Treatment/Session Summary:    · Response to Treatment: Good effort with the exercises. Weakness, especially to anterior deltoid persists, but progressing as PRE progress. · Communication/Consultation:  None today  · Equipment provided today:  None today  · Recommendations/Intent for next treatment session: we will continue with controlled shoulder re-strengthening.      Total Treatment Billable Duration: 45 minutes  PT Patient Time In/Time Out  Time In: 9845  Time Out: 134 [facility], PT    Future Appointments   Date Time Provider Asuncion Mccormick   4/15/2020 10:30 AM Linnea Subramanian, PT [facility]   4/20/2020  9:45 AM Tania Wallis, PT [facility]   4/23/2020  9:45 AM Panchito Hernandez, PT [facility]   4/27/2020  9:45 AM PATEWOOD PRN STAFF [facility]   4/30/2020  9:45 AM PATEWOOD PRN STAFF [facility]

## 2020-04-15 ENCOUNTER — HOSPITAL ENCOUNTER (OUTPATIENT)
Dept: PHYSICAL THERAPY | Age: 60
Discharge: HOME | End: 2020-04-15
Payer: COMMERCIAL

## 2020-04-15 PROCEDURE — 97110 THERAPEUTIC EXERCISES: CPT

## 2020-04-15 NOTE — PROGRESS NOTES
Suzy Bernard  : 1960  Payor: PLANNED [facility], [facility] / Plan: [place] PLANNED Margie Dow. / Product Type: Commerical /  [address]  at [facility] DULCE ANGUIANO  [address] [facility], [address], [address] [facility]  [phone]   [phone]       OUTPATIENT PHYSICAL THERAPY: Daily Treatment Note 4/15/2020  Visit Count: 16     ICD-10: Treatment Diagnosis: Pain in left shoulder (M25.512); Presence of left artificial shoulder joint (Z07.972)  Precautions/Allergies: Post op precautions. From EMR: Codeine   TREATMENT PLAN:  Effective Dates: 2020 TO 2020 (90 days). Frequency/Duration: 2 times a week for 90 Day(s) MEDICAL/REFERRING DIAGNOSIS:s/p L shoulder I&D, removal of Dahiana implant, revision reverse TSA, biceps tendonesis, lat dorsi and teres major tendon transfer  L shoulder   DATE OF ONSET: 20 surgery  REFERRING PHYSICIAN: Rola Mcguire MD MD Orders: \"Continue PT\" (3/3/20)  Return MD Appointment: End of April       Suzy Bernard was cleared by the screener to enter the clinic this morning. Pre-treatment Symptoms/Complaints: Says she was fatigued and a little sore after last session. Doing good this morning. Did her exercises yesterday. Pain: Initial:   no VAS Post Session: no complaints of pain   Medications Last Reviewed: 20    Updated Objective Findings:   ROM: L Shoulder Forward Elevation: 150 in standing     TREATMENT:     Therapeutic Exercise: (45 Minutes): Exercises per grid for shoulder girdle strengthening. Exercises progressed and modified as indicated. Manual guiding and verbal cues and demonstration as needed. Good return demonstration for moves to be done at home. HEP:  Continue with existing HEP. She verbalizes understanding.    Date:  3/23/20 Date:  3/26/20 Date:  3/30/20 Date  20 Date  20 Date  20 Date  20 Date  4/15/20   Activity/Exercise Parameters Parameters Parameters parameters       UBE  L. 2 x6'    L. 2 x10' L. 2 x8' L. 3 x10'   Pulleys X 30  3 ways x20 ea 3 x 20 ea 3x20 3 x 20  - - -   AROM Supine x 20    Side-lying ABD to 90 deg  3 x 10     Supine  3 x 10 0#    Flexion, standing to 90 deg, 3 x 10  Standing wand ext  x15 Standing wand ext x 15    Wall push ups 3 x 10 Standing wand ext x15  Wall slide  Forward elevation 3x10 with decreased use of wall - -   Serratus press 2# 3 x 10  Supine  Yellow pull apart  2X10; Inclined 30-45 deg, bilat with yellow pull apart  1x10  Supine yellow pull apart supine 3x10; incline 2x10  Standing, downward   Green 2x15;  Serratus lift  Green 2x10 Standing, downward   Green 2x15; upward   Green 2x15 Supine press  2# 2x15   Shoulder Ext  Prone unilat  1x10  1# 1x10 Prone  1# 3 x 10 Prone 1# 3x10 Prone  1# 3 x 10 Standing  Green 2x15; Adduction  Green  3x10 Standing  Green 1x30;   Adduction  Green 2x15 -   Middle trapezius Side-lying   Prone unilat  1x10  1# 1x10 Prone  3x 10 Prone 1# 3x10 - - Bent over  1# 2x15 Standing horiz abd   Green 2x15   Lower trapezius Side-lying Prone unilat  1x10  1# 1x10 Prone  3 x 8 - Prone  0# 1 x 10  1# 2 x 10 - Bent over  1# 2x15 -   Scapular retractions Prone rows  3# 3 x 12    Prone extensions  3 x 12 0#    Cable rows  7# 3 x 12 R Bent over row, bilat with yellow band pull apart  1# 1x15;  Standing bilat row, red tubing w/ER stab  1x15  Prone 1# 3x10 Prone  5# 3 x 10  - Standing row,  bilat with ER  Blue x30  Lat pull down, underhand, bilat  7# x15  10# x15   Shoulder ER  Side lying eccnetrics AAROM  2x10   Side-lying  0# 3 x 10 with cues to control eccentric - Standing bilat in neutral  Yellow x10 with attempts at static holds -   Forward Elevation  [place] chair incline 30-45 deg 1x10;  Standing to 90-deg  x10  1# 2x10 Beach chair incline, 30-45 degrees 3 x 10     Beach press with ball squeeze 2x10; standing to 90 deg 1# 2x10 Standing, to 90 deg  1# 3 x 10      Standing, to 90 deg  Palm down   1# x10  Palm up  1# x10 Wall slide with lift off x15;  45-deg incline press  1# 2x20 bilat wall slide scaption with lift off,  Yellow 3x5 with AAROM concentrics   Abduction  Standing to 90-deg  x10  1# 2x10 Standing to 90 deg  3 x 10 Standing to 90 2x10  1# 2x10 Standing to 90  1# 3 x 10 - - horiz abd/add  Standing green 2x15 ea   Diagonal Flexion  30-deg Incline  1x10 with manual guiding 30 deg-incline  3 x 10 - - Wall slide  D1/D2 2x10 ea Wall slide  D1/D2 with lift off  1x15 ea -   Diagonal Extension  30-deg Incline  1x10 with manual guiding 30 deg -  Incline   3 x 10 - - - Standing  Green 1x10 ea Standing   Green 2x15 ea   UE [place] 3  x10  -    - - DC   Internal rotation   Supine belly press w/ball, 3 x 10    Chest press bilateral, pressing ball 3 x 10 supine Supine belly press 3x10,   sitting arm supported at 60 deg and IR yellow band 2x10 Supine belly press 3 x 10     Supine chest press bilaterally, pressing ball 3 x 10     Unilateral supine DB press  1# 3  x6 - Standing bilat in neutral  Yellow x10 with attempts at static holds -   Rhythmic Stabilization        Wall dribble clock, 12-6 o'clock  2x30 rep ea;  Red flex bar forward elevation, slow  3x5 ea     Treatment/Session Summary:    · Response to Treatment: Good effort with the exercises. Difficulty with humeral elevation maintaining rotational control, especially with resistance. · Communication/Consultation:  None today  · Equipment provided today:  None today  · Recommendations/Intent for next treatment session: We will continue with controlled shoulder re-strengthening.      Total Treatment Billable Duration: 45 minutes  PT Patient Time In/Time Out  Time In: 9333  Time Out: 624 [facility], PT    Future Appointments   Date Time Provider Asuncion Mccormick   4/20/2020 10:30 AM Anjali Mosqueda PT [facility]   4/22/2020 10:30 AM DAMEON [facility]   4/27/2020 10:30 AM DAMEON Riggs [facility]   4/29/2020 10:30 AM Sarina Hyde [facility] Paget[facility] PT [facility]

## 2020-04-16 ENCOUNTER — APPOINTMENT (OUTPATIENT)
Dept: PHYSICAL THERAPY | Age: 60
End: 2020-04-16
Payer: COMMERCIAL

## 2020-04-20 ENCOUNTER — HOSPITAL ENCOUNTER (OUTPATIENT)
Dept: PHYSICAL THERAPY | Age: 60
Discharge: HOME | End: 2020-04-20
Payer: COMMERCIAL

## 2020-04-20 PROCEDURE — 97110 THERAPEUTIC EXERCISES: CPT

## 2020-04-20 NOTE — PROGRESS NOTES
Sarah Beth Nuñez  : 1960  Payor: PLANNED [facility], [address] [place] [address] / Plan[place] PL[place] Edward Bagley. / Product Type: Commerical /  [address] [place]  at [facility] DULCE ANGUIANO  [address] [facility], [address], [place] [address].  [phone]   [phone]       OUTPATIENT PHYSICAL THERAPY: Daily Treatment Note 2020  Visit Count: 17     ICD-10: Treatment Diagnosis: Pain in left shoulder (M25.512); Presence of left artificial shoulder joint (M58.192)  Precautions/Allergies: Post op precautions. From EMR: Codeine   TREATMENT PLAN:  Effective Dates: 2020 TO 2020 (90 days). Frequency/Duration: 2 times a week for 90 Day(s) MEDICAL/REFERRING DIAGNOSIS:s/p L shoulder I&D, removal of Dahiana implant, revision reverse TSA, biceps tendonesis, lat dorsi and teres major tendon transfer  L shoulder   DATE OF ONSET: 20 surgery  REFERRING PHYSICIAN: Honey Mcguire MD MD Orders: \"Continue PT\" (3/3/20)  Return MD Appointment: End        Sarah Beth Nuñez was cleared by the screener to enter the clinic this morning. Pre-treatment Symptoms/Complaints: Says she cut two family members hair over the weekend. First time cutting hair since surgery. Felt she did well with this--\"it was good exercise. \" has been working on her HEP. Pain: Initial:   no VAS Post Session: no complaints of pain   Medications Last Reviewed: 20    Updated Objective Findings:   No new measure. TREATMENT:     Therapeutic Exercise: (45 Minutes): Exercises per grid for shoulder motion and strength as per grid. Exercise emphasis on manual guiding and manual resistance as indicated. Manual guiding and verbal cues and demonstration as needed. HEP:  Continue with existing HEP. She verbalizes understanding.    Date:  3/23/20 Date:  3/26/20 Date:  3/30/20 Date  20 Date  20 Date  20 Date  20 Date  4/15/20 Date  20   Activity/Exercise Parameters Parameters Parameters parameters UBE  L. 2 x6'    L. 2 x10' L. 2 x8' L. 3 x10' -   Pulleys X 30  3 ways x20 ea 3 x 20 ea 3x20 3 x 20  - - - -   AROM Supine x 20    Side-lying ABD to 90 deg  3 x 10     Supine  3 x 10 0#    Flexion, standing to 90 deg, 3 x 10  Standing wand ext  x15 Standing wand ext x 15    Wall push ups 3 x 10 Standing wand ext x15  Wall slide  Forward elevation 3x10 with decreased use of wall - - -   Serratus press 2# 3 x 10  Supine  Yellow pull apart  2X10; Inclined 30-45 deg, bilat with yellow pull apart  1x10  Supine yellow pull apart supine 3x10; incline 2x10  Standing, downward   Green 2x15;  Serratus lift  Green 2x10 Standing, downward   Green 2x15; upward   Green 2x15 Supine press  2# 2x15 45-deg beach chair, manual resisted  2x10   Shoulder Ext  Prone unilat  1x10  1# 1x10 Prone  1# 3 x 10 Prone 1# 3x10 Prone  1# 3 x 10 Standing  Green 2x15; Adduction  Green  3x10 Standing  Green 1x30;   Adduction  Green 2x15 - -   Middle trapezius Side-lying   Prone unilat  1x10  1# 1x10 Prone  3x 10 Prone 1# 3x10 - - Bent over  1# 2x15 Standing horiz abd   Green 2x15 -   Lower trapezius Side-lying Prone unilat  1x10  1# 1x10 Prone  3 x 8 - Prone  0# 1 x 10  1# 2 x 10 - Bent over  1# 2x15 - -   Scapular retractions Prone rows  3# 3 x 12    Prone extensions  3 x 12 0#    Cable rows  7# 3 x 12 R Bent over row, bilat with yellow band pull apart  1# 1x15;  Standing bilat row, red tubing w/ER stab  1x15  Prone 1# 3x10 Prone  5# 3 x 10  - Standing row,  bilat with ER  Blue x30  Lat pull down, underhand, bilat  7# x15  10# x15 -   Shoulder ER  Side lying eccnetrics AAROM  2x10   Side-lying  0# 3 x 10 with cues to control eccentric - Standing bilat in neutral  Yellow x10 with attempts at static holds - -   Forward Elevation  [place] chair incline 30-45 deg 1x10;  Standing to 90-deg  x10  1# 2x10 Beach chair incline, 30-45 degrees 3 x 10     Beach press with ball squeeze 2x10; standing to 90 deg 1# 2x10 Standing, to 90 deg  1# 3 x 10 Standing, to 90 deg  Palm down   1# x10  Palm up  1# x10 Wall slide with lift off x15;  45-deg incline press  1# 2x20 bilat wall slide scaption with lift off,  Yellow 3x5 with AAROM concentrics 45-deg beach chair, AROM x10, manual resisted ant deltoid conc/ecc  3x10; straight arm scaption man resisted 2x10;  Standing hand to top of head, 3x5;  500-g overhead lifts at wall to 12, 10, and 2 o'clock x5 ea   Abduction  Standing to 90-deg  x10  1# 2x10 Standing to 90 deg  3 x 10 Standing to 90 2x10  1# 2x10 Standing to 90  1# 3 x 10 - - horiz abd/add  Standing green 2x15 ea -   Diagonal Flexion  30-deg Incline  1x10 with manual guiding 30 deg-incline  3 x 10 - - Wall slide  D1/D2 2x10 ea Wall slide  D1/D2 with lift off  1x15 ea - 45-deg beach chair, AROM x10,   manual resisted conc/ecc 2x10 ea way   Diagonal Extension  30-deg Incline  1x10 with manual guiding 30 deg -  Incline   3 x 10 - - - Standing  Green 1x10 ea Standing   Green 2x15 ea 45-deg beach chair, AROM x10,   manual resisted conc/ecc 2x10 ea way   UE [place] 3  x10  -    - - DC    Internal rotation   Supine belly press w/ball, 3 x 10    Chest press bilateral, pressing ball 3 x 10 supine Supine belly press 3x10,   sitting arm supported at 60 deg and IR yellow band 2x10 Supine belly press 3 x 10     Supine chest press bilaterally, pressing ball 3 x 10     Unilateral supine DB press  1# 3  x6 - Standing bilat in neutral  Yellow x10 with attempts at static holds - -   Rhythmic Stabilization        Wall dribble clock, 12-6 o'clock  2x30 rep ea;  Red flex bar forward elevation, slow  3x5 ea Wall dribble clock, 12-6 o'clock, 500-g ball  1x30-50 rep ea;  Red flex bar forward elevation,   Scaption, abduction, D1, D2 flex, slow 2x5 each   Closed kinetic chain--UE         Quadruped  Alt UE lift x10 ea; Opposites x10 ea     Treatment/Session Summary:    · Response to Treatment: Good effort with the exercises. Improving active overhead lift.  Difficulty with ER control with humeral elevation. Increasing functional use. Doing well with her HEP. · Communication/Consultation:  None today  · Equipment provided today:  None today  · Recommendations/Intent for next treatment session: We will continue with controlled shoulder re-strengthening.      Total Treatment Billable Duration: 45 minutes  PT Patient Time In/Time Out  Time In: 1717  Time Out: [address], PT    Future Appointments   Date Time Provider Asuncion Mccormick   4/22/2020 10:30 AM Theo Badillo, PT [facility]   4/27/2020 10:30 AM DAMEON [facility]   4/29/2020 10:30 AM DAMEON [facility]

## 2020-04-22 ENCOUNTER — HOSPITAL ENCOUNTER (OUTPATIENT)
Dept: PHYSICAL THERAPY | Age: 60
Discharge: HOME | End: 2020-04-22
Payer: COMMERCIAL

## 2020-04-22 PROCEDURE — 97110 THERAPEUTIC EXERCISES: CPT

## 2020-04-22 NOTE — PROGRESS NOTES
Kamala Quintanilla  : 1960  Payor: PLANNED [facility], [address] [place] [address] / Plan: [place] PLANNED [employer]. / Product Type: Commerical /  [address]  at [facility] DULCE ANGUIANO  [address] [facility], [address], [facility].  [phone]   [phone]       OUTPATIENT PHYSICAL THERAPY: Daily Treatment Note 2020  Visit Count: 18     ICD-10: Treatment Diagnosis: Pain in left shoulder (M25.512); Presence of left artificial shoulder joint (T78.175)  Precautions/Allergies: Post op precautions. From EMR: Codeine   TREATMENT PLAN:  Effective Dates: 2020 TO 2020 (90 days). Frequency/Duration: 2 times a week for 90 Day(s) MEDICAL/REFERRING DIAGNOSIS:s/p L shoulder I&D, removal of Dahiana implant, revision reverse TSA, biceps tendonesis, lat dorsi and teres major tendon transfer  L shoulder   DATE OF ONSET: 20 surgery  REFERRING PHYSICIAN: Hui Mcguire MD MD Orders: \"Continue PT\" (3/3/20)  Return MD Appointment: 20       Kamala Quintanilla was cleared by the screener to enter the clinic this morning. Pre-treatment Symptoms/Complaints: Says she painted a railing on her deck yesterday--R hand work. Some of use of the L. L shoulder is no more than sore with use, exercise and activity. Doing her HEP. She has a follow up with Dr. Mary Jane Patterson on Monday. Pain: Initial:   no VAS.  Post Session: no complaints of pain   Medications Last Reviewed: 20    Updated Objective Findings:   ROM:   LUE AROM  L Shoulder Flexion: 145(forward elevation)  L Shoulder ABduction: 145  L Shoulder Internal Rotation: (to posterior hip behind back)  L Shoulder External Rotation: 45(by side, 20 deg in abduction)                 Strength:    L Shoulder:  Abd=5; Flex=4+; Ext=5; ER=3-; IR=3+   L Elbow:   Flex and Ext =5             OUTCOME MEASURE:   Tool Used: Disabilities of the Arm, Shoulder and Hand (DASH) Questionnaire - Quick Version  Score:  Initial:  or 77% disability Most Recent:  or 34% disability (4/22/20)   Interpretation of Score: The DASH is designed to measure the activities of daily living in person's with upper extremity dysfunction or pain. Each section is scored on a 1-5 scale, 5 representing the greatest disability. The scores of each section are added together for a total score of 55. This number is divided by 11, followed by subtracting 1 and multiplying by 25 to get a percent score of disability. This value represents the percentage disability: 0-20% minimal disability; 20-40% moderate disability; 40-60% severe disability; % dependent for care or exaggerated symptom behavior. Minimal detectable change is 12%. TREATMENT:     Active Warm up on UBE x10'. Therapeutic Exercise: (45 Minutes): Exercises per grid for shoulder motion and strength as per grid. Exercise and resistances as indicated. Manual guiding and verbal cues and demonstration as needed. HEP:  Continue with existing HEP. She verbalizes understanding. Date:  3/23/20 Date:  3/26/20 Date:  3/30/20 Date  4-2-20 Date  4/6/20 Date  4/8/20 Date  4/13/20 Date  4/15/20 Date  4/20/20 Date  4/22/20   Activity/Exercise Parameters Parameters Parameters parameters         UBE  L. 2 x6'    L. 2 x10' L. 2 x8' L. 3 x10' - L. 2 x10'    Pulleys X 30  3 ways x20 ea 3 x 20 ea 3x20 3 x 20  - - - - -   AROM Supine x 20    Side-lying ABD to 90 deg  3 x 10     Supine  3 x 10 0#    Flexion, standing to 90 deg, 3 x 10  Standing wand ext  x15 Standing wand ext x 15    Wall push ups 3 x 10 Standing wand ext x15  Wall slide  Forward elevation 3x10 with decreased use of wall - - - -   Serratus press 2# 3 x 10  Supine  Yellow pull apart  2X10;   Inclined 30-45 deg, bilat with yellow pull apart  1x10  Supine yellow pull apart supine 3x10; incline 2x10  Standing, downward   Green 2x15;  Serratus lift  Green 2x10 Standing, downward   Green 2x15; upward   Green 2x15 Supine press  2# 2x15 45-deg beach chair, manual resisted  2x10 Supine press  2# x15  3# x15   Shoulder Ext  Prone unilat  1x10  1# 1x10 Prone  1# 3 x 10 Prone 1# 3x10 Prone  1# 3 x 10 Standing  Green 2x15; Adduction  Green  3x10 Standing  Green 1x30;   Adduction  Green 2x15 - - Bent over  2# 2x10;  Standing cable  7# 3x10     Middle trapezius Side-lying   Prone unilat  1x10  1# 1x10 Prone  3x 10 Prone 1# 3x10 - - Bent over  1# 2x15 Standing horiz abd   Green 2x15 - Bent over  2# 3x10`   Lower trapezius Side-lying Prone unilat  1x10  1# 1x10 Prone  3 x 8 - Prone  0# 1 x 10  1# 2 x 10 - Bent over  1# 2x15 - - Bent over  2# 3x10   Scapular retractions Prone rows  3# 3 x 12    Prone extensions  3 x 12 0#    Cable rows  7# 3 x 12 R Bent over row, bilat with yellow band pull apart  1# 1x15;  Standing bilat row, red tubing w/ER stab  1x15  Prone 1# 3x10 Prone  5# 3 x 10  - Standing row,  bilat with ER  Blue x30  Lat pull down, underhand, bilat  7# x15  10# x15 - Bent Row  2# 1x15  3# 1x15;    Lat pull down, underhand, bilat  10# 1x15  13# 1x15       Shoulder ER  Side lying eccnetrics AAROM  2x10   Side-lying  0# 3 x 10 with cues to control eccentric - Standing bilat in neutral  Yellow x10 with attempts at static holds - - -   Forward Elevation  Beach chair incline 30-45 deg 1x10;  Standing to 90-deg  x10  1# 2x10 Beach chair incline, 30-45 degrees 3 x 10     Beach press with ball squeeze 2x10; standing to 90 deg 1# 2x10 Standing, to 90 deg  1# 3 x 10      Standing, to 90 deg  Palm down   1# x10  Palm up  1# x10 Wall slide with lift off x15;  45-deg incline press  1# 2x20 bilat wall slide scaption with lift off,  Yellow 3x5 with AAROM concentrics 45-deg beach chair, AROM x10, manual resisted ant deltoid conc/ecc  3x10; straight arm scaption man resisted 2x10;  Standing hand to top of head, 3x5;  500-g overhead lifts at wall to 12, 10, and 2 o'clock x5 ea Standing, to 90 deg  2# 2x15    Wall slide with lift off,  unilat  Yellow 3x10     Abduction  Standing to 90-deg  x10  1# 2x10 Standing to 90 deg  3 x 10 Standing to 90 2x10  1# 2x10 Standing to 90  1# 3 x 10 - - horiz abd/add  Standing green 2x15 ea - Standing to 90-deg  2# 3x10   Diagonal Flexion  30-deg Incline  1x10 with manual guiding 30 deg-incline  3 x 10 - - Wall slide  D1/D2 2x10 ea Wall slide  D1/D2 with lift off  1x15 ea - 45-deg beach chair, AROM x10,   manual resisted conc/ecc 2x10 ea way -   Diagonal Extension  30-deg Incline  1x10 with manual guiding 30 deg -  Incline   3 x 10 - - - Standing  Green 1x10 ea Standing   Green 2x15 ea 45-deg beach chair, AROM x10,   manual resisted conc/ecc 2x10 ea way -   UE [place] 3  x10  -    - - DC     Internal rotation   Supine belly press w/ball, 3 x 10    Chest press bilateral, pressing ball 3 x 10 supine Supine belly press 3x10,   sitting arm supported at 60 deg and IR yellow band 2x10 Supine belly press 3 x 10     Supine chest press bilaterally, pressing ball 3 x 10     Unilateral supine DB press  1# 3  x6 - Standing bilat in neutral  Yellow x10 with attempts at static holds - - -   Rhythmic Stabilization        Wall dribble clock, 12-6 o'clock  2x30 rep ea;  Red flex bar forward elevation, slow  3x5 ea Wall dribble clock, 12-6 o'clock, 500-g ball  1x30-50 rep ea;  Red flex bar forward elevation,   Scaption, abduction, D1, D2 flex, slow 2x5 each -   Closed kinetic chain--UE         Quadruped  Alt UE lift x10 ea; Opposites x10 ea -     Treatment/Session Summary:    · Response to Treatment: Treatment emphasis on strength PRE's today. Very good effort with the exercises. Good functional ranges. Weak to shoulder--still weak to rotational cotnrol with humeral elevation. Improving overall with significant improvement in DASH. · Communication/Consultation:  None today  · Equipment provided today:  None today  · Recommendations/Intent for next treatment session: We will continue with controlled shoulder re-strengthening. She is to follow up with Dr. Uvaldo Preston on Monday.      Total Treatment Billable Duration: 45 minutes  PT Patient Time In/Time Out  Time In: 1045  Time Out: [address] [facility], PT    Future Appointments   Date Time Provider Asuncion Griffini   4/27/2020 10:30 AM DAMEON Millan [facility]   4/29/2020 10:30 AM DAMEON Millan [facility]

## 2020-04-22 NOTE — PROGRESS NOTES
Elizabeth Goodson  : 1960  Primary: [place] [employer] [place]*  Secondary:  [address]  at [facility] DULCE ANGUIANO  [address] [facility], [address] [place] [address],[address], [place] [address].  [phone]   [phone]       OUTPATIENT PHYSICAL THERAPY:Progress Report 2020     ICD-10: Treatment Diagnosis: Pain in left shoulder (M25.512); Presence of left artificial shoulder joint (W11.764)  Precautions/Allergies: Post op precautions. From EMR: Codeine   TREATMENT PLAN:  Effective Dates: 2020 TO 2020 (90 days). Frequency/Duration: 2 times a week for 90 Day(s) MEDICAL/REFERRING DIAGNOSIS:s/p L shoulder I&D, removal of [place] implant, revision reverse TSA, biceps tendonesis, lat dorsi and teres major tendon transfer  L shoulder   DATE OF ONSET: 20 surgery  REFERRING PHYSICIAN: Anum Mcguire MD MD Orders: Eval and Treat; HEP; ROM; \"PROM, AAROM\" (20)  Return MD Appointment: 2720     PROGRESS ASSESSMENT (20):  Ms. Hannah Shay has attended 18 total visits to date. She is now 11 weeks s/p Incision, debridement, removal of implant, left shoulder \"Dahiana\" reverse left total shoulder arthroplasty with a proximal humeral osteotomy and revision reverse L total shoulder arthroplasty with a long stem Delta Xtend prosthesis, biceps tenodesis, latissimus dorsi and teres major tendon transfer. She continues to make very good progress. She has no more than soreness with use and exercise to the L shoulder. She has very good shoulder PROM. AROM and stregnth are progressing. She is weak to the shoulder and shoulder girdle as expected, but making progress. Rotational control weakness is limiting humeral elevation, especially carrying anything in the L hand. She is progressing toward her goals. No more than minimal improved on the DASH score compared to 2 weeks ago. We will plan to continue with her current treatment plan for re-strengthening.  Again, this is a revision procedure, and her original injury was in May 2018, so anticipate a slower rehab process. PROBLEM LIST (Impacting functional limitations):  1. Decreased L shoulder ROM   2. Weakness L shoulder INTERVENTIONS PLANNED: (Treatment may consist of any combination of the following)  1. Manual therapies, therapeutic exercises, HEP for ROM    2. Therapeutic exercises and HEP for strength     GOALS: (Goals have been discussed and agreed upon with patient.)  Short-Term Functional Goals: Time Frame: 4 weeks  1. Report no more than 4/10 intermittent pain to L shoulder with compensatory use during basic functional activities, and score less than 40% on the DASH. MET 4/6/2020  2. L shoulder PROM forward elevation greater than 150 degrees to progress functional ranges. Met, 4/22/20  3. Demonstrate good L shoulder/deltoid isometric strength with manual testing to progress into strength phase. MET 4/6/20  4. Independent with initial HEP. Met, 4/22/20  Discharge Goals: Time Frame: 10 weeks  1. No more than 3/10 intermittent pain L shoulder with return to normalized household and work activities, and score less than 25% on the DASH. Ongoing 4/22/20  2. L shoulder AROM forward elevation greater than 135 degrees for use with normalized ADL and work activities. MET 4/6/20  3. Demonstrate good functional shoulder strength and endurance for return to normalized household and work activities. Ongoing, progressing 4/22/20  4. Independent with advanced shoulder HEP for continued self-management.  Ongoing, progressing 4/22/20    Updated Objective Findings:   ROM:   LUE AROM  L Shoulder Flexion: 145(forward elevation)  L Shoulder ABduction: 145  L Shoulder Internal Rotation: (to posterior hip behind back)  L Shoulder External Rotation: 45(by side, 20 deg in abduction)                 Strength:    L Shoulder:  Abd=5; Flex=4+; Ext=5; ER=3-; IR=3+   L Elbow:   Flex and Ext =5             OUTCOME MEASURE:   Tool Used: Disabilities of the Arm, Shoulder and Hand (DASH) Questionnaire - Quick Version  Score:  Initial: 45/55 or 77% disability 28/55 or 39% limited  (Date: 4/6/20 ) Most Recent: 26/55 or 34% disability (4/22/20)   Interpretation of Score: The DASH is designed to measure the activities of daily living in person's with upper extremity dysfunction or pain. Each section is scored on a 1-5 scale, 5 representing the greatest disability. The scores of each section are added together for a total score of 55. This number is divided by 11, followed by subtracting 1 and multiplying by 25 to get a percent score of disability. This value represents the percentage disability: 0-20% minimal disability; 20-40% moderate disability; 40-60% severe disability; % dependent for care or exaggerated symptom behavior. Minimal detectable change is 12%. MEDICAL NECESSITY:   · Patient is expected to demonstrate progress in strength and range of motion to progress to funcitonal use of her L/non-dominant UE. · Current impairments and post op precautions limiting use of L/non-dominant UE  REASON FOR SERVICES/OTHER COMMENTS:  · Patient continues to require skilled intervention due to the complexity of the surgical procedure and need for safe, skilled progressive post op rehab to return to functinoal use of the L/non-dominat UE.   Rehabilitation Potential For Stated Goals: Good to Excellent    Vida Cano, PT, MSPT, OCS

## 2020-04-23 ENCOUNTER — APPOINTMENT (OUTPATIENT)
Dept: PHYSICAL THERAPY | Age: 60
End: 2020-04-23
Payer: COMMERCIAL

## 2020-04-27 ENCOUNTER — HOSPITAL ENCOUNTER (OUTPATIENT)
Dept: PHYSICAL THERAPY | Age: 60
Discharge: HOME | End: 2020-04-27
Payer: COMMERCIAL

## 2020-04-27 PROCEDURE — 97110 THERAPEUTIC EXERCISES: CPT

## 2020-04-27 NOTE — PROGRESS NOTES
Jennifer Palmer  : 1960  Payor: PLANNED [facility], [address] [facility] / Plan: [place] PLANNED [employer]. / Product Type: Commerical /  [address]  at [facility] DULCE ANGUIANO  [address] [facility], [address], [address] [facility]  [phone]   [phone]       OUTPATIENT PHYSICAL THERAPY: Daily Treatment Note 2020  Visit Count: 19     ICD-10: Treatment Diagnosis: Pain in left shoulder (M25.512); Presence of left artificial shoulder joint (A03.076)  Precautions/Allergies: Post op precautions. From EMR: Codeine   TREATMENT PLAN:  Effective Dates: 2020 TO 2020 (90 days). Frequency/Duration: 2 times a week for 90 Day(s) MEDICAL/REFERRING DIAGNOSIS:s/p L shoulder I&D, removal of [place] implant, revision reverse TSA, biceps tendonesis, lat dorsi and teres major tendon transfer  L shoulder   DATE OF ONSET:  surgery  REFERRING PHYSICIAN: Nina Mcguire MD MD Orders: Eval and Treat; HEP; ROM; Strength; \"full motion/full strength\" (20)  Return MD Appointment: 20       Jennifer Palmer was cleared by the screener to enter the clinic this morning. She report having her follow up with Dr. Dary Medina this morning. X-rays done. He is pleased with her progress. She was given a new order to continue the PT, and she is to follow up again in 6 weeks. Pre-treatment Symptoms/Complaints: Says she is doing well. No issues to report. Has been using her left arm as much as she can. It gets fatigued with increased use. .  Pain: Initial:   no VAS. Post Session: no complaints of pain   Medications Last Reviewed: Tylonol prn and post PT. 2720    Updated Objective Findings:   No new measure. TREATMENT:     Active Warm up on UBE x10'. Therapeutic Exercise: (45 Minutes): Exercises per grid for shoulder motion and strength as per grid. Exercise and resistances as indicated.  Shoulder AROM with Muscle Balance moves for upper quarter, including standing with back against wall bilateral ER in shoulder neutral and abduction (close to 90 deg as possible), IR in abduction, facing wall ER at 90 deg flexion AAROM, facing wall bilat forearm wall slide, and back to wall serratus lift with red band, 3\"x10 each with emphasis on stable spine/scapula with mobile shoulder and disassociation of movement at shoulder. Manual guiding and verbal cues and demonstration as needed. HEP:  No new HEP. Date:  3/23/20 Date:  3/26/20 Date:  3/30/20 Date  4-2-20 Date  4/6/20 Date  4/8/20 Date  4/13/20 Date  4/15/20 Date  4/20/20 Date  4/22/20 Date  4/27/20   Activity/Exercise Parameters Parameters Parameters parameters          UBE  L. 2 x6'    L. 2 x10' L. 2 x8' L. 3 x10' - L. 2 x10'  L. 3 x10'   Pulleys X 30  3 ways x20 ea 3 x 20 ea 3x20 3 x 20  - - - - - -   AROM Supine x 20    Side-lying ABD to 90 deg  3 x 10     Supine  3 x 10 0#    Flexion, standing to 90 deg, 3 x 10  Standing wand ext  x15 Standing wand ext x 15    Wall push ups 3 x 10 Standing wand ext x15  Wall slide  Forward elevation 3x10 with decreased use of wall - - - - Muscle Balance drills as above   Serratus press 2# 3 x 10  Supine  Yellow pull apart  2X10; Inclined 30-45 deg, bilat with yellow pull apart  1x10  Supine yellow pull apart supine 3x10; incline 2x10  Standing, downward   Green 2x15;  Serratus lift  Green 2x10 Standing, downward   Green 2x15; upward   Green 2x15 Supine press  2# 2x15 45-deg beach chair, manual resisted  2x10 Supine press  2# x15  3# x15 Supine  3# 1x10  4# 2x10   Shoulder Ext  Prone unilat  1x10  1# 1x10 Prone  1# 3 x 10 Prone 1# 3x10 Prone  1# 3 x 10 Standing  Green 2x15; Adduction  Green  3x10 Standing  Green 1x30;   Adduction  Green 2x15 - - Bent over  2# 2x10;  Standing cable  7# 3x10   Standing cable  7# x15  10# 2x10   Middle trapezius Side-lying   Prone unilat  1x10  1# 1x10 Prone  3x 10 Prone 1# 3x10 - - Bent over  1# 2x15 Standing horiz abd   Green 2x15 - Bent over  2# 3x10` Bent over, bilat  3# 3x10   Lower trapezius Side-lying Prone unilat  1x10  1# 1x10 Prone  3 x 8 - Prone  0# 1 x 10  1# 2 x 10 - Bent over  1# 2x15 - - Bent over  2# 3x10 -   Scapular retractions Prone rows  3# 3 x 12    Prone extensions  3 x 12 0#    Cable rows  7# 3 x 12 R Bent over row, bilat with yellow band pull apart  1# 1x15;  Standing bilat row, red tubing w/ER stab  1x15  Prone 1# 3x10 Prone  5# 3 x 10  - Standing row,  bilat with ER  Blue x30  Lat pull down, underhand, bilat  7# x15  10# x15 - Bent Row  2# 1x15  3# 1x15;    Lat pull down, underhand, bilat  10# 1x15  13# 1x15     -   Shoulder ER  Side lying eccnetrics AAROM  2x10   Side-lying  0# 3 x 10 with cues to control eccentric - Standing bilat in neutral  Yellow x10 with attempts at static holds - - - -   Forward Elevation  Beach chair incline 30-45 deg 1x10;  Standing to 90-deg  x10  1# 2x10 Beach chair incline, 30-45 degrees 3 x 10     Beach press with ball squeeze 2x10; standing to 90 deg 1# 2x10 Standing, to 90 deg  1# 3 x 10      Standing, to 90 deg  Palm down   1# x10  Palm up  1# x10 Wall slide with lift off x15;  45-deg incline press  1# 2x20 bilat wall slide scaption with lift off,  Yellow 3x5 with AAROM concentrics 45-deg beach chair, AROM x10, manual resisted ant deltoid conc/ecc  3x10; straight arm scaption man resisted 2x10;  Standing hand to top of head, 3x5;  500-g overhead lifts at wall to 12, 10, and 2 o'clock x5 ea Standing, to 90 deg  2# 2x15    Wall slide with lift off,  unilat  Yellow 3x10   -   Abduction  Standing to 90-deg  x10  1# 2x10 Standing to 90 deg  3 x 10 Standing to 90 2x10  1# 2x10 Standing to 90  1# 3 x 10 - - horiz abd/add  Standing green 2x15 ea - Standing to 90-deg  2# 3x10 bilat   3# 3x10   Diagonal Flexion  30-deg Incline  1x10 with manual guiding 30 deg-incline  3 x 10 - - Wall slide  D1/D2 2x10 ea Wall slide  D1/D2 with lift off  1x15 ea - 45-deg beach chair, AROM x10,   manual resisted conc/ecc 2x10 ea way - -   Diagonal Extension  30-deg Incline  1x10 with manual guiding 30 deg -  Incline   3 x 10 - - - Standing  Green 1x10 ea Standing   Green 2x15 ea 45-deg beach chair, AROM x10,   manual resisted conc/ecc 2x10 ea way - -   UE [place] 3  x10  -    - - DC      Internal rotation   Supine belly press w/ball, 3 x 10    Chest press bilateral, pressing ball 3 x 10 supine Supine belly press 3x10,   sitting arm supported at 60 deg and IR yellow band 2x10 Supine belly press 3 x 10     Supine chest press bilaterally, pressing ball 3 x 10     Unilateral supine DB press  1# 3  x6 - Standing bilat in neutral  Yellow x10 with attempts at static holds - - - -   Rhythmic Stabilization        Wall dribble clock, 12-6 o'clock  2x30 rep ea;  Red flex bar forward elevation, slow  3x5 ea Wall dribble clock, 12-6 o'clock, 500-g ball  1x30-50 rep ea;  Red flex bar forward elevation,   Scaption, abduction, D1, D2 flex, slow 2x5 each - Wall dribble, 12-6 o'clock, 500-g ball   2x30-40 ea   Closed kinetic chain--UE         Quadruped  Alt UE lift x10 ea; Opposites x10 ea - Quadruped opposites 3x5 ea;  Quadruped to down dog x10     Treatment/Session Summary:    · Response to Treatment: Good effort with the exercises. Progressed resistance as indicated. · Communication/Consultation:  None today  · Equipment provided today:  None today  · Recommendations/Intent for next treatment session: We will continue with controlled shoulder re-strengthening.      Total Treatment Billable Duration: 45 minutes  PT Patient Time In/Time Out  Time In: 1030  Time Out: [facility], PT    Future Appointments   Date Time Provider Asuncion Mccormick   4/29/2020 10:30 AM Wilson Bers, Retia Paget, PT [facility]

## 2020-04-29 ENCOUNTER — HOSPITAL ENCOUNTER (OUTPATIENT)
Dept: PHYSICAL THERAPY | Age: 60
Discharge: HOME | End: 2020-04-29
Payer: COMMERCIAL

## 2020-04-29 PROCEDURE — 97110 THERAPEUTIC EXERCISES: CPT

## 2020-04-29 NOTE — PROGRESS NOTES
Meme Napier  : 1960  Payor: PLANNED [facility], [address] [place] [address] / Plan: [place] PLANNED Alyssalidia Platt. / Product Type: Commerical /  [address]  at [facility] DULCE ANGUIANO  [address] [facility], [address], [address] [facility]  [phone]   [phone]       OUTPATIENT PHYSICAL THERAPY: Daily Treatment Note 2020  Visit Count: 20     ICD-10: Treatment Diagnosis: Pain in left shoulder (M25.512); Presence of left artificial shoulder joint (Z16.557)  Precautions/Allergies: Post op precautions. From EMR: Codeine   TREATMENT PLAN:  Effective Dates: 2020 TO 2020 (90 days). Frequency/Duration: 2 times a week for 90 Day(s) MEDICAL/REFERRING DIAGNOSIS:s/p L shoulder I&D, removal of [place] implant, revision reverse TSA, biceps tendonesis, lat dorsi and teres major tendon transfer  L shoulder   DATE OF ONSET:  surgery  REFERRING PHYSICIAN: Jolanta Mcguire MD MD Orders: Eval and Treat; HEP; ROM; Strength; \"full motion/full strength\" (20)  Return MD Appointment: 20       Meme Napier was cleared by the screener to enter the clinic this morning. Pre-treatment Symptoms/Complaints: Says she is doing well. Was not too sore after last time. Pain: Initial:   no VAS. Post Session: no complaints of pain   Medications Last Reviewed: Tylonol prn and post PT. 2720    Updated Objective Findings:   No new measure. TREATMENT:     Active Warm up on UBE x10'. Therapeutic Exercise: (45 Minutes): Exercises per grid for shoulder motion and strength as per grid. Exercise and resistances as indicated.  Shoulder AROM with Muscle Balance moves for upper quarter, including standing with back against wall bilateral ER in shoulder neutral and abduction (close to 90 deg as possible), IR in abduction, facing wall ER at 90 deg flexion AAROM, facing wall bilat forearm wall slide, and back to wall serratus lift with red band, 3\"x10 each with emphasis on stable spine/scapula with mobile shoulder and disassociation of movement at shoulder. Manual guiding and verbal cues and demonstration as needed. HEP:  No new HEP. Date:  3/23/20 Date:  3/26/20 Date:  3/30/20 Date  4-2-20 Date  4/6/20 Date  4/8/20 Date  4/13/20 Date  4/15/20 Date  4/20/20 Date  4/22/20 Date  4/27/20 Date  4/29/20   Activity/Exercise Parameters Parameters Parameters parameters           UBE  L. 2 x6'    L. 2 x10' L. 2 x8' L. 3 x10' - L. 2 x10'  L. 3 x10' L. 3 x10'   Pulleys X 30  3 ways x20 ea 3 x 20 ea 3x20 3 x 20  - - - - - - -   AROM Supine x 20    Side-lying ABD to 90 deg  3 x 10     Supine  3 x 10 0#    Flexion, standing to 90 deg, 3 x 10  Standing wand ext  x15 Standing wand ext x 15    Wall push ups 3 x 10 Standing wand ext x15  Wall slide  Forward elevation 3x10 with decreased use of wall - - - - Muscle Balance drills as above Muscle Balance drills as above   Serratus press 2# 3 x 10  Supine  Yellow pull apart  2X10; Inclined 30-45 deg, bilat with yellow pull apart  1x10  Supine yellow pull apart supine 3x10; incline 2x10  Standing, downward   Green 2x15;  Serratus lift  Green 2x10 Standing, downward   Green 2x15; upward   Green 2x15 Supine press  2# 2x15 45-deg beach chair, manual resisted  2x10 Supine press  2# x15  3# x15 Supine  3# 1x10  4# 2x10 Supine  4# 1x10  5# 2x10   Shoulder Ext  Prone unilat  1x10  1# 1x10 Prone  1# 3 x 10 Prone 1# 3x10 Prone  1# 3 x 10 Standing  Green 2x15; Adduction  Green  3x10 Standing  Green 1x30;   Adduction  Green 2x15 - - Bent over  2# 2x10;  Standing cable  7# 3x10   Standing cable  7# x15  10# 2x10 Standing  10# cable 3x10   Middle trapezius Side-lying   Prone unilat  1x10  1# 1x10 Prone  3x 10 Prone 1# 3x10 - - Bent over  1# 2x15 Standing horiz abd   Green 2x15 - Bent over  2# 3x10` Bent over, bilat  3# 3x10 Bent over, bilat  3# 3x10   Lower trapezius Side-lying Prone unilat  1x10  1# 1x10 Prone  3 x 8 - Prone  0# 1 x 10  1# 2 x 10 - Bent over  1# 2x15 - - Bent over  2# 3x10 - -   Scapular retractions Prone rows  3# 3 x 12    Prone extensions  3 x 12 0#    Cable rows  7# 3 x 12 R Bent over row, bilat with yellow band pull apart  1# 1x15;  Standing bilat row, red tubing w/ER stab  1x15  Prone 1# 3x10 Prone  5# 3 x 10  - Standing row,  bilat with ER  Blue x30  Lat pull down, underhand, bilat  7# x15  10# x15 - Bent Row  2# 1x15  3# 1x15;    Lat pull down, underhand, bilat  10# 1x15  13# 1x15     - Bent row  5# x10  7# x10  8# x10   Shoulder ER  Side lying eccnetrics AAROM  2x10   Side-lying  0# 3 x 10 with cues to control eccentric - Standing bilat in neutral  Yellow x10 with attempts at static holds - - - - Propped in plane of scap, eccentric  0# x10  1# 2x10   Forward Elevation  Beach chair incline 30-45 deg 1x10;  Standing to 90-deg  x10  1# 2x10 Beach chair incline, 30-45 degrees 3 x 10     Beach press with ball squeeze 2x10; standing to 90 deg 1# 2x10 Standing, to 90 deg  1# 3 x 10      Standing, to 90 deg  Palm down   1# x10  Palm up  1# x10 Wall slide with lift off x15;  45-deg incline press  1# 2x20 bilat wall slide scaption with lift off,  Yellow 3x5 with AAROM concentrics 45-deg beach chair, AROM x10, manual resisted ant deltoid conc/ecc  3x10; straight arm scaption man resisted 2x10;  Standing hand to top of head, 3x5;  500-g overhead lifts at wall to 12, 10, and 2 o'clock x5 ea Standing, to 90 deg  2# 2x15    Wall slide with lift off,  unilat  Yellow 3x10   - Wall slide with lift off, bilat  Yellow 2x10;  Standing forward raise to 90-deg  3# 3x10   Abduction  Standing to 90-deg  x10  1# 2x10 Standing to 90 deg  3 x 10 Standing to 90 2x10  1# 2x10 Standing to 90  1# 3 x 10 - - horiz abd/add  Standing green 2x15 ea - Standing to 90-deg  2# 3x10 bilat   3# 3x10 bialt  3# 3x10   Diagonal Flexion  30-deg Incline  1x10 with manual guiding 30 deg-incline  3 x 10 - - Wall slide  D1/D2 2x10 ea Wall slide  D1/D2 with lift off  1x15 ea - 45-deg beach chair, AROM x10,   manual resisted conc/ecc 2x10 ea way - - -   Diagonal Extension  30-deg Incline  1x10 with manual guiding 30 deg -  Incline   3 x 10 - - - Standing  Green 1x10 ea Standing   Green 2x15 ea 45-deg beach chair, AROM x10,   manual resisted conc/ecc 2x10 ea way - - -   UE [place] 3  x10  -    - - DC       Internal rotation   Supine belly press w/ball, 3 x 10    Chest press bilateral, pressing ball 3 x 10 supine Supine belly press 3x10,   sitting arm supported at 60 deg and IR yellow band 2x10 Supine belly press 3 x 10     Supine chest press bilaterally, pressing ball 3 x 10     Unilateral supine DB press  1# 3  x6 - Standing bilat in neutral  Yellow x10 with attempts at static holds - - - - -   Rhythmic Stabilization        Wall dribble clock, 12-6 o'clock  2x30 rep ea;  Red flex bar forward elevation, slow  3x5 ea Wall dribble clock, 12-6 o'clock, 500-g ball  1x30-50 rep ea;  Red flex bar forward elevation,   Scaption, abduction, D1, D2 flex, slow 2x5 each - Wall dribble, 12-6 o'clock, 500-g ball   2x30-40 ea -   Closed kinetic chain--UE         Quadruped  Alt UE lift x10 ea; Opposites x10 ea - Quadruped opposites 3x5 ea;  Quadruped to down dog x10 Quadruped opposites 1x10;   Knee push up, 1/2 range  2x5     Treatment/Session Summary:    · Response to Treatment: Good effort with the exercises. · Communication/Consultation:  None today  · Equipment provided today:  None today  · Recommendations/Intent for next treatment session: We will continue with controlled shoulder re-strengthening. Total Treatment Billable Duration: 45 minutes  PT Patient Time In/Time Out  Time In: 1040  Time Out: [address] [facility], PT    No future appointments.

## 2020-04-30 ENCOUNTER — APPOINTMENT (OUTPATIENT)
Dept: PHYSICAL THERAPY | Age: 60
End: 2020-04-30
Payer: COMMERCIAL

## 2020-05-04 ENCOUNTER — HOSPITAL ENCOUNTER (OUTPATIENT)
Dept: PHYSICAL THERAPY | Age: 60
Discharge: HOME | End: 2020-05-04
Payer: COMMERCIAL

## 2020-05-04 PROCEDURE — 97110 THERAPEUTIC EXERCISES: CPT

## 2020-05-04 NOTE — PROGRESS NOTES
Suzy Bernard  : 1960  Payor: [employer], [address] [place] [address] / Plan: [place] [employer] / Product Type: Commerical /  [address]  at [facility] DULCE ANGUIANO  [address] [facility], [address], [place] [address].  [phone]   [phone]       OUTPATIENT PHYSICAL THERAPY: Daily Treatment Note 2020  Visit Count: 21     ICD-10: Treatment Diagnosis: Pain in left shoulder (M25.512); Presence of left artificial shoulder joint (D01.246)  Precautions/Allergies: Post op precautions. From EMR: Codeine   TREATMENT PLAN:  Effective Dates: 2020 TO 2020 (90 days). Frequency/Duration: 2 times a week for 90 Day(s) MEDICAL/REFERRING DIAGNOSIS:s/p L shoulder I&D, removal of [place] implant, revision reverse TSA, biceps tendonesis, lat dorsi and teres major tendon transfer  L shoulder   DATE OF ONSET:  surgery  REFERRING PHYSICIAN: Rola Mcguire MD MD Orders: Eval and Treat; HEP; ROM; Strength; \"full motion/full strength\" (20)  Return MD Appointment: 20       Suzy Bernard was cleared by the screener to enter the clinic this morning. Pre-treatment Symptoms/Complaints: Says she is doing well. Busy weekend helping her  hang a storm door and painting their deck. Used the L arm a bit. Fatigue, but not too sore after this. Has been working on her strength exercises. Pain: Initial:   no pain. Post Session: no complaints of pain   Medications Last Reviewed: Tylonol prn and post PT. 2720    Updated Objective Findings:   No new measure. TREATMENT:     Active Warm up on UBE x10'. Therapeutic Exercise: (45 Minutes): Exercises per grid for shoulder motion and strength, with emphasis on humeral rotational control as per grid. Exercise and resistances as indicated.  Shoulder AROM with Muscle Balance moves for upper quarter, including standing with back against wall bilateral ER in shoulder neutral and abduction (close to 90 deg as possible), IR in abduction, facing wall ER at 90 deg flexion AAROM, facing wall bilat forearm wall slide, 3\"x10 each with manual guiding and L UE rotational re-positioning assist and emphasis on stable spine/scapula with mobile shoulder. Manual guiding and verbal cues and demonstration as needed. HEP:  No new HEP.     Date  4/20/20 Date  4/22/20 Date  4/27/20 Date  4/29/20 Date  5/4/20   Activity/Exercise        UBE - L. 2 x10'  L. 3 x10' L. 3 x10' L. 3 x10'   Pulleys - - - - D/C   AROM - - Muscle Balance drills as above Muscle Balance drills as above Muscle Balance drills as above   Serratus press 45-deg beach chair, manual resisted  2x10 Supine press  2# x15  3# x15 Supine  3# 1x10  4# 2x10 Supine  4# 1x10  5# 2x10 -   Shoulder Ext - Bent over  2# 2x10;  Standing cable  7# 3x10   Standing cable  7# x15  10# 2x10 Standing  10# cable 3x10 -   Middle trapezius - Bent over  2# 3x10` Bent over, bilat  3# 3x10 Bent over, bilat  3# 3x10 -   Lower trapezius - Bent over  2# 3x10 - - -   Scapular retractions - Bent Row  2# 1x15  3# 1x15;    Lat pull down, underhand, bilat  10# 1x15  13# 1x15     - Bent row  5# x10  7# x10  8# x10 -   Shoulder ER - - - Propped in plane of scap, eccentric  0# x10  1# 2x10 Standing,   by side  Yellow single  10\" hold x10 with assist;  supported at 90-deg scaption  Yellow  10\"x10 with assist   Forward Elevation 45-deg beach chair, AROM x10, manual resisted ant deltoid conc/ecc  3x10; straight arm scaption man resisted 2x10;  Standing hand to top of head, 3x5;  500-g overhead lifts at wall to 12, 10, and 2 o'clock x5 ea Standing, to 90 deg  2# 2x15    Wall slide with lift off,  unilat  Yellow 3x10   - Wall slide with lift off, bilat  Yellow 2x10;  Standing forward raise to 90-deg  3# 3x10 -   Abduction - Standing to 90-deg  2# 3x10 bilat   3# 3x10 bialt  3# 3x10 -   Diagonal Flexion 45-deg beach chair, AROM x10,   manual resisted conc/ecc 2x10 ea way - - - -   Diagonal Extension 45-deg beach chair, AROM x10, manual resisted conc/ecc 2x10 ea way - - - -   UE [place]     -   Internal rotation - - - - Standing,   by side  Yellow single  10\" hold x10 with assist;  supported at 90-deg scaption  Yellow  10\"x10 with assist   Rhythmic Stabilization Wall dribble clock, 12-6 o'clock, 500-g ball  1x30-50 rep ea;  Red flex bar forward elevation,   Scaption, abduction, D1, D2 flex, slow 2x5 each - Wall dribble, 12-6 o'clock, 500-g ball   2x30-40 ea - Red flex bar: scaption, abd, D1, D2   3x5 ea   Closed kinetic chain--UE Quadruped  Alt UE lift x10 ea; Opposites x10 ea - Quadruped opposites 3x5 ea;  Quadruped to down dog x10 Quadruped opposites 1x10;   Knee push up, 1/2 range  2x5 -     Treatment/Session Summary:    · Response to Treatment: Good effort with the exercises. Working compensated rotational control strength for improved humeral elevation control. Quite weak with these moves, especially ER control. · Communication/Consultation:  None today  · Equipment provided today:  None today  · Recommendations/Intent for next treatment session: We will continue with controlled shoulder re-strengthening.      Total Treatment Billable Duration: 35 minutes  PT Patient Time In/Time Out  Time In: 1100  Time Out: [address] [facility], PT    Future Appointments   Date Time Provider Asuncion Mccormick   5/6/2020 10:45 AM Viviane Perkins, PT [facility]   5/11/2020  9:00 AM Viviane Perkins, PT [facility]   5/13/2020  9:00 AM Viviane Perkins, PT [facility]   5/18/2020  9:00 AM Viviane Perkins, PT [facility]   5/20/2020  9:00 AM Viviane Perkins, PT [facility]

## 2020-05-06 ENCOUNTER — HOSPITAL ENCOUNTER (OUTPATIENT)
Dept: PHYSICAL THERAPY | Age: 60
Discharge: HOME | End: 2020-05-06
Payer: COMMERCIAL

## 2020-05-06 PROCEDURE — 97110 THERAPEUTIC EXERCISES: CPT

## 2020-05-06 NOTE — PROGRESS NOTES
Carolyn Moya  : 1960  Payor: EBER [facility], [address] [place] [address] / Plan: [place] EBER Lara. / Product Type: Commerical /  [address] [place]  at [facility] DULCE ANGUIANO  [address] [facility], [address], [place] [address].  [phone]   [phone]       OUTPATIENT PHYSICAL THERAPY: Daily Treatment Note 2020  Visit Count: 22     ICD-10: Treatment Diagnosis: Pain in left shoulder (M25.512); Presence of left artificial shoulder joint (B93.038)  Precautions/Allergies: Post op precautions. From EMR: Codeine   TREATMENT PLAN:  Effective Dates: 2020 TO 2020 (90 days). Frequency/Duration: 2 times a week for 90 Day(s) MEDICAL/REFERRING DIAGNOSIS:s/p L shoulder I&D, removal of [place] implant, revision reverse TSA, biceps tendonesis, lat dorsi and teres major tendon transfer  L shoulder   DATE OF ONSET:  surgery  REFERRING PHYSICIAN: Seferino Mcguire MD MD Orders: Eval and Treat; HEP; ROM; Strength; \"full motion/full strength\" (20)  Return MD Appointment: 20       Carolyn Moya was cleared by the screener to enter the clinic this morning. Pre-treatment Symptoms/Complaints: Says her shoulder was sore after last time. This eased. She did her HEP routine yesterday. Pain: Initial:   no pain now. Post Session: no complaints of pain   Medications Last Reviewed: Tylonol prn and post PT. 2720    Updated Objective Findings:   No new measure. TREATMENT:     Active Warm up on UBE x10'. Therapeutic Exercise: (50 Minutes): Exercises per grid for shoulder motion and strength. Exercise modified and resistances as indicated. Manual guiding and verbal cues and demonstration as needed. HEP: She is to continue with her HEP. She verbalizes understanding.     Date  20 Date  20 Date  20 Date  20 Date  20 Date  20   Activity/Exercise         UBE - L. 2 x10'  L. 3 x10' L. 3 x10' L. 3 x10' L. 3 x10'   Pulleys - - - - D/C    AROM - - Muscle Balance drills as above Muscle Balance drills as above Muscle Balance drills as above -   Serratus press 45-deg beach chair, manual resisted  2x10 Supine press  2# x15  3# x15 Supine  3# 1x10  4# 2x10 Supine  4# 1x10  5# 2x10 - myokin  trunk arounds to L, blue single 3x5 breath;  Supine press   5# 3x10   Shoulder Ext - Bent over  2# 2x10;  Standing cable  7# 3x10   Standing cable  7# x15  10# 2x10 Standing  10# cable 3x10 - bilat cable pull down, underhand  13# x15  17# x15  20# x10   Middle trapezius - Bent over  2# 3x10` Bent over, bilat  3# 3x10 Bent over, bilat  3# 3x10 - -   Lower trapezius - Bent over  2# 3x10 - - - -   Scapular retractions - Bent Row  2# 1x15  3# 1x15;    Lat pull down, underhand, bilat  10# 1x15  13# 1x15     - Bent row  5# x10  7# x10  8# x10 - Bent Row  8# x15 ea  9# x15  10# x15   Shoulder ER - - - Propped in plane of scap, eccentric  0# x10  1# 2x10 Standing,   by side  Yellow single  10\" hold x10 with assist;  supported at 90-deg scaption  Yellow  10\"x10 with assist Standing   by side with forward reach combo  Yellow single 3x5-6   Forward Elevation 45-deg beach chair, AROM x10, manual resisted ant deltoid conc/ecc  3x10; straight arm scaption man resisted 2x10;  Standing hand to top of head, 3x5;  500-g overhead lifts at wall to 12, 10, and 2 o'clock x5 ea Standing, to 90 deg  2# 2x15    Wall slide with lift off,  unilat  Yellow 3x10   - Wall slide with lift off, bilat  Yellow 2x10;  Standing forward raise to 90-deg  3# 3x10 - Standing latera to forward raise,  90-deg  3# 2x10   Abduction - Standing to 90-deg  2# 3x10 bilat   3# 3x10 bialt  3# 3x10 - -   Diagonal Flexion 45-deg beach chair, AROM x10,   manual resisted conc/ecc 2x10 ea way - - - - -   Diagonal Extension 45-deg beach chair, AROM x10,   manual resisted conc/ecc 2x10 ea way - - - - -   UE [place]     - -   Internal rotation - - - - Standing,   by side  Yellow single  10\" hold x10 with assist;  supported at 90-deg scaption  Yellow  10\"x10 with assist -   Rhythmic Stabilization Wall dribble clock, 12-6 o'clock, 500-g ball  1x30-50 rep ea;  Red flex bar forward elevation,   Scaption, abduction, D1, D2 flex, slow 2x5 each - Wall dribble, 12-6 o'clock, 500-g ball   2x30-40 ea - Red flex bar: scaption, abd, D1, D2   3x5 ea -    Closed kinetic chain--UE Quadruped  Alt UE lift x10 ea; Opposites x10 ea - Quadruped opposites 3x5 ea;  Quadruped to down dog x10 Quadruped opposites 1x10;   Knee push up, 1/2 range  2x5 - quadruped resp belly lift 1x5 breath; Tall plank 10\"x3     Treatment/Session Summary:    · Response to Treatment: Very good effort with the exercises. · Communication/Consultation:  None today  · Equipment provided today:  None today  · Recommendations/Intent for next treatment session: We will continue with controlled shoulder re-strengthening.      Total Treatment Billable Duration: 50 minutes  PT Patient Time In/Time Out  Time In: 7893  Time Out: 464 Jean-Paul [facility], PT    Future Appointments   Date Time Provider Asuncion Mccormick   5/11/2020  9:00 AM Wilson Bers, Retia Paget, PT [facility]   5/13/2020  9:00 AM Zheng Goodman PT [facility]   5/18/2020  9:00 AM Zheng Goodman, PT [facility]   5/20/2020  9:00 AM Jerirwin Lalion, PT [facility]

## 2020-05-11 ENCOUNTER — HOSPITAL ENCOUNTER (OUTPATIENT)
Dept: PHYSICAL THERAPY | Age: 60
Discharge: HOME | End: 2020-05-11
Payer: COMMERCIAL

## 2020-05-11 PROCEDURE — 97110 THERAPEUTIC EXERCISES: CPT

## 2020-05-11 NOTE — PROGRESS NOTES
Naawf Coronado  : 1960  Payor: [employer], [address] [place] [address] / Plan: [place] [employer] / Product Type: Commerical /  [address] [place]  at [facility] DULCE ANGUIANO  [address] [place] [address], [address], [address] [facility]  [phone]   [phone]       OUTPATIENT PHYSICAL THERAPY: Daily Treatment Note 2020  Visit Count: 23     ICD-10: Treatment Diagnosis: Pain in left shoulder (M25.512); Presence of left artificial shoulder joint (H83.343)  Precautions/Allergies: Post op precautions. From EMR: Codeine   TREATMENT PLAN:  Effective Dates: 2020 TO 2020 (90 days). Frequency/Duration: 2 times a week for 90 Day(s) MEDICAL/REFERRING DIAGNOSIS:s/p L shoulder I&D, removal of [place] implant, revision reverse TSA, biceps tendonesis, lat dorsi and teres major tendon transfer  L shoulder   DATE OF ONSET:  surgery  REFERRING PHYSICIAN: Naveen Mcguire MD MD Orders: Eval and Treat; HEP; ROM; Strength; \"full motion/full strength\" (20)  Return MD Appointment: 20       Nawaf Coronado was cleared by the screener to enter the clinic this morning. Pre-treatment Symptoms/Complaints: Says her shoulder has been quite sore since Thursday. Had to take a pain pill one time. Did not do too much with it or using it over the weekend. Still sore but better. Pain: Initial:   no pain now. Post Session: no complaints of pain   Medications Last Reviewed: Tylonol. 1120    Updated Objective Findings:   Palpation: Tender to L deltoid. ROM: Not measured. TREATMENT:     Active Warm up on UBE x10'. Therapeutic Exercise: (45 Minutes): Exercises for shoulder motion in supine with PROM, assisted stretching, 3\"x10 each way with light overpressure.  Muscle activation and stabilization holds with manual resisted alternating isometrics and rhythmic stabilization in supine 0-deg ER in shoulder neutral, 45, and 80 deg abd, supine 100-deg flexion, 45-deg incline 130-deg flex, and 90-deg at 90-deg flex as per grid. Manual resisted as per grid. AROM to D1 and D2 guiding and verbal cues and demonstration as needed. HEP: She is to continue with her HEP. She verbalizes understanding.       Date  4/20/20 Date  4/22/20 Date  4/27/20 Date  4/29/20 Date  5/4/20 Date  5/6/20 Date  5/11/20   Activity/Exercise          UBE - L. 2 x10'  L. 3 x10' L. 3 x10' L. 3 x10' L. 3 x10' L.3 x10'   Pulleys - - - - D/C     AROM - - Muscle Balance drills as above Muscle Balance drills as above Muscle Balance drills as above - As above   Serratus press 45-deg beach chair, manual resisted  2x10 Supine press  2# x15  3# x15 Supine  3# 1x10  4# 2x10 Supine  4# 1x10  5# 2x10 - myokin  trunk arounds to L, blue single 3x5 breath;  Supine press   5# 3x10 Supine press with manual abd/ER and add/IR   3x10 ea   Shoulder Ext - Bent over  2# 2x10;  Standing cable  7# 3x10   Standing cable  7# x15  10# 2x10 Standing  10# cable 3x10 - bilat cable pull down, underhand  13# x15  17# x15  20# x10 -   Middle trapezius - Bent over  2# 3x10` Bent over, bilat  3# 3x10 Bent over, bilat  3# 3x10 - - -   Lower trapezius - Bent over  2# 3x10 - - - - -   Scapular retractions - Bent Row  2# 1x15  3# 1x15;    Lat pull down, underhand, bilat  10# 1x15  13# 1x15     - Bent row  5# x10  7# x10  8# x10 - Bent Row  8# x15 ea  9# x15  10# x15 -   Shoulder ER - - - Propped in plane of scap, eccentric  0# x10  1# 2x10 Standing,   by side  Yellow single  10\" hold x10 with assist;  supported at 90-deg scaption  Yellow  10\"x10 with assist Standing   by side with forward reach combo  Yellow single 3x5-6 ER and IR  Supine   0-deg abd  X10;  ER at 90-dge scaption 45-deg incline  1x10   Forward Elevation 45-deg beach chair, AROM x10, manual resisted ant deltoid conc/ecc  3x10; straight arm scaption man resisted 2x10;  Standing hand to top of head, 3x5;  500-g overhead lifts at wall to 12, 10, and 2 o'clock x5 ea Standing, to 90 deg  2# 2x15    Wall slide with lift off,  unilat  Yellow 3x10   - Wall slide with lift off, bilat  Yellow 2x10;  Standing forward raise to 90-deg  3# 3x10 - Standing latera to forward raise,  90-deg  3# 2x10 -   Abduction - Standing to 90-deg  2# 3x10 bilat   3# 3x10 bialt  3# 3x10 - - -   Diagonal Flexion 45-deg beach chair, AROM x10,   manual resisted conc/ecc 2x10 ea way - - - - - manual resisted  Supine  D1, D2  3x10 ea;  45-deg incline  D1, D2  3x10 ea   Diagonal Extension 45-deg beach chair, AROM x10,   manual resisted conc/ecc 2x10 ea way - - - - - manual resisted  Supine  D1, D2  2x10 ea   UE [place]     - - DC   Internal rotation - - - - Standing,   by side  Yellow single  10\" hold x10 with assist;  supported at 90-deg scaption  Yellow  10\"x10 with assist -    Rhythmic Stabilization Wall dribble clock, 12-6 o'clock, 500-g ball  1x30-50 rep ea;  Red flex bar forward elevation,   Scaption, abduction, D1, D2 flex, slow 2x5 each - Wall dribble, 12-6 o'clock, 500-g ball   2x30-40 ea - Red flex bar: scaption, abd, D1, D2   3x5 ea -  Manual resisted as above   Closed kinetic chain--UE Quadruped  Alt UE lift x10 ea; Opposites x10 ea - Quadruped opposites 3x5 ea;  Quadruped to down dog x10 Quadruped opposites 1x10;   Knee push up, 1/2 range  2x5 - quadruped resp belly lift 1x5 breath; Tall plank 10\"x3 -     Treatment/Session Summary:    · Response to Treatment: Increased soreness noted after last session. May be from the exercise load. Today, did less overall load with emphasis on stabilization and humeral rotation control. Fatigue after. · Communication/Consultation:  None today  · Equipment provided today:  None today  · Recommendations/Intent for next treatment session: We will continue with controlled shoulder re-strengthening.      Total Treatment Billable Duration: 45 minutes  PT Patient Time In/Time Out  Time In: 0900  Time Out: Panda Rodrigues PT    Future Appointments   Date Time Provider Asuncion Mccormick   5/13/2020  9:00 AM Ana Paula Crouch, PT [facility]   5/18/2020  9:00 AM Allyson Alvarez, PT [facility]   5/20/2020  9:00 AM Ana Paula Crouch, PT [facility]

## 2020-05-13 ENCOUNTER — HOSPITAL ENCOUNTER (OUTPATIENT)
Dept: PHYSICAL THERAPY | Age: 60
Discharge: HOME | End: 2020-05-13
Payer: COMMERCIAL

## 2020-05-13 PROCEDURE — 97110 THERAPEUTIC EXERCISES: CPT

## 2020-05-13 NOTE — PROGRESS NOTES
Zoe Schrader  : 1960  Payor: [employer], [address] [place] [address] / Plan: [place] [employer] / Product Type: Commtim /  [address] [place]  at [address] [place] [address] Rd  [address] [facility], [address], [address] [place] [address]  [phone]   [phone]       OUTPATIENT PHYSICAL THERAPY: Daily Treatment Note 2020  Visit Count: 24     ICD-10: Treatment Diagnosis: Pain in left shoulder (M25.512); Presence of left artificial shoulder joint (T69.760)  Precautions/Allergies: Post op precautions. From EMR: Codeine   TREATMENT PLAN:  Effective Dates: 2020 TO 2020 (90 days). Frequency/Duration: 2 times a week for 90 Day(s) MEDICAL/REFERRING DIAGNOSIS:s/p L shoulder I&D, removal of [place] implant, revision reverse TSA, biceps tendonesis, lat dorsi and teres major tendon transfer  L shoulder   DATE OF ONSET:  surgery  REFERRING PHYSICIAN: Sharita Mcguire MD MD Orders: Eval and Treat; HEP; ROM; Strength; \"full motion/full strength\" (20)  Return MD Appointment: 20       Zoe Schrader was cleared by the screener to enter the clinic this morning. Pre-treatment Symptoms/Complaints: Says her shoulder and arm are feeling much better now. Pain: Initial:   no pain now. Post Session: no complaints of pain   Medications Last Reviewed: Tylonol. 1120    Updated Objective Findings:   ROM: Not measured. TREATMENT:     Active Warm up on UBE x10'. Therapeutic Exercise: (50 Minutes): Exercises for shoulder AROM with wall slides 4-ways. Exercises for shoulder and shoulder girdle strength and stabilization as per grid. Exercises modified as indicated. Manual guiding and assist for humeral rotation with supine and incline pressing. HEP: She is to continue with her HEP. She verbalizes understanding.     Date  20 Date  20 Date  20 Date  20 Date  20 Date  20 Date  20 Date  20   Activity/Exercise UBE - L. 2 x10'  L. 3 x10' L. 3 x10' L. 3 x10' L. 3 x10' L.3 x10' L. 3 x10'   Pulleys - - - - D/C      AROM - - Muscle Balance drills as above Muscle Balance drills as above Muscle Balance drills as above - As above 4-way Wall slide 1x15 ea   Serratus press 45-deg beach chair, manual resisted  2x10 Supine press  2# x15  3# x15 Supine  3# 1x10  4# 2x10 Supine  4# 1x10  5# 2x10 - myokin  trunk arounds to L, blue single 3x5 breath;  Supine press   5# 3x10 Supine press with manual abd/ER and add/IR   3x10 ea Supine  5# 3x10;  60-deg incliine  2# 3x10 AAROM   Shoulder Ext - Bent over  2# 2x10;  Standing cable  7# 3x10   Standing cable  7# x15  10# 2x10 Standing  10# cable 3x10 - bilat cable pull down, underhand  13# x15  17# x15  20# x10 - Standing Ext/add  7# x15  10# 2x10;  Pull down, underhand  20# 3x10   Middle trapezius - Bent over  2# 3x10` Bent over, bilat  3# 3x10 Bent over, bilat  3# 3x10 - - - Bent over  3# 3x10 ea   Lower trapezius - Bent over  2# 3x10 - - - - - Bent over 3# 3x10 ea   Scapular retractions - Bent Row  2# 1x15  3# 1x15;    Lat pull down, underhand, bilat  10# 1x15  13# 1x15     - Bent row  5# x10  7# x10  8# x10 - Bent Row  8# x15 ea  9# x15  10# x15 - Bent row  10# 3   Shoulder ER - - - Propped in plane of scap, eccentric  0# x10  1# 2x10 Standing,   by side  Yellow single  10\" hold x10 with assist;  supported at 90-deg scaption  Yellow  10\"x10 with assist Standing   by side with forward reach combo  Yellow single 3x5-6 ER and IR  Supine   0-deg abd  X10;  ER at 90-dge scaption 45-deg incline  1x10 -   Forward Elevation 45-deg beach chair, AROM x10, manual resisted ant deltoid conc/ecc  3x10; straight arm scaption man resisted 2x10;  Standing hand to top of head, 3x5;  500-g overhead lifts at wall to 12, 10, and 2 o'clock x5 ea Standing, to 90 deg  2# 2x15    Wall slide with lift off,  unilat  Yellow 3x10   - Wall slide with lift off, bilat  Yellow 2x10;  Standing forward raise to 90-deg  3# 3x10 - Standing latera to forward raise,  90-deg  3# 2x10 - Standing latera to forward raise,  90-deg  3# 3x10   Abduction - Standing to 90-deg  2# 3x10 bilat   3# 3x10 bialt  3# 3x10 - - - -   Diagonal Flexion 45-deg beach chair, AROM x10,   manual resisted conc/ecc 2x10 ea way - - - - - manual resisted  Supine  D1, D2  3x10 ea;  45-deg incline  D1, D2  3x10 ea -   Diagonal Extension 45-deg beach chair, AROM x10,   manual resisted conc/ecc 2x10 ea way - - - - - manual resisted  Supine  D1, D2  2x10 ea -   UE [place]     - - DC    Internal rotation - - - - Standing,   by side  Yellow single  10\" hold x10 with assist;  supported at 90-deg scaption  Yellow  10\"x10 with assist -     Rhythmic Stabilization Wall dribble clock, 12-6 o'clock, 500-g ball  1x30-50 rep ea;  Red flex bar forward elevation,   Scaption, abduction, D1, D2 flex, slow 2x5 each - Wall dribble, 12-6 o'clock, 500-g ball   2x30-40 ea - Red flex bar: scaption, abd, D1, D2   3x5 ea -  Manual resisted as above Wall dribble, 12-6 o'clock,  500-g  1x50 ea   Closed kinetic chain--UE Quadruped  Alt UE lift x10 ea; Opposites x10 ea - Quadruped opposites 3x5 ea;  Quadruped to down dog x10 Quadruped opposites 1x10;   Knee push up, 1/2 range  2x5 - quadruped resp belly lift 1x5 breath; Tall plank 10\"x3 - -     Treatment/Session Summary:    · Response to Treatment: Very good performance of the strength and stabilization exercises. Manual assist for rotational control with supine and incline presses. Soreness is better. · Communication/Consultation:  None today  · Equipment provided today:  None today  · Recommendations/Intent for next treatment session: We will continue with controlled shoulder re-strengthening.      Total Treatment Billable Duration: 50 minutes  PT Patient Time In/Time Out  Time In: 0900  Time Out: Jacoby Velazquez 47, PT    Future Appointments   Date Time Provider Asuncion Anny   5/18/2020  9:00 AM Albina Grant, Harvie Goodpasture, PT [facility]   5/20/2020  9:00 AM Paul Delgado PT [facility]

## 2020-05-18 ENCOUNTER — HOSPITAL ENCOUNTER (OUTPATIENT)
Dept: PHYSICAL THERAPY | Age: 60
Discharge: HOME | End: 2020-05-18
Payer: COMMERCIAL

## 2020-05-18 PROCEDURE — 97110 THERAPEUTIC EXERCISES: CPT

## 2020-05-18 NOTE — PROGRESS NOTES
Quinton Cornell  : 1960  Payor: [employer], [address] [place] [address] / Plan: [place] [employer] / Product Type: Commtim /  [address]  at [address] [place] [address] Rd  [facility], [address], [address] [place] [address]  [phone]   [phone]       OUTPATIENT PHYSICAL THERAPY: Daily Treatment Note 2020  Visit Count: 25     ICD-10: Treatment Diagnosis: Pain in left shoulder (M25.512); Presence of left artificial shoulder joint (G82.049)  Precautions/Allergies: Post op precautions. From EMR: Codeine   TREATMENT PLAN:  Effective Dates: 2020 TO 2020 (90 days). Frequency/Duration: 2 times a week for 90 Day(s) MEDICAL/REFERRING DIAGNOSIS:s/p L shoulder I&D, removal of [place] implant, revision reverse TSA, biceps tendonesis, lat dorsi and teres major tendon transfer  L shoulder   DATE OF ONSET:  surgery  REFERRING PHYSICIAN: Lindsey Mcguire MD MD Orders: Eval and Treat; HEP; ROM; Strength; \"full motion/full strength\" (20)  Return MD Appointment: 20       Quinton Cornell was cleared by the screener to enter the clinic this morning. Pre-treatment Symptoms/Complaints: Says her shoulder is doing \"good\". Did work on her HEP since last session. No issues since then. Pain: Initial:   no pain now. Post Session: no complaints of pain   Medications Last Reviewed: Tylonol. 20    Updated Objective Findings:   ROM: Not measured. TREATMENT:     Active Warm up on UBE x10' with alternating double arm and single arm each minute'. Therapeutic Exercise: (45 Minutes): Exercises for shoulder girdle muscle chain facilitation with quadruped respiratory belly lift with B UE and L only, x5 breath each. Exercises for shoulder and shoulder girdle strength and stabilization as per grid. Exercises modified as indicated. Manual resisted alternating isometrics and rhythmic stabilization as indicated.    HEP: She is to continue with her HEP. She verbalizes understanding.     Date  4/20/20 Date  4/22/20 Date  4/27/20 Date  4/29/20 Date  5/4/20 Date  5/6/20 Date  5/11/20 Date  5/13/20 Date  5/18/20   Activity/Exercise            UBE - L. 2 x10'  L. 3 x10' L. 3 x10' L. 3 x10' L. 3 x10' L.3 x10' L. 3 x10' L. 3 x10'   Pulleys - - - - D/C    -   AROM - - Muscle Balance drills as above Muscle Balance drills as above Muscle Balance drills as above - As above 4-way Wall slide 1x15 ea -   Serratus press 45-deg beach chair, manual resisted  2x10 Supine press  2# x15  3# x15 Supine  3# 1x10  4# 2x10 Supine  4# 1x10  5# 2x10 - myokin  trunk arounds to L, blue single 3x5 breath;  Supine press   5# 3x10 Supine press with manual abd/ER and add/IR   3x10 ea Supine  5# 3x10;  60-deg incliine  2# 3x10 AAROM Supine with alt iso to rhyth stab  3# x3   Shoulder Ext - Bent over  2# 2x10;  Standing cable  7# 3x10   Standing cable  7# x15  10# 2x10 Standing  10# cable 3x10 - bilat cable pull down, underhand  13# x15  17# x15  20# x10 - Standing Ext/add  7# x15  10# 2x10;  Pull down, underhand  20# 3x10 -   Middle trapezius - Bent over  2# 3x10` Bent over, bilat  3# 3x10 Bent over, bilat  3# 3x10 - - - Bent over  3# 3x10 ea -   Lower trapezius - Bent over  2# 3x10 - - - - - Bent over 3# 3x10 ea -   Scapular retractions - Bent Row  2# 1x15  3# 1x15;    Lat pull down, underhand, bilat  10# 1x15  13# 1x15     - Bent row  5# x10  7# x10  8# x10 - Bent Row  8# x15 ea  9# x15  10# x15 - Bent row  10# 3 -   Shoulder ER - - - Propped in plane of scap, eccentric  0# x10  1# 2x10 Standing,   by side  Yellow single  10\" hold x10 with assist;  supported at 90-deg scaption  Yellow  10\"x10 with assist Standing   by side with forward reach combo  Yellow single 3x5-6 ER and IR  Supine   0-deg abd  X10;  ER at 90-dge scaption 45-deg incline  1x10 - Standing manual resisted alt iso in neutral  3x20-30\"     Forward Elevation 45-deg beach chair, AROM x10, manual resisted ant deltoid conc/ecc  3x10; straight arm scaption man resisted 2x10;  Standing hand to top of head, 3x5;  500-g overhead lifts at wall to 12, 10, and 2 o'clock x5 ea Standing, to 90 deg  2# 2x15    Wall slide with lift off,  unilat  Yellow 3x10   - Wall slide with lift off, bilat  Yellow 2x10;  Standing forward raise to 90-deg  3# 3x10 - Standing latera to forward raise,  90-deg  3# 2x10 - Standing latera to forward raise,  90-deg  3# 3x10 Standing   90-deg iso hold with alt iso/rhythm stab  x3   Abduction - Standing to 90-deg  2# 3x10 bilat   3# 3x10 bialt  3# 3x10 - - - - -   Diagonal Flexion 45-deg beach chair, AROM x10,   manual resisted conc/ecc 2x10 ea way - - - - - manual resisted  Supine  D1, D2  3x10 ea;  45-deg incline  D1, D2  3x10 ea - 45-deg beach chair AROM x10,  Man RAROM conc 2x10,   Conc/ecc 1x10 ea;  Standing D1 man resisted 3x10   Diagonal Extension 45-deg beach chair, AROM x10,   manual resisted conc/ecc 2x10 ea way - - - - - manual resisted  Supine  D1, D2  2x10 ea - 45-deg beach chair AROM x10,  Man RAROM conc 2x10,   Conc/ecc 1x10 ea   UE [place]     - - DC     Internal rotation - - - - Standing,   by side  Yellow single  10\" hold x10 with assist;  supported at 90-deg scaption  Yellow  10\"x10 with assist -   -   Rhythmic Stabilization Wall dribble clock, 12-6 o'clock, 500-g ball  1x30-50 rep ea;  Red flex bar forward elevation,   Scaption, abduction, D1, D2 flex, slow 2x5 each - Wall dribble, 12-6 o'clock, 500-g ball   2x30-40 ea - Red flex bar: scaption, abd, D1, D2   3x5 ea -  Manual resisted as above Wall dribble, 12-6 o'clock,  500-g  1x50 ea Quadruped, manual ressted  Alt iso front/backand lat x10 ea, and to L UE   Closed kinetic chain--UE Quadruped  Alt UE lift x10 ea; Opposites x10 ea - Quadruped opposites 3x5 ea;  Quadruped to down dog x10 Quadruped opposites 1x10;   Knee push up, 1/2 range  2x5 - quadruped resp belly lift 1x5 breath;   Tall plank 10\"x3 - - Tall plank on knees  Shoulder tap x10 Treatment/Session Summary:    · Response to Treatment: Very good performance of the strength and stabilization exercises. · Communication/Consultation:  None today  · Equipment provided today:  None today  · Recommendations/Intent for next treatment session: We will continue with controlled shoulder re-strengthening.      Total Treatment Billable Duration: 45 minutes  PT Patient Time In/Time Out  Time In: 8947  Time Out: 1200 [facility], PT    Future Appointments   Date Time Provider Asuncion Mccormick   5/20/2020  9:00 AM Nohelia Pena, [facility]   6/1/2020 10:00 AM DAMEON Chavez   [facility]2020 [facility]

## 2020-05-20 ENCOUNTER — HOSPITAL ENCOUNTER (OUTPATIENT)
Dept: PHYSICAL THERAPY | Age: 60
Discharge: HOME | End: 2020-05-20
Payer: COMMERCIAL

## 2020-05-20 PROCEDURE — 97110 THERAPEUTIC EXERCISES: CPT

## 2020-05-20 NOTE — PROGRESS NOTES
Val Castleman  : 1960  Payor: EBER [facility], [address] [place] [address] / Plan: [place] PLANNED Nikunj Hernandez. / Product Type: Commerical /  [address]  at [facility] DULCE ANGUIANO  [address] [facility], [address], [place] [address].  [phone]   [phone]       OUTPATIENT PHYSICAL THERAPY: Daily Treatment Note 2020  Visit Count: 26     ICD-10: Treatment Diagnosis: Pain in left shoulder (M25.512); Presence of left artificial shoulder joint (E84.669)  Precautions/Allergies: Post op precautions. From EMR: Codeine   TREATMENT PLAN:  Effective Dates: 2020 TO 2020 (90 days). Frequency/Duration: 2 times a week for 90 Day(s) MEDICAL/REFERRING DIAGNOSIS:s/p L shoulder I&D, removal of [place] implant, revision reverse TSA, biceps tendonesis, lat dorsi and teres major tendon transfer  L shoulder   DATE OF ONSET:  surgery  REFERRING PHYSICIAN: Bassem Mcguire MD MD Orders: Eval and Treat; HEP; ROM; Strength; \"full motion/full strength\" (20)  Return MD Appointment: 20       Val Castleman was cleared by the screener to enter the clinic this morning. Pre-treatment Symptoms/Complaints: Says her she was a little sore after last time. Iced it at home. It is better. oulder is doing \"good\". Pain: Initial:   no pain now. Post Session: no complaints of pain   Medications Last Reviewed: Tylonol. 20    Updated Objective Findings:   ROM: Not measured. TREATMENT:     Active Warm up on UBE x10' with alternating double arm and single arm each minute. Therapeutic Exercise: (40 Minutes): Exercises for shoulder and shoulder girdle strength and stabilization as per grid. Exercises modified as indicated. Manual resisted alternating isometrics and rhythmic stabilization as indicated. HEP: She is to continue with her HEP. She verbalizes understanding.     Date  20 Date  20 Date  20 Date  20 Date  20 Date  20 Date  5/11/20 Date  5/13/20 Date  5/18/20 Date  5/20/20   Activity/Exercise             UBE - L. 2 x10'  L. 3 x10' L. 3 x10' L. 3 x10' L. 3 x10' L.3 x10' L. 3 x10' L. 3 x10' L.3 x10'   Pulleys - - - - D/C    - -   AROM - - Muscle Balance drills as above Muscle Balance drills as above Muscle Balance drills as above - As above 4-way Wall slide 1x15 ea - -   Serratus press 45-deg beach chair, manual resisted  2x10 Supine press  2# x15  3# x15 Supine  3# 1x10  4# 2x10 Supine  4# 1x10  5# 2x10 - myokin  trunk arounds to L, blue single 3x5 breath;  Supine press   5# 3x10 Supine press with manual abd/ER and add/IR   3x10 ea Supine  5# 3x10;  60-deg incliine  2# 3x10 AAROM Supine with alt iso to rhyth stab  3# x3 Standing dynamic hug  Blue 3x10   Shoulder Ext - Bent over  2# 2x10;  Standing cable  7# 3x10   Standing cable  7# x15  10# 2x10 Standing  10# cable 3x10 - bilat cable pull down, underhand  13# x15  17# x15  20# x10 - Standing Ext/add  7# x15  10# 2x10;  Pull down, underhand  20# 3x10 - Sitting reverse ext  7# cable  2x10;  bilat cable pull down, underhand  13# x10  20# 2x10   Middle trapezius - Bent over  2# 3x10` Bent over, bilat  3# 3x10 Bent over, bilat  3# 3x10 - - - Bent over  3# 3x10 ea - Bent over   4# 2x10   Lower trapezius - Bent over  2# 3x10 - - - - - Bent over 3# 3x10 ea - Bent over   4# 2x10   Scapular retractions - Bent Row  2# 1x15  3# 1x15;    Lat pull down, underhand, bilat  10# 1x15  13# 1x15     - Bent row  5# x10  7# x10  8# x10 - Bent Row  8# x15 ea  9# x15  10# x15 - Bent row  10# 3 - Bent row  10# 3x10   Shoulder ER - - - Propped in plane of scap, eccentric  0# x10  1# 2x10 Standing,   by side  Yellow single  10\" hold x10 with assist;  supported at 90-deg scaption  Yellow  10\"x10 with assist Standing   by side with forward reach combo  Yellow single 3x5-6 ER and IR  Supine   0-deg abd  X10;  ER at 90-dge scaption 45-deg incline  1x10 - Standing manual resisted alt iso in neutral  3x20-30\" -   Forward Elevation 45-deg beach chair, AROM x10, manual resisted ant deltoid conc/ecc  3x10; straight arm scaption man resisted 2x10;  Standing hand to top of head, 3x5;  500-g overhead lifts at wall to 12, 10, and 2 o'clock x5 ea Standing, to 90 deg  2# 2x15    Wall slide with lift off,  unilat  Yellow 3x10   - Wall slide with lift off, bilat  Yellow 2x10;  Standing forward raise to 90-deg  3# 3x10 - Standing latera to forward raise,  90-deg  3# 2x10 - Standing latera to forward raise,  90-deg  3# 3x10 Standing   90-deg iso hold with alt iso/rhythm stab  x3 Standing to  90-deg  4# 2x10;  Scaption wall slide with lift off  Red 2x10   Abduction - Standing to 90-deg  2# 3x10 bilat   3# 3x10 bialt  3# 3x10 - - - - - Standing to 90-deg  4# 2x10   Diagonal Flexion 45-deg beach chair, AROM x10,   manual resisted conc/ecc 2x10 ea way - - - - - manual resisted  Supine  D1, D2  3x10 ea;  45-deg incline  D1, D2  3x10 ea - 45-deg beach chair AROM x10,  Man RAROM conc 2x10,   Conc/ecc 1x10 ea;  Standing D1 man resisted 3x10 D1 standing  Red 2x10   Diagonal Extension 45-deg beach chair, AROM x10,   manual resisted conc/ecc 2x10 ea way - - - - - manual resisted  Supine  D1, D2  2x10 ea - 45-deg beach chair AROM x10,  Man RAROM conc 2x10,   Conc/ecc 1x10 ea -   UE [place]     - - DC      Internal rotation - - - - Standing,   by side  Yellow single  10\" hold x10 with assist;  supported at 90-deg scaption  Yellow  10\"x10 with assist -   - -   Rhythmic Stabilization Wall dribble clock, 12-6 o'clock, 500-g ball  1x30-50 rep ea;  Red flex bar forward elevation,   Scaption, abduction, D1, D2 flex, slow 2x5 each - Wall dribble, 12-6 o'clock, 500-g ball   2x30-40 ea - Red flex bar: scaption, abd, D1, D2   3x5 ea -  Manual resisted as above Wall dribble, 12-6 o'clock,  500-g  1x50 ea Quadruped, manual ressted  Alt iso front/backand lat x10 ea, and to L UE Red flex bar   Standing scaption x5,   abd x5,  horiz  abd/add x5   Closed kinetic chain--UE Quadruped  Alt UE lift x10 ea; Opposites x10 ea - Quadruped opposites 3x5 ea;  Quadruped to down dog x10 Quadruped opposites 1x10;   Knee push up, 1/2 range  2x5 - quadruped resp belly lift 1x5 breath; Tall plank 10\"x3 - - Tall plank on knees  Shoulder tap x10 Quadruped to down dog 2x10     Treatment/Session Summary:    · Response to Treatment: Very good performance of the strength and stabilization exercises. · Communication/Consultation:  None today  · Equipment provided today:  None today  · Recommendations/Intent for next treatment session: We will continue with controlled shoulder re-strengthening the week after next. She is out of town next week.      Total Treatment Billable Duration: 40 minutes  PT Patient Time In/Time Out  Time In: 9666  Time Out: Panda Rodrigues PT    Future Appointments   Date Time Provider Asuncion Mccormick   6/1/2020 10:00 AM Jeovanny Graves, Ivis Musa, PT [facility]   6/3/2020 10:00 AM Joao Jhaveri, PT [facility]

## 2020-05-27 ENCOUNTER — APPOINTMENT (OUTPATIENT)
Dept: PHYSICAL THERAPY | Age: 60
End: 2020-05-27

## 2020-06-01 ENCOUNTER — HOSPITAL ENCOUNTER (OUTPATIENT)
Dept: PHYSICAL THERAPY | Age: 60
Discharge: HOME | End: 2020-06-01
Payer: COMMERCIAL

## 2020-06-01 PROCEDURE — 97110 THERAPEUTIC EXERCISES: CPT

## 2020-06-01 NOTE — THERAPY RECERTIFICATION
Bautista President  : 1960  Primary: [place] Planned Administrators, In*  Secondary:  [address]  at [facility] DULCE ANGUIANO  [address] [facility], [address] [place] [address],[address], [facility] [address].  [phone]   [phone]       OUTPATIENT PHYSICAL [address] 1912     ICD-10: Treatment Diagnosis: Pain in left shoulder (M25.512); Presence of left artificial shoulder joint (U87.629)  Precautions/Allergies: Post op precautions. From EMR: Codeine   TREATMENT PLAN:  Effective Dates: 2020 TO 2020 (30 days). Frequency/Duration: 2 times a week for 30 additional Day(s) MEDICAL/REFERRING DIAGNOSIS:s/p L shoulder I&D, removal of [place] implant, revision reverse TSA, biceps tendonesis, lat dorsi and teres major tendon transfer  L shoulder   DATE OF ONSET: 20 surgery  REFERRING PHYSICIAN: Carlita Mcguire MD MD Orders: Eval and Treat; HEP; ROM; Strength; \"full motion/full strength\" (20)  Return MD Appointment: 55     RE-CERTIFICATION ASSESSMENT:  Ms. Susan Gonzalez has attended 27 visits to date. She is now nearly 3 months s/p Incision, debridement, removal of implant, left shoulder \"Dahiana\" reverse left total shoulder arthroplasty with a proximal humeral osteotomy and revision reverse L total shoulder arthroplasty with a long stem Delta Xtend prosthesis, biceps tenodesis, latissimus dorsi and teres major tendon transfer. She is making excellent progress. She does not have pain complaints. She has good shoulder ROM both actively and passively. Strength is progressing for functional ranges and use. Rotational control and anterior deltoid weakness limits lifting and controlling items of weight in the L hand. She is doing well with the strength PRE's. Functionally, she reports improved use of her L UE with normal daily activities, but her DASH score is status quo. She is progressing toward her discharge goals. She will benefit from continued PT for controlled, progressive re-strengthening. PROBLEM LIST (Impacting functional limitations):   1. Weakness L shoulder INTERVENTIONS PLANNED: (Treatment may consist of any combination of the following)   1. Therapeutic exercises and HEP for strength     GOALS: (Goals have been discussed and agreed upon with patient.)  Short-Term Functional Goals: Time Frame:   1. Report no more than 4/10 intermittent pain to L shoulder with compensatory use during basic functional activities, and score less than 40% on the DASH. MET 4/6/2020  2. L shoulder PROM forward elevation greater than 150 degrees to progress functional ranges. Met, 4/22/20  3. Demonstrate good L shoulder/deltoid isometric strength with manual testing to progress into strength phase. MET 4/6/20  4. Independent with initial HEP. Met, 4/22/20  Discharge Goals: Time Frame: 10 weeks  1. No more than 3/10 intermittent pain L shoulder with return to normalized household and work activities, and score less than 25% on the DASH. Met for pain, ongoing for DASH 6/1/20  2. L shoulder AROM forward elevation greater than 135 degrees for use with normalized ADL and work activities. MET 4/6/20  3. Demonstrate good functional shoulder strength and endurance for return to normalized household and work activities. Ongoing, progressing 6/1/20  4. Independent with advanced shoulder HEP for continued self-management.  Ongoing, progressing 6/1/20    Updated Objective Findings:   ROM:   LUE AROM  L Shoulder Flexion: 160(forward elevation)  L Shoulder ABduction: 160  L Shoulder Internal Rotation: (to T7 finger walk up back)                 Strength:    L Shoulder: Flex=4+, Abd=5, Ext=5, ER 4, IR=3+   L Elbow, Forearm, wrist and hand grossly 5 with manual testing           OUTCOME MEASURE:   Tool Used: Disabilities of the Arm, Shoulder and Hand (DASH) Questionnaire - Quick Version  Score:  Initial: 45/55 or 77% disability 28/55 or 39% limited  (Date: 4/6/20 ) 26/55 or 34% disability (4/22/20) Most Recent: 26/55 or 34% disability (6/1/20)   Interpretation of Score: The DASH is designed to measure the activities of daily living in person's with upper extremity dysfunction or pain.  Each section is scored on a 1-5 scale, 5 representing the greatest disability.  The scores of each section are added together for a total score of 55.  This number is divided by 11, followed by subtracting 1 and multiplying by 25 to get a percent score of disability. This value represents the percentage disability: 0-20% minimal disability; 20-40% moderate disability; 40-60% severe disability; % dependent for care or exaggerated symptom behavior.  Minimal detectable change is 12%. MEDICAL NECESSITY:   · Patient is expected to demonstrate progress in strength and range of motion to progress to funcitonal use of her L/non-dominant UE. · 3 months post op revision reverse TSA  · Current impairments limiting use of L/non-dominant UE  REASON FOR SERVICES/OTHER COMMENTS:  · Patient continues to require skilled intervention due to the complexity of the surgical procedure and need for safe, skilled progressive post op rehab to return to functinoal use of the L/non-dominat UE. Rehabilitation Potential For Stated Goals: Good to Excellent  Regarding Dominik Francis's therapy, I certify that the treatment plan above will be carried out by a therapist or under their direction.   Thank you for this referral,    Yunier Ram, PT, MSPT, OCS       Referring Physician Signature: Judy Waters NP _______________________________ Date _____________

## 2020-06-01 NOTE — PROGRESS NOTES
Anjelica Badillo  : 1960  Payor: EBER HERRERA, [address] [place] [address] / Plan: [place] EBER Adkins. / Product Type: Commerical /  [address]  at [facility] DULCE ANGUIANO  [address] [facility], [address], [place] [address].  [phone]   [phone]       OUTPATIENT PHYSICAL THERAPY: Daily Treatment Note 2020  Visit Count: 27     ICD-10: Treatment Diagnosis: Pain in left shoulder (M25.512); Presence of left artificial shoulder joint (T76.708)  Precautions/Allergies: Post op precautions. From EMR: Codeine   TREATMENT PLAN:  Effective Dates: 2020 TO 2020 (30 days). Frequency/Duration: 2 times a week for 30 additional Day(s) MEDICAL/REFERRING DIAGNOSIS:s/p L shoulder I&D, removal of [place] implant, revision reverse TSA, biceps tendonesis, lat dorsi and teres major tendon transfer  L shoulder   DATE OF ONSET: 20 surgery  REFERRING PHYSICIAN: Cox, Bufford Hamman, MD MD Orders: Eval and Treat; HEP; ROM; Strength; \"full motion/full strength\" (20)  Return MD Appointment: 20       Anjelica Badillo was cleared by the screener to enter the clinic this morning. Pre-treatment Symptoms/Complaints: Returns after being on vacation at the beach last week. Says her shoulder and arm are doing well. No issues using it, doing some of her HEP, and lifting grandchildren while away. Pain: Initial:   no pain now.   Post Session: no complaints of pain   Medications Last Reviewed: 20    Updated Objective Findings:   ROM:   LUE AROM  L Shoulder Flexion: 160(forward elevation)  L Shoulder ABduction: 160  L Shoulder Internal Rotation: (to T7 finger walk up back)                 Strength:    L Shoulder: Flex=4+, Abd=5, Ext=5, ER 4, IR=3+   L Elbow, Forearm, wrist and hand grossly 5 with manual testing           OUTCOME MEASURE:   Tool Used: Disabilities of the Arm, Shoulder and Hand (DASH) Questionnaire - Quick Version  Score:  Initial: 45/55 or 77% disability 28/55 or 39% limited  (Date: 4/6/20 ) 26/55 or 34% disability (4/22/20) Most Recent: 26/55 or 34% disability (6/1/20)   Interpretation of Score: The DASH is designed to measure the activities of daily living in person's with upper extremity dysfunction or pain.  Each section is scored on a 1-5 scale, 5 representing the greatest disability.  The scores of each section are added together for a total score of 55.  This number is divided by 11, followed by subtracting 1 and multiplying by 25 to get a percent score of disability. This value represents the percentage disability: 0-20% minimal disability; 20-40% moderate disability; 40-60% severe disability; % dependent for care or exaggerated symptom behavior.  Minimal detectable change is 12%. TREATMENT:     Active Warm up on UBE x10' with alternating double arm and single arm each minute. Therapeutic Exercise: (45 Minutes): Exercises for shoulder and shoulder girdle strength and stabilization as per grid. Exercises modified as indicated. Manual resisted alternating isometrics and rhythmic stabilization as indicated. HEP: Provided ice for shoulder post session. She is to allow rest/recover to L shoulder, but to use it normally through the day. She verbalizes understanding.     Date  4/20/20 Date  4/22/20 Date  4/27/20 Date  4/29/20 Date  5/4/20 Date  5/6/20 Date  5/11/20 Date  5/13/20 Date  5/18/20 Date  5/20/20 Date  6/1/20   Activity/Exercise              UBE - L. 2 x10'  L. 3 x10' L. 3 x10' L. 3 x10' L. 3 x10' L.3 x10' L. 3 x10' L. 3 x10' L.3 x10' L 3 x10'   Pulleys - - - - D/C    - - -   AROM - - Muscle Balance drills as above Muscle Balance drills as above Muscle Balance drills as above - As above 4-way Wall slide 1x15 ea - - -   Serratus press 45-deg beach chair, manual resisted  2x10 Supine press  2# x15  3# x15 Supine  3# 1x10  4# 2x10 Supine  4# 1x10  5# 2x10 - myokin  trunk arounds to L, blue single 3x5 breath;  Supine press   5# 3x10 Supine press with manual abd/ER and add/IR   3x10 ea Supine  5# 3x10;  60-deg incliine  2# 3x10 AAROM Supine with alt iso to rhyth stab  3# x3 Standing dynamic hug  Blue 3x10 -   Shoulder Ext - Bent over  2# 2x10;  Standing cable  7# 3x10   Standing cable  7# x15  10# 2x10 Standing  10# cable 3x10 - bilat cable pull down, underhand  13# x15  17# x15  20# x10 - Standing Ext/add  7# x15  10# 2x10;  Pull down, underhand  20# 3x10 - Sitting reverse ext  7# cable  2x10;  bilat cable pull down, underhand  13# x10  20# 2x10 -   Middle trapezius - Bent over  2# 3x10` Bent over, bilat  3# 3x10 Bent over, bilat  3# 3x10 - - - Bent over  3# 3x10 ea - Bent over   4# 2x10 -   Lower trapezius - Bent over  2# 3x10 - - - - - Bent over 3# 3x10 ea - Bent over   4# 2x10 -   Scapular retractions - Bent Row  2# 1x15  3# 1x15;    Lat pull down, underhand, bilat  10# 1x15  13# 1x15     - Bent row  5# x10  7# x10  8# x10 - Bent Row  8# x15 ea  9# x15  10# x15 - Bent row  10# 3 - Bent row  10# 3x10 [place] Row  17.5# bar  2x15 ea     Shoulder ER - - - Propped in plane of scap, eccentric  0# x10  1# 2x10 Standing,   by side  Yellow single  10\" hold x10 with assist;  supported at 90-deg scaption  Yellow  10\"x10 with assist Standing   by side with forward reach combo  Yellow single 3x5-6 ER and IR  Supine   0-deg abd  X10;  ER at 90-dge scaption 45-deg incline  1x10 - Standing manual resisted alt iso in neutral  3x20-30\"   - -   Forward Elevation 45-deg beach chair, AROM x10, manual resisted ant deltoid conc/ecc  3x10; straight arm scaption man resisted 2x10;  Standing hand to top of head, 3x5;  500-g overhead lifts at wall to 12, 10, and 2 o'clock x5 ea Standing, to 90 deg  2# 2x15    Wall slide with lift off,  unilat  Yellow 3x10   - Wall slide with lift off, bilat  Yellow 2x10;  Standing forward raise to 90-deg  3# 3x10 - Standing latera to forward raise,  90-deg  3# 2x10 - Standing latera to forward raise,  90-deg  3# 3x10 Standing   90-deg iso hold with alt iso/rhythm stab  x3 Standing to  90-deg  4# 2x10;  Scaption wall slide with lift off  Red 2x10 [place] press  15# bar  3x10;  Standing overhead modified clean  15# bar 1x5,  Clean and press  15# bar  1x10   Abduction - Standing to 90-deg  2# 3x10 bilat   3# 3x10 bialt  3# 3x10 - - - - - Standing to 90-deg  4# 2x10 Standing upright row   15# bar  1x10   Diagonal Flexion 45-deg beach chair, AROM x10,   manual resisted conc/ecc 2x10 ea way - - - - - manual resisted  Supine  D1, D2  3x10 ea;  45-deg incline  D1, D2  3x10 ea - 45-deg beach chair AROM x10,  Man RAROM conc 2x10,   Conc/ecc 1x10 ea;  Standing D1 man resisted 3x10 D1 standing  Red 2x10 D1  Single yellow  2x15;  D2   Single blue  2x15   Diagonal Extension 45-deg beach chair, AROM x10,   manual resisted conc/ecc 2x10 ea way - - - - - manual resisted  Supine  D1, D2  2x10 ea - 45-deg beach chair AROM x10,  Man RAROM conc 2x10,   Conc/ecc 1x10 ea - D1  Single blue  2x15;  D2  Single yellow  2x15   UE [place]     - - DC       Internal rotation - - - - Standing,   by side  Yellow single  10\" hold x10 with assist;  supported at 90-deg scaption  Yellow  10\"x10 with assist -   - - -   Rhythmic Stabilization Wall dribble clock, 12-6 o'clock, 500-g ball  1x30-50 rep ea;  Red flex bar forward elevation,   Scaption, abduction, D1, D2 flex, slow 2x5 each - Wall dribble, 12-6 o'clock, 500-g ball   2x30-40 ea - Red flex bar: scaption, abd, D1, D2   3x5 ea -  Manual resisted as above Wall dribble, 12-6 o'clock,  500-g  1x50 ea Quadruped, manual ressted  Alt iso front/backand lat x10 ea, and to L UE Red flex bar   Standing scaption x5,   abd x5,  horiz  abd/add x5 -   Closed kinetic chain--UE Quadruped  Alt UE lift x10 ea; Opposites x10 ea - Quadruped opposites 3x5 ea;  Quadruped to down dog x10 Quadruped opposites 1x10;   Knee push up, 1/2 range  2x5 - quadruped resp belly lift 1x5 breath;   Tall plank 10\"x3 - - Tall plank on knees  Shoulder tap x10 Quadruped to down dog 2x10 Tall plank  1x10\"; Plank on elbows   1x10\"; Side plank on knees 3x5\"; Push up plantigrade  3x5, U/LE oppositesin plantigrade 3x2 3 ea     Treatment/Session Summary:    · Response to Treatment: Very good performance of the strength and stabilization exercises. Very good active ROM and improving strength. Functionally, she is status quo on the DASH score as last administration, but reports improved functional use. · Communication/Consultation:  None today  · Equipment provided today:  None today  · Recommendations/Intent for next treatment session: We will continue with controlled shoulder re-strengthening, and prep HEP for discharge soon.      Total Treatment Billable Duration: 45 minutes  PT Patient Time In/Time Out  Time In: 5561  Time Out: [address] PT    Future Appointments   Date Time Provider Asuncion Mccormick   6/3/2020 10:00 AM Sergo Padgett, PT [facility]

## 2020-06-03 ENCOUNTER — HOSPITAL ENCOUNTER (OUTPATIENT)
Dept: PHYSICAL THERAPY | Age: 60
Discharge: HOME | End: 2020-06-03
Payer: COMMERCIAL

## 2020-06-03 PROCEDURE — 97110 THERAPEUTIC EXERCISES: CPT

## 2020-06-03 NOTE — PROGRESS NOTES
Meme Napier  : 1960  Payor: EBER [facility], [address] [place] [address] / Plan: [place] PLANNED Alyssalidia Karis. / Product Type: Commerical /  [address] [place]  at [facility] DULCE ANGUIANO  [address] [facility], [address], [place] [address].  [phone]   [phone]       OUTPATIENT PHYSICAL THERAPY: Daily Treatment Note 6/3/2020  Visit Count: 28     ICD-10: Treatment Diagnosis: Pain in left shoulder (M25.512); Presence of left artificial shoulder joint (Y35.116)  Precautions/Allergies: Post op precautions. From EMR: Codeine   TREATMENT PLAN:  Effective Dates: 2020 TO 2020 (30 days). Frequency/Duration: 2 times a week for 30 additional Day(s) MEDICAL/REFERRING DIAGNOSIS:s/p L shoulder I&D, removal of Dahiana implant, revision reverse TSA, biceps tendonesis, lat dorsi and teres major tendon transfer  L shoulder   DATE OF ONSET: 20 surgery  REFERRING PHYSICIAN: Jolanta Mcguire MD MD Orders: Eval and Treat; HEP; ROM; Strength; \"full motion/full strength\" (20)  Return MD Appointment: 20       Meme Napier was cleared by the screener to enter the clinic this morning. Pre-treatment Symptoms/Complaints: Says she was sore yesterday, but much better today. Felt good about being able to do the exercises last time. Pain: Initial:   no pain now. Post Session: no complaints of pain   Medications Last Reviewed: 20    Updated Objective Findings:   No new measure. TREATMENT:     Active Warm up on UBE x10' with alternating double arm and single arm each minute. Therapeutic Exercise: (45 Minutes): Exercises for shoulder and shoulder girdle strength and stabilization as per grid. Exercises modified as indicated. Manual resisted alternating isometrics and rhythmic stabilization as indicated. HEP: No new HEP.    Date  20 Date  20 Date  20 Date  20 Date  20 Date  20 Date  20 Date  20 Date  20 Date  20 Date  20 Date  6/3/20 Activity/Exercise               UBE - L. 2 x10'  L. 3 x10' L. 3 x10' L. 3 x10' L. 3 x10' L.3 x10' L. 3 x10' L. 3 x10' L.3 x10' L 3 x10' L. 3 x10'   Pulleys - - - - D/C    - - - -   AROM - - Muscle Balance drills as above Muscle Balance drills as above Muscle Balance drills as above - As above 4-way Wall slide 1x15 ea - - - -   Serratus press 45-deg beach chair, manual resisted  2x10 Supine press  2# x15  3# x15 Supine  3# 1x10  4# 2x10 Supine  4# 1x10  5# 2x10 - myokin  trunk arounds to L, blue single 3x5 breath;  Supine press   5# 3x10 Supine press with manual abd/ER and add/IR   3x10 ea Supine  5# 3x10;  60-deg incliine  2# 3x10 AAROM Supine with alt iso to rhyth stab  3# x3 Standing dynamic hug  Blue 3x10 - Standing dynamic hug  Blue 2x15   Shoulder Ext - Bent over  2# 2x10;  Standing cable  7# 3x10   Standing cable  7# x15  10# 2x10 Standing  10# cable 3x10 - bilat cable pull down, underhand  13# x15  17# x15  20# x10 - Standing Ext/add  7# x15  10# 2x10;  Pull down, underhand  20# 3x10 - Sitting reverse ext  7# cable  2x10;  bilat cable pull down, underhand  13# x10  20# 2x10 - Sitting reverse ext  7# cable  3x10 ea;  pull down, underhand  20# 3x15   Middle trapezius - Bent over  2# 3x10` Bent over, bilat  3# 3x10 Bent over, bilat  3# 3x10 - - - Bent over  3# 3x10 ea - Bent over   4# 2x10 - Standing, bilat horiz abd   Red 3x10   Lower trapezius - Bent over  2# 3x10 - - - - - Bent over 3# 3x10 ea - Bent over   4# 2x10 - Standing bilat \"Y\"  Red 2x10   Scapular retractions - Bent Row  2# 1x15  3# 1x15;    Lat pull down, underhand, bilat  10# 1x15  13# 1x15     - Bent row  5# x10  7# x10  8# x10 - Bent Row  8# x15 ea  9# x15  10# x15 - Bent row  10# 3 - Bent row  10# 3x10 Landmine Row  17.5# bar  2x15 ea   Landmine Row  17.5# bar  2x15 ea   Shoulder ER - - - Propped in plane of scap, eccentric  0# x10  1# 2x10 Standing,   by side  Yellow single  10\" hold x10 with assist;  supported at 90-deg scaption  Yellow  10\"x10 with assist Standing   by side with forward reach combo  Yellow single 3x5-6 ER and IR  Supine   0-deg abd  X10;  ER at 90-dge scaption 45-deg incline  1x10 - Standing manual resisted alt iso in neutral  3x20-30\"   - - -   Forward Elevation 45-deg beach chair, AROM x10, manual resisted ant deltoid conc/ecc  3x10; straight arm scaption man resisted 2x10;  Standing hand to top of head, 3x5;  500-g overhead lifts at wall to 12, 10, and 2 o'clock x5 ea Standing, to 90 deg  2# 2x15    Wall slide with lift off,  unilat  Yellow 3x10   - Wall slide with lift off, bilat  Yellow 2x10;  Standing forward raise to 90-deg  3# 3x10 - Standing latera to forward raise,  90-deg  3# 2x10 - Standing latera to forward raise,  90-deg  3# 3x10 Standing   90-deg iso hold with alt iso/rhythm stab  x3 Standing to  90-deg  4# 2x10;  Scaption wall slide with lift off  Red 2x10 Landmine press  15# bar  3x10;  Standing overhead modified clean  15# bar 1x5,  Clean and press  15# bar  1x10 Landmine press  15# bar  3x10 ea   Abduction - Standing to 90-deg  2# 3x10 bilat   3# 3x10 bialt  3# 3x10 - - - - - Standing to 90-deg  4# 2x10 Standing upright row   15# bar  1x10 -   Diagonal Flexion 45-deg beach chair, AROM x10,   manual resisted conc/ecc 2x10 ea way - - - - - manual resisted  Supine  D1, D2  3x10 ea;  45-deg incline  D1, D2  3x10 ea - 45-deg beach chair AROM x10,  Man RAROM conc 2x10,   Conc/ecc 1x10 ea;  Standing D1 man resisted 3x10 D1 standing  Red 2x10 D1  Single yellow  2x15;  D2   Single blue  2x15 -   Diagonal Extension 45-deg beach chair, AROM x10,   manual resisted conc/ecc 2x10 ea way - - - - - manual resisted  Supine  D1, D2  2x10 ea - 45-deg beach chair AROM x10,  Man RAROM conc 2x10,   Conc/ecc 1x10 ea - D1  Single blue  2x15;  D2  Single yellow  2x15 -   UE [place]     - - DC        Internal rotation - - - - Standing,   by side  Yellow single  10\" hold x10 with assist;  supported at 90-deg scaption  Yellow  10\"x10 with assist -   - - - -   Rhythmic Stabilization Wall dribble clock, 12-6 o'clock, 500-g ball  1x30-50 rep ea;  Red flex bar forward elevation,   Scaption, abduction, D1, D2 flex, slow 2x5 each - Wall dribble, 12-6 o'clock, 500-g ball   2x30-40 ea - Red flex bar: scaption, abd, D1, D2   3x5 ea -  Manual resisted as above Wall dribble, 12-6 o'clock,  500-g  1x50 ea Quadruped, manual ressted  Alt iso front/backand lat x10 ea, and to L UE Red flex bar   Standing scaption x5,   abd x5,  horiz  abd/add x5 - -   Closed kinetic chain--UE Quadruped  Alt UE lift x10 ea; Opposites x10 ea - Quadruped opposites 3x5 ea;  Quadruped to down dog x10 Quadruped opposites 1x10;   Knee push up, 1/2 range  2x5 - quadruped resp belly lift 1x5 breath; Tall plank 10\"x3 - - Tall plank on knees  Shoulder tap x10 Quadruped to down dog 2x10 Tall plank  1x10\"; Plank on elbows   1x10\"; Side plank on knees 3x5\"; Push up plantigrade  3x5, U/LE oppositesin plantigrade 3x2 3 ea -   -  Treatment/Session Summary:    · Response to Treatment: Very good performance of the strength and stabilization exercises. · Communication/Consultation:  None today  · Equipment provided today:  None today  · Recommendations/Intent for next treatment session: We will continue with controlled shoulder re-strengthening, and prep HEP for discharge soon. She returns to Dr. Bam Herzog on Monday. Will anticipate continue PT for re-strengthening.     Total Treatment Billable Duration: 45 minutes  PT Patient Time In/Time Out  Time In: 1005  Time Out: Dell Sanford 64, PT    Future Appointments   Date Time Provider Asuncion Mccormick   6/10/2020  4:00 PM Genaro Che [facility]   6/12/2020  1:00 PM Kavitha Moreno PT [facility]

## 2020-06-10 ENCOUNTER — HOSPITAL ENCOUNTER (OUTPATIENT)
Dept: PHYSICAL THERAPY | Age: 60
Discharge: HOME | End: 2020-06-10
Payer: COMMERCIAL

## 2020-06-10 PROCEDURE — 97110 THERAPEUTIC EXERCISES: CPT

## 2020-06-10 NOTE — PROGRESS NOTES
Zoe Schrader  : 1960  Payor: EBER [facility], [address] [place] [address] / Plan: [place] PLANNED Neisha Martínezt. / Product Type: Commerical /  [address]  at [facility] DULCE ANGUIANO  [address] [facility], [address], [address] [facility]  [phone]   [phone]       OUTPATIENT PHYSICAL THERAPY: Daily Treatment Note 6/10/2020  Visit Count: 29     ICD-10: Treatment Diagnosis: Pain in left shoulder (M25.512); Presence of left artificial shoulder joint (V79.202)  Precautions/Allergies: Post op precautions. From EMR: Codeine   TREATMENT PLAN:  Effective Dates: 2020 TO 2020 (30 days). Frequency/Duration: 2 times a week for 30 additional Day(s) MEDICAL/REFERRING DIAGNOSIS:s/p L shoulder I&D, removal of [place] implant, revision reverse TSA, biceps tendonesis, lat dorsi and teres major tendon transfer  L shoulder   DATE OF ONSET: 20 surgery  REFERRING PHYSICIAN: Sharita Mcguire MD MD Orders: Eval and Treat; HEP; ROM; Strength; \"continue PT\" (20)  Return MD Appointment:        Zoe Schrader was cleared by the screener to enter the clinic this morning. Reports having her follow up with Dr. Debby Schwab. He is very pleased with her progress. He provided a new order to continue PT for the next 6 weeks. She is to follow up with him then. Pre-treatment Symptoms/Complaints: Says her shoulder is doing \"really good\". No pain to report. Feels it is getting stronger and using through the day better. Did not go to the gym over the weekend because it was closed due to a COVID19 case. Pain: Initial:   no pain now. Post Session: no complaints of pain   Medications Last Reviewed: 6/10/20    Updated Objective Findings:   No new measure. TREATMENT:     Active Warm up on UBE x10' with alternating double arm and single arm each minute. Therapeutic Exercise: (45 Minutes): Exercises for shoulder and shoulder girdle strength and stabilization as per grid. Exercises modified as indicated.  Minimal cues for exercises technique and movement mechanics. HEP: No new HEP. Date  6/1/20 Date  6/3/20 Date  6/10/20   Activity/Exercise      UBE L 3 x10' L. 3 x10' L. 3 x10'   Pulleys - - -   AROM - - -   Serratus press - Standing dynamic hug  Blue 2x15 -   Shoulder Ext - Sitting reverse ext  7# cable  3x10 ea;  pull down, underhand  20# 3x15 Pull down, bilat underhand  17# x10  23# x10  27# x10   Middle trapezius - Standing, bilat horiz abd   Red 3x10 -   Lower trapezius - Standing bilat \"Y\"  Red 2x10 -   Scapular retractions Landmine Row  17.5# bar  2x15 ea   Landmine Row  17.5# bar  2x15 ea Landmine Row  17.5# bar  3x15 ea   Shoulder ER - - Standing,   by side single red  3 x10   Forward Elevation Landmine press  15# bar  3x10;  Standing overhead modified clean  15# bar 1x5,  Clean and press  15# bar  1x10 Landmine press  15# bar  3x10 ea Landmine press  17.5# bar  3x10;  Standing   Clean and press  15# bar   3x10   Abduction Standing upright row   15# bar  1x10 - Standing upright row   15# bar  1x10   Diagonal Flexion D1  Single yellow  2x15;  D2   Single blue  2x15 - D1   Single red  3x10;  D2  Green  3x10   Diagonal Extension D1  Single blue  2x15;  D2  Single yellow  2x15 - -   UE [place]      Internal rotation - - Standing,   by side single red  3 x10    Rhythmic Stabilization - - -   Closed kinetic chain--UE Tall plank  1x10\"; Plank on elbows   1x10\"; Side plank on knees 3x5\"; Push up plantigrade  3x5, U/LE oppositesin plantigrade 3x2 3 ea - -   Horiz abd/add   Landmine H.abd/add  15# bar 2x10   -  Treatment/Session Summary:    · Response to Treatment: Very good performance of the strength and stabilization exercises. · Communication/Consultation:  None today  · Equipment provided today:  None today  · Recommendations/Intent for next treatment session: We will continue with controlled shoulder girdle re-strengthening.      Total Treatment Billable Duration: 45 minutes  PT Patient Time In/Time Out  Time In: 1610  Time Out: [address] [place], PT    Future Appointments   Date Time Provider Asuncion Mccormick   6/12/2020  1:00 PM Carlton Obando, PT [facility]   6/15/2020  9:00 AM Carlton Obando, PT [facility]   6/17/2020  9:00 AM Carlton Obando, PT [facility]   6/29/2020  9:00 AM Carlton Obando, PT [facility]   7/1/2020  9:00 AM Carlton Obando, PT [facility]   7/6/2020  9:00 AM Carlton Obando, PT [facility]   7/8/2020  9:00 AM Carlton Obando, PT [facility]   7/15/2020  9:00 AM Carlton Obando, PT [facility]   7/20/2020  9:00 AM Carlton Obando, PT [facility]   7/22/2020  9:00 AM Carlton Obando, PT [facility]   7/27/2020  9:00 AM Carlton Obando, PT [facility]   7/29/2020  9:00 AM Carlton Obando, PT [facility]

## 2020-06-12 ENCOUNTER — HOSPITAL ENCOUNTER (OUTPATIENT)
Dept: PHYSICAL THERAPY | Age: 60
Discharge: HOME | End: 2020-06-12
Payer: COMMERCIAL

## 2020-06-12 PROCEDURE — 97110 THERAPEUTIC EXERCISES: CPT

## 2020-06-12 NOTE — PROGRESS NOTES
Lorrayne Dakins  : 1960  Payor: EBER [facility], [address] [place] [address] / Plan: [place] PLANNED Tanisha Brooks. / Product Type: Commerical /  [address] [place]  at [facility] DULCE ANGUIANO  [address] [facility], [address], [address] [facility]  [phone]   [phone]       OUTPATIENT PHYSICAL THERAPY: Daily Treatment Note 2020  Visit Count: 30     ICD-10: Treatment Diagnosis: Pain in left shoulder (M25.512); Presence of left artificial shoulder joint (K35.128)  Precautions/Allergies: Post op precautions. From EMR: Codeine   TREATMENT PLAN:  Effective Dates: 2020 TO 2020 (30 days). Frequency/Duration: 2 times a week for 30 additional Day(s) MEDICAL/REFERRING DIAGNOSIS:s/p L shoulder I&D, removal of Dahiana implant, revision reverse TSA, biceps tendonesis, lat dorsi and teres major tendon transfer  L shoulder   DATE OF ONSET: 20 surgery  REFERRING PHYSICIAN: Antoinette Mcguire MD MD Orders: Eval and Treat; HEP; ROM; Strength; \"continue PT\" (20)  Return MD Appointment:        Lorrayne Dakins was cleared by the screener to enter the clinic this morning. Pre-treatment Symptoms/Complaints: Says her shoulder is \"good\". Was not too sore after last session. Says she practiced holding her pistol after we talked about it last time. Was able to hold it out and pull the trigger (without bullets) without issue, and her  said it looked like she held it steady. Now needs to experiment with how she will holster it for quick retrieval.   Pain: Initial:   no pain now. Post Session: no complaints of pain   Medications Last Reviewed: 6/10/20    Updated Objective Findings:   No new measure. TREATMENT:     Active Warm up on UBE x10' with alternating double arm and single arm each minute. Therapeutic Exercise: (45 Minutes): Exercises for shoulder and shoulder girdle strength and stabilization as per grid. Exercises modified as indicated.  Minimal cues for exercises technique and movement mechanics. HEP: No new HEP. Date  6/1/20 Date  6/3/20 Date  6/10/20 Date  6/12/20   Activity/Exercise       UBE L 3 x10' L. 3 x10' L. 3 x10' L. 3 x10'   Pulleys - - - -   AROM - - - -   Serratus press - Standing dynamic hug  Blue 2x15 - Standing dynamic hug   Black   3x12   Shoulder Ext - Sitting reverse ext  7# cable  3x10 ea;  pull down, underhand  20# 3x15 Pull down, bilat underhand  17# x10  23# x10  27# x10 Reverse ext  10#  Cable 1x20   Middle trapezius - Standing, bilat horiz abd   Red 3x10 - Bent over  3# 1x20   Lower trapezius - Standing bilat \"Y\"  Red 2x10 - Bent over   3# 1x20   Scapular retractions Landmine Row  17.5# bar  2x15 ea   Landmine Row  17.5# bar  2x15 ea Landmine Row  17.5# bar  3x15 ea Rope Pull  10# cable  2x10 rep   Shoulder ER - - Standing,   by side single red  3 x10 -   Forward Elevation Landmine press  15# bar  3x10;  Standing overhead modified clean  15# bar 1x5,  Clean and press  15# bar  1x10 Landmine press  15# bar  3x10 ea Landmine press  17.5# bar  3x10;  Standing   Clean and press  15# bar   3x10 Wall slide+lift off  Red 3x10   Abduction Standing upright row   15# bar  1x10 - Standing upright row   15# bar  1x10 -   Diagonal Flexion D1  Single yellow  2x15;  D2   Single blue  2x15 - D1   Single red  3x10;  D2  Green  3x10 -   Diagonal Extension D1  Single blue  2x15;  D2  Single yellow  2x15 - - -   Internal rotation - - Standing,   by side single red  3 x10  -   Rhythmic Stabilization - - - Red flex bar scaption, slow  2x5 ea,  D2 to 90-deg flex x5   Closed kinetic chain--UE Tall plank  1x10\"; Plank on elbows   1x10\"; Side plank on knees 3x5\"; Push up plantigrade  3x5, U/LE oppositesin plantigrade 3x2 3 ea - - Crawl fwd/back 1x10-ft ea;  Bear walk fwd 3x10-ft;   Ab roller  3x6   Horiz abd/add   Landmine H.abd/add  15# bar 2x10 -   Dead Lift    35# bar  3x15   -  Treatment/Session Summary:    · Response to Treatment: Very good performance of the strength and stabilization exercises. Progressed closed chain weight loading activity with the crawling and bear crawling. Good performance with these. No pain noted with these. · Communication/Consultation:  None today  · Equipment provided today:  None today  · Recommendations/Intent for next treatment session: We will continue with controlled shoulder girdle re-strengthening.      Total Treatment Billable Duration: 45 minutes  PT Patient Time In/Time Out  Time In: 1250  Time Out: [place] [address], PT    Future Appointments   Date Time Provider Asuncion Mccormick   6/15/2020  9:00 AM Herlinda Maat, PT [facility]   6/17/2020  9:00 AM Anjali New, PT [facility]   6/29/2020  9:00 AM Anjali New, PT [facility]   7/1/2020  9:00 AM Anjali New, PT [facility]   7/6/2020  9:00 AM Anjali New, PT [facility]   7/8/2020  9:00 AM Anjali New, PT [facility]   7/15/2020  9:00 AM Anjali New, PT [facility]   7/20/2020  9:00 AM Anjali New, PT [facility]   7/22/2020  9:00 AM Anjali New, PT [facility]   7/27/2020  9:00 AM Anjali New, PT [facility]   7/29/2020  9:00 AM Anjali New, PT [facility]

## 2020-06-15 ENCOUNTER — HOSPITAL ENCOUNTER (OUTPATIENT)
Dept: PHYSICAL THERAPY | Age: 60
Discharge: HOME | End: 2020-06-15
Payer: COMMERCIAL

## 2020-06-15 PROCEDURE — 97110 THERAPEUTIC EXERCISES: CPT

## 2020-06-15 NOTE — PROGRESS NOTES
Anjelica Badillo  : 1960  Payor: EBER HERRERA, [address] [place] [address] / Plan: YOLI Adkins. / Product Type: Commerical /  [address] [place]  at [facility] DULCE ANGUIANO  [address] [place] [address], [address], [place] [address].  [phone]   [phone]       OUTPATIENT PHYSICAL THERAPY: Daily Treatment Note 6/15/2020  Visit Count: 31     ICD-10: Treatment Diagnosis: Pain in left shoulder (M25.512); Presence of left artificial shoulder joint (E38.908)  Precautions/Allergies: Post op precautions. From EMR: Codeine   TREATMENT PLAN:  Effective Dates: 2020 TO 2020 (30 days). Frequency/Duration: 2 times a week for 30 additional Day(s) MEDICAL/REFERRING DIAGNOSIS:s/p L shoulder I&D, removal of Dahiana implant, revision reverse TSA, biceps tendonesis, lat dorsi and teres major tendon transfer  L shoulder   DATE OF ONSET: 20 surgery  REFERRING PHYSICIAN: Cox, Bufford Hamman, MD MD Orders: Eval and Treat; HEP; ROM; Strength; \"continue PT\" (20)  Return MD Appointment:        Anjelica Badillo was cleared by the screener to enter the clinic this morning. Pre-treatment Symptoms/Complaints: Says her shoulder was pretty sore after last time, but iced it and it went away. Doing \"good\" this morning. Pain: Initial:   no pain now. Post Session: no complaints of pain   Medications Last Reviewed: 6/15/20    Updated Objective Findings:   No new measure. TREATMENT:     Active Warm up on UBE x10' with alternating double arm and single arm each minute. Therapeutic Exercise: (45 Minutes): Exercises for shoulder and shoulder girdle strength and stabilization as per grid. Exercises modified as indicated. Minimal cues for exercises technique and movement mechanics. HEP: No new HEP.    Date  20 Date  6/3/20 Date  6/10/20 Date  20 Date  6/15/20   Activity/Exercise        UBE L 3 x10' L. 3 x10' L. 3 x10' L. 3 x10' L. 3 x10'   Pulleys - - - - -   AROM - - - - -   Serratus press - Standing dynamic hug  Blue 2x15 - Standing dynamic hug   Black   3x12 -   Shoulder Ext - Sitting reverse ext  7# cable  3x10 ea;  pull down, underhand  20# 3x15 Pull down, bilat underhand  17# x10  23# x10  27# x10 Reverse ext  10#  Cable 1x20 Pull down, bilat underhand  27# 3x10   Middle trapezius - Standing, bilat horiz abd   Red 3x10 - Bent over  3# 1x20 -   Lower trapezius - Standing bilat \"Y\"  Red 2x10 - Bent over   3# 1x20 -   Scapular retractions Landmine Row  17.5# bar  2x15 ea   Landmine Row  17.5# bar  2x15 ea Landmine Row  17.5# bar  3x15 ea Rope Pull  10# cable  2x10 rep Landmine Row  20# bar  3x12 ea   Shoulder ER - - Standing,   by side single red  3 x10 - -   Forward Elevation Landmine press  15# bar  3x10;  Standing overhead modified clean  15# bar 1x5,  Clean and press  15# bar  1x10 Landmine press  15# bar  3x10 ea Landmine press  17.5# bar  3x10;  Standing   Clean and press  15# bar   3x10 Wall slide+lift off  Red 3x10 Landmine press  20# bar  3x10    Abduction Standing upright row   15# bar  1x10 - Standing upright row   15# bar  1x10 - -   Diagonal Flexion D1  Single yellow  2x15;  D2   Single blue  2x15 - D1   Single red  3x10;  D2  Green  3x10 - D1:   Single yellow  3x10;  D2:  single red  3x10   Diagonal Extension D1  Single blue  2x15;  D2  Single yellow  2x15 - - - D1:  Single blue  X15, double blue 3x12;  D2:  Single yellow  1x15, double yellow  3x12   Internal rotation - - Standing,   by side single red  3 x10  - -   Rhythmic Stabilization - - - Red flex bar scaption, slow  2x5 ea,  D2 to 90-deg flex x5 Wall dribble   500-g ball  12 to 6 o'clock  1x30 ea   Closed kinetic chain--UE Tall plank  1x10\"; Plank on elbows   1x10\"; Side plank on knees 3x5\"; Push up plantigrade  3x5, U/LE oppositesin plantigrade 3x2 3 ea - - Crawl fwd/back 1x10-ft ea;  Bear walk fwd 3x10-ft;   Ab roller  3x6 Tall plank to downward dog 2x5   Horiz abd/add   Landmine H.abd/add  15# bar 2x10 - -   Dead Lift    35# bar  3x15 -    -  Treatment/Session Summary:    · Response to Treatment: Very good performance of the exercises. Progressing loads and resistance with moves. · Communication/Consultation:  None today  · Equipment provided today:  None today  · Recommendations/Intent for next treatment session: We will continue with controlled shoulder girdle re-strengthening.      Total Treatment Billable Duration: 45 minutes  PT Patient Time In/Time Out  Time In: 0900  Time Out: [place], DAMEON    Future Appointments   Date Time Provider Asuncion Mccormick   6/17/2020  9:00 AM Madai Palencia, PT [facility]   6/29/2020  9:00 AM [facility] Rust, [facility]   7/1/2020  9:00 AM Marcia Rust, PT [facility]   7/6/2020  9:00 AM Marcia Rust, PT [facility]   7/8/2020  9:00 AM Marcia Rust, PT [facility]   7/15/2020  9:00 AM Marcia Rust, PT [facility]   7/20/2020  9:00 AM Marcia Rust, PT [facility]   7/22/2020  9:00 AM Marcia Rust, PT [facility]   7/27/2020  9:00 AM Marcia Rust, PT [facility]   7/29/2020  9:00 AM Marcia Rust, PT [facility]

## 2020-06-17 ENCOUNTER — HOSPITAL ENCOUNTER (OUTPATIENT)
Dept: PHYSICAL THERAPY | Age: 60
Discharge: HOME | End: 2020-06-17
Payer: COMMERCIAL

## 2020-06-17 PROCEDURE — 97110 THERAPEUTIC EXERCISES: CPT

## 2020-06-29 ENCOUNTER — HOSPITAL ENCOUNTER (OUTPATIENT)
Dept: PHYSICAL THERAPY | Age: 60
Discharge: HOME | End: 2020-06-29
Payer: COMMERCIAL

## 2020-06-29 PROCEDURE — 97110 THERAPEUTIC EXERCISES: CPT

## 2020-06-29 NOTE — PROGRESS NOTES
Ani Cowan  : 1960  Payor: EBER [facility], [address] [place] [address] / Plan: [place] PLANNED Vikki Carter. / Product Type: Commerical /  [address]  at [facility] DULCE ANGUIANO  [address] [facility], [address], [place] [address].  [phone]   [phone]       OUTPATIENT PHYSICAL THERAPY: Daily Treatment Note 2020  Visit Count: 33     ICD-10: Treatment Diagnosis: Pain in left shoulder (M25.512); Presence of left artificial shoulder joint (G59.787)  Precautions/Allergies: Post op precautions. From EMR: Codeine   TREATMENT PLAN:  Effective Dates: 2020 TO 2020 (30 days). Frequency/Duration: 2 times a week for 30 additional Day(s) MEDICAL/REFERRING DIAGNOSIS:s/p L shoulder I&D, removal of [place] implant, revision reverse TSA, biceps tendonesis, lat dorsi and teres major tendon transfer  L shoulder   DATE OF ONSET: 20 surgery  REFERRING PHYSICIAN: Maycol Mcguire MD MD Orders: Eval and Treat; HEP; ROM; Strength; \"continue PT\" (20)  Return MD Appointment:        Ani Cowan was cleared by the screener to enter the clinic this morning. Pre-treatment Symptoms/Complaints: Says her shoulder is doing good. Went to the gym 2 time last week and walked every day. Did about all the exercises we have been working in clinic. Felt good about it. Just gets \"tight\" and a little sore after workouts. Pain: Initial:   no pain now. Post Session: no complaints of pain   Medications Last Reviewed: 20    Updated Objective Findings:   No new measure. TREATMENT:     Active Warm up on UBE x10' with alternating double arm and single arm each minute. Therapeutic Exercise: (50. Minutes): Exercises for shoulder and shoulder girdle strength and stabilization as per grid. Exercises modified as indicated. Minimal cues for exercises technique and movement mechanics. HEP: No new HEP.    Date  20 Date  6/3/20 Date  6/10/20 Date  20 Date  6/15/20 Date  20 Date  6/29/20   Activity/Exercise          UBE L 3 x10' L. 3 x10' L. 3 x10' L. 3 x10' L. 3 x10' L. 3 x10' L. 3 x10   Pulleys - - - - - D/C    AROM - - - - - D/C    Serratus press - Standing dynamic hug  Blue 2x15 - Standing dynamic hug   Black   3x12 - Standing dynamic hug   Black   3x10-12 Cable press  7# 3x10   Shoulder Ext - Sitting reverse ext  7# cable  3x10 ea;  pull down, underhand  20# 3x15 Pull down, bilat underhand  17# x10  23# x10  27# x10 Reverse ext  10#  Cable 1x20 Pull down, bilat underhand  27# 3x10 - -   Middle trapezius - Standing, bilat horiz abd   Red 3x10 - Bent over  3# 1x20 - Bent over   3# 1x20  -   Lower trapezius - Standing bilat \"Y\"  Red 2x10 - Bent over   3# 1x20 - Bent over   3# 1x20 -   Scapular retractions Landmine Row  17.5# bar  2x15 ea   Landmine Row  17.5# bar  2x15 ea Landmine Row  17.5# bar  3x15 ea Rope Pull  10# cable  2x10 rep Landmine Row  20# bar  3x12 ea Rope Pull  10# cable  3x2-3 Cable row  10# 3x10    Shoulder ER - - Standing,   by side single red  3 x10 - - - -   Forward Elevation Landmine press  15# bar  3x10;  Standing overhead modified clean  15# bar 1x5,  Clean and press  15# bar  1x10 Landmine press  15# bar  3x10 ea Landmine press  17.5# bar  3x10;  Standing   Clean and press  15# bar   3x10 Wall slide+lift off  Red 3x10 Landmine press  20# bar  3x10  Wall slide+lift off  Red 3x10 -   Abduction Standing upright row   15# bar  1x10 - Standing upright row   15# bar  1x10 - - - -   Diagonal Flexion D1  Single yellow  2x15;  D2   Single blue  2x15 - D1   Single red  3x10;  D2  Green  3x10 - D1:   Single yellow  3x10;  D2:  single red  3x10 - D1:  3# cable  2x10;    D2:  3# cable  2x10   Diagonal Extension D1  Single blue  2x15;  D2  Single yellow  2x15 - - - D1:  Single blue  X15, double blue 3x12;  D2:  Single yellow  1x15, double yellow  3x12 - D1:  7# cable  3x10 ea;    D2:  7# cable  3x10 ea   Internal rotation - - Standing,   by side single red  3 x10  - - - - Rhythmic Stabilization - - - Red flex bar scaption, slow  2x5 ea,  D2 to 90-deg flex x5 Wall dribble   500-g ball  12 to 6 o'clock  1x30 ea Wall dribble   500-g ball  12 to 6 o'clock  2x40-50 ea;  Red flex bar scaption, slow  3x5 ea Wall dribble   500-g ball  12 to 6 o'clock  1x50 ea;  Blue flex bar scaption, slow  3x5 ea   Closed kinetic chain--UE Tall plank  1x10\"; Plank on elbows   1x10\"; Side plank on knees 3x5\"; Push up plantigrade  3x5, U/LE oppositesin plantigrade 3x2 3 ea - - Crawl fwd/back 1x10-ft ea;  Bear walk fwd 3x10-ft; Ab roller  3x6 Tall plank to downward dog 2x5 Bear walk fwd 4x10-ft; Stability ball UE walk-out, fwd/back 2x4 steps; Ab roller  3x5 Tall plank to downward dog 1x10;  Plank on knee+alt UE lift 2x10 ea   Horiz abd/add   Landmine H.abd/add  15# bar 2x10 - - Landmine H.abd/add  20# bar  3x10 H Abd   7# cable  3x10 ea;  H Add  3# cable  3x10a ea   Dead Lift    35# bar  3x15 -  35# bar  3x15 -   Reactive Control/Coordination      Small ball catch  Double hand, single hand x5' -   -  Treatment/Session Summary:    · Response to Treatment: Continues with very good performance of the exercises. Strength is progressing to L shoulder and shoulder girdle. · Communication/Consultation:  None today  · Equipment provided today:  None today  · Recommendations/Intent for next treatment session: We will continue with controlled shoulder girdle re-strengthening.      Total Treatment Billable Duration: 50 minutes  PT Patient Time In/Time Out  Time In: 0900  Time Out: Curtis 9, PT    Future Appointments   Date Time Provider Asuncion Mccormick   7/1/2020  9:00 AM Tucker Echavarria, PT PATRICIA [facility]   7/6/2020  9:00 AM Adarsh Logan, PT TIANORPRAFAELA [facility]   7/8/2020  9:00 AM Adarsh Logan, PT T[facility]ORPRAFAELA [facility]   7/15/2020  9:00 AM Adarsh Logan, PT [facility]   7/20/2020  9:00 [facility]   7/22/2020 9:00 AM Cleveland Linn, PT [facility]IC[facility]   7/27/2020  9:00 AM [facility]   7/29/2020  9:00 AM Ginny Foss, PT [facility]

## 2020-07-01 ENCOUNTER — HOSPITAL ENCOUNTER (OUTPATIENT)
Dept: PHYSICAL THERAPY | Age: 60
Discharge: HOME | End: 2020-07-01
Payer: COMMERCIAL

## 2020-07-01 PROCEDURE — 97110 THERAPEUTIC EXERCISES: CPT

## 2020-07-01 NOTE — PROGRESS NOTES
Zelalem Salas  : 1960  Payor: PLANNED [facility], [address] [place] [address] / Plan: [place] PLANNED Alannah Flavors. / Product Type: Commerical /  [address]  at [facility] DULCE ANGUIANO  [address] [facility], [address], [facility].  [phone]   [phone]       OUTPATIENT PHYSICAL THERAPY: Daily Treatment Note 2020  Visit Count: 34     ICD-10: Treatment Diagnosis: Pain in left shoulder (M25.512); Presence of left artificial shoulder joint (M73.226)  Precautions/Allergies: Post op precautions. From EMR: Codeine   TREATMENT PLAN:  Effective Dates: 2020 TO 2020 (30 days). Frequency/Duration: 2 times a week for 30 additional Day(s) MEDICAL/REFERRING DIAGNOSIS:s/p L shoulder I&D, removal of [place] implant, revision reverse TSA, biceps tendonesis, lat dorsi and teres major tendon transfer  L shoulder   DATE OF ONSET: 20 surgery  REFERRING PHYSICIAN: Nicolas Mcguire MD MD Orders: Eval and Treat; HEP; ROM; Strength; \"continue PT\" (20)  Return MD Appointment:        Zelalem Salas was cleared by the screener to enter the clinic this morning. Pre-treatment Symptoms/Complaints: Says she is doing well. No significant soreness to report after last time. Worked in the yard yesterday. Used loppers to trim branches. This was difficult. Pain: Initial:   no pain now. Post Session: no complaints of pain   Medications Last Reviewed: 20    Updated Objective Findings:   No new measure. TREATMENT:     Active Warm up on UBE x10' with alternating double arm and single arm each minute. Therapeutic Exercise: (45 Minutes): Exercises for shoulder and shoulder girdle strength and stabilization as per grid. Exercises modified as indicated. Minimal cues for exercises technique and movement mechanics. HEP: No new HEP.    Date  20 Date  6/3/20 Date  6/10/20 Date  20 Date  6/15/20 Date  20 Date  20 Date  20   Activity/Exercise           UBE L 3 x10' L. 3 x10' L. 3 x10' L. 3 x10' L. 3 x10' L. 3 x10' L. 3 x10' L. 3.5  x10'   Pulleys - - - - - D/C     AROM - - - - - D/C     Serratus press - Standing dynamic hug  Blue 2x15 - Standing dynamic hug   Black   3x12 - Standing dynamic hug   Black   3x10-12 Cable press  7# 3x10 -   Shoulder Ext - Sitting reverse ext  7# cable  3x10 ea;  pull down, underhand  20# 3x15 Pull down, bilat underhand  17# x10  23# x10  27# x10 Reverse ext  10#  Cable 1x20 Pull down, bilat underhand  27# 3x10 - - --   Middle trapezius - Standing, bilat horiz abd   Red 3x10 - Bent over  3# 1x20 - Bent over   3# 1x20  - -   Lower trapezius - Standing bilat \"Y\"  Red 2x10 - Bent over   3# 1x20 - Bent over   3# 1x20 - -   Scapular retractions Landmine Row  17.5# bar  2x15 ea   Landmine Row  17.5# bar  2x15 ea Landmine Row  17.5# bar  3x15 ea Rope Pull  10# cable  2x10 rep Landmine Row  20# bar  3x12 ea Rope Pull  10# cable  3x2-3 Cable row  10# 3x10  -   Shoulder ER - - Standing,   by side single red  3 x10 - - - - -   Forward Elevation Landmine press  15# bar  3x10;  Standing overhead modified clean  15# bar 1x5,  Clean and press  15# bar  1x10 Landmine press  15# bar  3x10 ea Landmine press  17.5# bar  3x10;  Standing   Clean and press  15# bar   3x10 Wall slide+lift off  Red 3x10 Landmine press  20# bar  3x10  Wall slide+lift off  Red 3x10 - Overhead press  20# bar   2x10-13; Forward raise to 90-deg  3# 2x15;   Forward reach wrist roll  2.5# 1x6   Abduction Standing upright row   15# bar  1x10 - Standing upright row   15# bar  1x10 - - - - Straight arm fly  3# 2x15;  Bent arm fly  2# 2x15;  Upright row  20# bar  2x15   Diagonal Flexion D1  Single yellow  2x15;  D2   Single blue  2x15 - D1   Single red  3x10;  D2  Green  3x10 - D1:   Single yellow  3x10;  D2:  single red  3x10 - D1:  3# cable  2x10;    D2:  3# cable  2x10 -   Diagonal Extension D1  Single blue  2x15;  D2  Single yellow  2x15 - - - D1:  Single blue  X15, double blue 3x12;  D2:  Single yellow  1x15, double yellow  3x12 - D1:  7# cable  3x10 ea;    D2:  7# cable  3x10 ea -   Internal rotation - - Standing,   by side single red  3 x10  - - - - Ball squeeze  5-6\" 2x10   Rhythmic Stabilization - - - Red flex bar scaption, slow  2x5 ea,  D2 to 90-deg flex x5 Wall dribble   500-g ball  12 to 6 o'clock  1x30 ea Wall dribble   500-g ball  12 to 6 o'clock  2x40-50 ea;  Red flex bar scaption, slow  3x5 ea Wall dribble   500-g ball  12 to 6 o'clock  1x50 ea;  Blue flex bar scaption, slow  3x5 ea Blue flex bar, 5 ways   1x5 ea; Ball Drops, 3-ways standing,   500-g ball  1x30 ea      Closed kinetic chain--UE Tall plank  1x10\"; Plank on elbows   1x10\"; Side plank on knees 3x5\"; Push up plantigrade  3x5, U/LE oppositesin plantigrade 3x2 3 ea - - Crawl fwd/back 1x10-ft ea;  Bear walk fwd 3x10-ft; Ab roller  3x6 Tall plank to downward dog 2x5 Bear walk fwd 4x10-ft; Stability ball UE walk-out, fwd/back 2x4 steps; Ab roller  3x5 Tall plank to downward dog 1x10;  Plank on knee+alt UE lift 2x10 ea -   Horiz abd/add   Landmine H.abd/add  15# bar 2x10 - - Landmine H.abd/add  20# bar  3x10 H Abd   7# cable  3x10 ea;  H Add  3# cable  3x10a ea -   Dead Lift    35# bar  3x15 -  35# bar  3x15 - -   Biceps Curl        20# bar  2x15   Reactive Control/Coordination      Small ball catch  Double hand, single hand x5' - Small ball toss x60+   -  Treatment/Session Summary:    · Response to Treatment: Continues with very good performance of the exercises. · Communication/Consultation:  None today  · Equipment provided today:  None today  · Recommendations/Intent for next treatment session: We will continue with controlled shoulder girdle re-strengthening.      Total Treatment Billable Duration: 45 minutes  PT Patient Time In/Time Out  Time In: 1940  Time Out: Dell Sanford 64, PT    Future Appointments   Date Time Provider Asuncion Mccormick   7/6/2020  9:00 AM Justyna Dawson, PT [facility] 7/8/2020  9:00 AM Rheta Mickie, PT [facility]   7/15/2020  9:00 AM Rheta Mickie, PT [facility]   7/20/2020  9:00 AM Rheta Mickie, PT [facility]   7/22/2020  9:00 AM Rheta Mickie, PT [facility]   7/27/2020  9:00 AM Rheta Mickie, PT [facility]   7/29/2020  9:00 AM Rheta Mickie, PT [facility]

## 2020-07-06 ENCOUNTER — HOSPITAL ENCOUNTER (OUTPATIENT)
Dept: PHYSICAL THERAPY | Age: 60
Discharge: HOME | End: 2020-07-06
Payer: COMMERCIAL

## 2020-07-06 PROCEDURE — 97110 THERAPEUTIC EXERCISES: CPT

## 2020-07-08 ENCOUNTER — HOSPITAL ENCOUNTER (OUTPATIENT)
Dept: PHYSICAL THERAPY | Age: 60
Discharge: HOME | End: 2020-07-08
Payer: COMMERCIAL

## 2020-07-08 PROCEDURE — 97110 THERAPEUTIC EXERCISES: CPT

## 2020-07-08 NOTE — THERAPY RECERTIFICATION
James Dohertysiddhartha  : 1960  Primary: Sc [employer], In*  Secondary:  [address]  at [facility] DULCE ANGUIANO  [address] [facility], [address] [place] [address],[address], [facility] [address]  [phone]   [phone]       OUTPATIENT PHYSICAL [address]     ICD-10: Treatment Diagnosis: Pain in left shoulder (M25.512); Presence of left artificial shoulder joint (J20.862)  Precautions/Allergies: Post op precautions. From EMR: Codeine   TREATMENT PLAN:  Effective Dates: 2020 TO 2020 (30 days). Frequency/Duration: 2 times a week for 30 additional Days to next MD follow up. MEDICAL/REFERRING DIAGNOSIS:s/p L shoulder I&D, removal of [place] implant, revision reverse TSA, biceps tendonesis, lat dorsi and teres major tendon transfer  L shoulder   DATE OF ONSET: 20 surgery  REFERRING PHYSICIAN: Ronnie Mcguire MD MD Orders: Eval and Treat; HEP; ROM; Strength; \"full motion/full strength\" (20)  Return MD Appointment: 06     RE-CERTIFICATION ASSESSMENT:  Ms. Juanjo Clarke has attended 39 visits to date. She is now 5 months s/p Incision, debridement, removal of implant, left shoulder \"[place]\" reverse left total shoulder arthroplasty with a proximal humeral osteotomy and revision reverse L total shoulder arthroplasty with a long stem Delta Xtend prosthesis, biceps tenodesis, latissimus dorsi and teres major tendon transfer. She continues to make excellent progress. Treatments have focused on strength and control progressions. She does not have significant pain complaints. She has good shoulder ROM both actively and passively. Strength continues to progress for functional ranges and use. Rotational control and anterior deltoid weakness is improving, but still most limiting with lifting control. Functionally, she reports improved use of her L UE with normal daily activities, and her DASH score has significantly improved. She has nearly met her discharge goals.  She will benefit from continued PT for controlled, progressive re-strengthening. We will consider discharge her to her HEP after this certification period. PROBLEM LIST (Impacting functional limitations):   1. Weakness L shoulder INTERVENTIONS PLANNED: (Treatment may consist of any combination of the following)   1. Therapeutic exercises and HEP for strength     GOALS: (Goals have been discussed and agreed upon with patient.)  Short-Term Functional Goals: Time Frame:   1. Report no more than 4/10 intermittent pain to L shoulder with compensatory use during basic functional activities, and score less than 40% on the DASH. MET 4/6/2020  2. L shoulder PROM forward elevation greater than 150 degrees to progress functional ranges. Met, 4/22/20  3. Demonstrate good L shoulder/deltoid isometric strength with manual testing to progress into strength phase. MET 4/6/20  4. Independent with initial HEP. Met, 4/22/20  Discharge Goals: Time Frame:4 additional weeks  1. No more than 3/10 intermittent pain L shoulder with return to normalized household and work activities, and score less than 25% on the DASH. Met 7/8/20  2. L shoulder AROM forward elevation greater than 135 degrees for use with normalized ADL and work activities. MET 4/6/20  3. Demonstrate good functional shoulder strength and endurance for return to normalized household and work activities. Ongoing, progressing 7/8/20  4. Independent with advanced shoulder HEP for continued self-management.  Ongoing, progressing 7/8/20    Updated Objective Findings:  ROM:   LUE AROM  L Shoulder Flexion: 160(forward elevation)  L Shoulder ABduction: 160  L Shoulder Internal Rotation: (to T6 behind back)  L Shoulder External Rotation: 80(by side, 60 at 90 deg abd)                 Strength:   L Shoulder: Flex=5-, Abd=5, Ext=5, ER 4+, IR=4   L Elbow, Forearm, wrist and hand grossly 5 with manual testing           OUTCOME MEASURE:   Tool Used: Disabilities of the Arm, Shoulder and Hand (DASH) Questionnaire - Quick Version  Score:  Initial: 45/55 or 77% disability 28/55 or 39% limited  (Date: 4/6/20 ) 26/55 or 34% disability (4/22/20) 26/55 or 34% disability (6/1/20) Most Recent: 20/55 or 20% disability (7/8/20)   Interpretation of Score: The DASH is designed to measure the activities of daily living in person's with upper extremity dysfunction or pain.  Each section is scored on a 1-5 scale, 5 representing the greatest disability.  The scores of each section are added together for a total score of 55.  This number is divided by 11, followed by subtracting 1 and multiplying by 25 to get a percent score of disability. This value represents the percentage disability: 0-20% minimal disability; 20-40% moderate disability; 40-60% severe disability; % dependent for care or exaggerated symptom behavior.  Minimal detectable change is 12%. MEDICAL NECESSITY:   · Patient is expected to demonstrate progress in strength and range of motion to progress to funcitonal use of her L/non-dominant UE. · 5 months post op revision reverse TSA  · Current impairments limiting use of L/non-dominant UE  REASON FOR SERVICES/OTHER COMMENTS:  · Patient continues to require skilled intervention due to the complexity of the surgical procedure and need for safe, skilled progressive post op rehab to return to functinoal use of the L/non-dominat UE. Rehabilitation Potential For Stated Goals: Good to Excellent  Regarding Jose Francis's therapy, I certify that the treatment plan above will be carried out by a therapist or under their direction.   Thank you for this referral,    Saranya Bocanegra, PT, MSPT, OCS       Referring Physician Signature: Saige Kelley NP _______________________________ Date _____________

## 2020-07-08 NOTE — PROGRESS NOTES
Mansoor Overall  : 1960  Payor: PLANNED [facility], [address] [place] [address] / Plan: [place] PLANNED Luna Silva. / Product Type: Commerical /  [address]  at [facility] DULCE ANGUIANO  [address] [facility], [address], [place] [address].  [phone]   [phone]       OUTPATIENT PHYSICAL THERAPY: Daily Treatment Note 2020  Visit Count: 36     ICD-10: Treatment Diagnosis: Pain in left shoulder (M25.512); Presence of left artificial shoulder joint (M43.214)  Precautions/Allergies: Post op precautions. From EMR: Codeine   TREATMENT PLAN:  Effective Dates: 2020 TO 2020 (30 days). Frequency/Duration: 2 times a week for 30 additional Days to next MD follow up. MEDICAL/REFERRING DIAGNOSIS:s/p L shoulder I&D, removal of Dahiana implant, revision reverse TSA, biceps tendonesis, lat dorsi and teres major tendon transfer  L shoulder   DATE OF ONSET: 20 surgery  REFERRING PHYSICIAN: Pam Mcguire MD MD Orders: Eval and Treat; HEP; ROM; Strength; \"continue PT\" (20)  Return MD Appointment: 8/3/20       Mansoor Overall was cleared by the screener to enter the clinic this morning. Pre-treatment Symptoms/Complaints: Says she is doing well. She was sore after last time. No significant pain this morning. Pain: Initial:   no pain now.   Post Session: no complaints of pain   Medications Last Reviewed: 20    Updated Objective Findings:   ROM:   LUE AROM  L Shoulder Flexion: 160(forward elevation)  L Shoulder ABduction: 160  L Shoulder Internal Rotation: (to T6 behind back)  L Shoulder External Rotation: 80(by side, 60 at 90 deg abd)                 Strength:   L Shoulder: Flex=5-, Abd=5, Ext=5, ER 4+, IR=4   L Elbow, Forearm, wrist and hand grossly 5 with manual testing           OUTCOME MEASURE:   Tool Used: Disabilities of the Arm, Shoulder and Hand (DASH) Questionnaire - Quick Version  Score:  Initial: 45/55 or 77% disability 28/55 or 39% limited  (Date: 20 ) 26/55 or 34% disability (4/22/20) 26/55 or 34% disability (6/1/20) Most Recent: 20/55 or 20% disability (7/8/20)   Interpretation of Score: The DASH is designed to measure the activities of daily living in person's with upper extremity dysfunction or pain.  Each section is scored on a 1-5 scale, 5 representing the greatest disability.  The scores of each section are added together for a total score of 55.  This number is divided by 11, followed by subtracting 1 and multiplying by 25 to get a percent score of disability. This value represents the percentage disability: 0-20% minimal disability; 20-40% moderate disability; 40-60% severe disability; % dependent for care or exaggerated symptom behavior.  Minimal detectable change is 12%. TREATMENT:     Active Warm up on UBE x10' with alternating double arm and single arm each minute. Therapeutic Exercise: (45 Minutes): Exercises for shoulder and shoulder girdle strength and stabilization as per grid. Exercises modified as indicated. Minimal cues for exercises technique and movement mechanics. HEP: No new HEP.    Date  6/1/20 Date  6/3/20 Date  6/10/20 Date  6/12/20 Date  6/15/20 Date  6/17/20 Date  6/29/20 Date  7/1/20 Date  7/6/20 Date  7/8/20   Activity/Exercise             UBE L 3 x10' L. 3 x10' L. 3 x10' L. 3 x10' L. 3 x10' L. 3 x10' L. 3 x10' L. 3.5  x10' L. 3.5 x10' L. 3.5 x10'   Pulleys - - - - - D/C       AROM - - - - - D/C       Serratus press - Standing dynamic hug  Blue 2x15 - Standing dynamic hug   Black   3x12 - Standing dynamic hug   Black   3x10-12 Cable press  7# 3x10 - - -   Shoulder Ext - Sitting reverse ext  7# cable  3x10 ea;  pull down, underhand  20# 3x15 Pull down, bilat underhand  17# x10  23# x10  27# x10 Reverse ext  10#  Cable 1x20 Pull down, bilat underhand  27# 3x10 - - -- Horiz add  3# cable  2x15 -   Middle trapezius - Standing, bilat horiz abd   Red 3x10 - Bent over  3# 1x20 - Bent over   3# 1x20  - - horiz abd  7# cable   2x15 -   Lower trapezius - Standing bilat \"Y\"  Red 2x10 - Bent over   3# 1x20 - Bent over   3# 1x20 - - Standing billat \"Y\"  Green 2x10 AAROM concentrics -   Scapular retractions Landmine Row  17.5# bar  2x15 ea   Landmine Row  17.5# bar  2x15 ea Landmine Row  17.5# bar  3x15 ea Rope Pull  10# cable  2x10 rep Landmine Row  20# bar  3x12 ea Rope Pull  10# cable  3x2-3 Cable row  10# 3x10  - - Bent row  20# bar  3x10   Shoulder ER - - Standing,   by side single red  3 x10 - - - - - - -   Forward Elevation Landmine press  15# bar  3x10;  Standing overhead modified clean  15# bar 1x5,  Clean and press  15# bar  1x10 Landmine press  15# bar  3x10 ea Landmine press  17.5# bar  3x10;  Standing   Clean and press  15# bar   3x10 Wall slide+lift off  Red 3x10 Landmine press  20# bar  3x10  Wall slide+lift off  Red 3x10 - Overhead press  20# bar   2x10-13; Forward raise to 90-deg  3# 2x15; Forward reach wrist roll  2.5# 1x6 - Overhead press  20# bar   3x10; Forward raise to 90-deg  3# 3x10;   Forward reach wrist roll  2.5# 2x6   Abduction Standing upright row   15# bar  1x10 - Standing upright row   15# bar  1x10 - - - - Straight arm fly  3# 2x15;  Bent arm fly  2# 2x15;  Upright row  20# bar  2x15 - Upright row  20# bar   3x10;  Straight arm fly  3# 3x10;  Bent arm fly  2# 2x12   Diagonal Flexion D1  Single yellow  2x15;  D2   Single blue  2x15 - D1   Single red  3x10;  D2  Green  3x10 - D1:   Single yellow  3x10;  D2:  single red  3x10 - D1:  3# cable  2x10;    D2:  3# cable  2x10 - D1:  3# cable  2x10;  D2:  3# cable  2x10 -   Diagonal Extension D1  Single blue  2x15;  D2  Single yellow  2x15 - - - D1:  Single blue  X15, double blue 3x12;  D2:  Single yellow  1x15, double yellow  3x12 - D1:  7# cable  3x10 ea;    D2:  7# cable  3x10 ea - D1:  7# cable  2x15 ea;  D2:  7# cable  2x15 ea -   Internal rotation - - Standing,   by side single red  3 x10  - - - - Ball squeeze  5-6\" 2x10 -   Ball squeeze  5-6\" 2x12   Rhythmic Stabilization - - - Red flex bar scaption, slow  2x5 ea,  D2 to 90-deg flex x5 Wall dribble   500-g ball  12 to 6 o'clock  1x30 ea Wall dribble   500-g ball  12 to 6 o'clock  2x40-50 ea;  Red flex bar scaption, slow  3x5 ea Wall dribble   500-g ball  12 to 6 o'clock  1x50 ea;  Blue flex bar scaption, slow  3x5 ea Blue flex bar, 5 ways   1x5 ea; Ball Drops, 3-ways standing,   500-g ball  1x30 ea    Blue flex bar, 5 ways   1x5 ea; Ball Drops, 3-ways standing 90-deg,   500-g ball  1x30 ea,   Bent elbow  Rubber ball 3x20 abd and flex position Blue flex bar AROM, 5 ways   1x5 ea, static 90 deg flex, ER by side, ER 90 deg abd 2x30\" ea    Closed kinetic chain--UE Tall plank  1x10\"; Plank on elbows   1x10\"; Side plank on knees 3x5\"; Push up plantigrade  3x5, U/LE oppositesin plantigrade 3x2 3 ea - - Crawl fwd/back 1x10-ft ea;  Bear walk fwd 3x10-ft; Ab roller  3x6 Tall plank to downward dog 2x5 Bear walk fwd 4x10-ft; Stability ball UE walk-out, fwd/back 2x4 steps; Ab roller  3x5 Tall plank to downward dog 1x10;  Plank on knee+alt UE lift 2x10 ea - - Tall plank to downward dog 2x10;  Shoulder tap from knee 2x10 ea; Stability ball UE walk-out, fwd/back 2x4 steps       Horiz abd/add   Landmine H.abd/add  15# bar 2x10 - - Landmine H.abd/add  20# bar  3x10 H Abd   7# cable  3x10 ea;  H Add  3# cable  3x10a ea - H Abd   7# cable  2x15 ea;  H Add  3# cable  3x10 ea -   Dead Lift    35# bar  3x15 -  35# bar  3x15 - - 40# 2x15 -   Biceps Curl        20# bar  2x15 - 20# bar   3x10   Reactive Control/Coordination      Small ball catch  Double hand, single hand x5' - Small ball toss x60+ - -   -  Treatment/Session Summary:    · Response to Treatment: Very good performance of the exercises. Shoulder strength and control is improving. Anterior shoulder and rotational control and weakness continue to be main impairment.   · Communication/Consultation:  None today  · Equipment provided today:  None today  · Recommendations/Intent for next treatment session: We will continue with controlled shoulder girdle re-strengthening.      Total Treatment Billable Duration: 45 minutes  PT Patient Time In/Time Out  Time In: 0102  Time Out: Panda Rodrigues PT    Future Appointments   Date Time Provider Asuncion Mccormick   7/13/2020  4:00 PM Fuentes Romiine, PT [facility]   7/15/2020  9:00 AM Fuentes Meline, PT [facility]   7/20/2020  9:00 AM Fuentes Meline, PT [facility]   7/22/2020  9:00 AM Fuentes Meline, PT [facility]   7/27/2020  9:00 AM Fuentes Meline, PT [facility]   7/29/2020  9:00 AM Fuentes Meline, PT [facility]

## 2020-07-13 ENCOUNTER — HOSPITAL ENCOUNTER (OUTPATIENT)
Dept: PHYSICAL THERAPY | Age: 60
Discharge: HOME | End: 2020-07-13
Payer: COMMERCIAL

## 2020-07-13 PROCEDURE — 97110 THERAPEUTIC EXERCISES: CPT

## 2020-07-13 NOTE — PROGRESS NOTES
Taya Miller  : 1960  Payor: EBER [facility], [address] [place] [address] / Plan: [place] E[place] Galan. / Product Type: Commgerryl /  [address]  at [address] [place] [address] Rd  [address] [facility], [address], [address] [place] [address]  [phone]   [phone]       OUTPATIENT PHYSICAL THERAPY: Daily Treatment Note 2020  Visit Count: 37     ICD-10: Treatment Diagnosis: Pain in left shoulder (M25.512); Presence of left artificial shoulder joint (Q81.651)  Precautions/Allergies: Post op precautions. From EMR: Codeine   TREATMENT PLAN:  Effective Dates: 2020 TO 2020 (30 days). Frequency/Duration: 2 times a week for 30 additional Days to next MD follow up. MEDICAL/REFERRING DIAGNOSIS:s/p L shoulder I&D, removal of [place]iana implant, revision reverse TSA, biceps tendonesis, lat dorsi and teres major tendon transfer  L shoulder   DATE OF ONSET: 20 surgery  REFERRING PHYSICIAN: Ari Mcguire MD MD Orders: Eval and Treat; HEP; ROM; Strength; \"continue PT\" (20)  Return MD Appointment: 8/3/20       Taya Miller was cleared by the screener to enter the clinic this morning. Pre-treatment Symptoms/Complaints: Says she went to the gym on Friday and cleaned her garage on Saturday. Shoulder is a little sore today still. Pain: Initial:   No VAS. Post Session: no complaints of pain   Medications Last Reviewed: 20    Updated Objective Findings:   No new measure. TREATMENT:     Active Warm up on UBE x10' with alternating double arm and single arm each minute. Therapeutic Exercise: (45 Minutes): Exercises for shoulder and shoulder girdle strength and stabilization as per grid. Exercises modified as indicated. Minimal cues for exercises technique and movement mechanics. HEP: No new HEP.    Date  20 Date  6/3/20 Date  6/10/20 Date  20 Date  6/15/20 Date  20 Date  20 Date  20 Date  20 Date  20 Date  20   Activity/Exercise UBE L 3 x10' L. 3 x10' L. 3 x10' L. 3 x10' L. 3 x10' L. 3 x10' L. 3 x10' L. 3.5  x10' L. 3.5 x10' L. 3.5 x10' L. 3.5 x10'   Pulleys - - - - - D/C        AROM - - - - - D/C        Serratus press - Standing dynamic hug  Blue 2x15 - Standing dynamic hug   Black   3x12 - Standing dynamic hug   Black   3x10-12 Cable press  7# 3x10 - Horiz add  3# cable  2x15 - -   Shoulder Ext - Sitting reverse ext  7# cable  3x10 ea;  pull down, underhand  20# 3x15 Pull down, bilat underhand  17# x10  23# x10  27# x10 Reverse ext  10#  Cable 1x20 Pull down, bilat underhand  27# 3x10 - - -- - - Standing 10# cable  3x10   Middle trapezius - Standing, bilat horiz abd   Red 3x10 - Bent over  3# 1x20 - Bent over   3# 1x20  - - horiz abd  7# cable   2x15 - horiz abd  7# cable 3x10   Lower trapezius - Standing bilat \"Y\"  Red 2x10 - Bent over   3# 1x20 - Bent over   3# 1x20 - - Standing billat \"Y\"  Green 2x10 AAROM concentrics - Standing billat \"Y\"  Green 3x12   Scapular retractions Landmine Row  17.5# bar  2x15 ea   Landmine Row  17.5# bar  2x15 ea Landmine Row  17.5# bar  3x15 ea Rope Pull  10# cable  2x10 rep Landmine Row  20# bar  3x12 ea Rope Pull  10# cable  3x2-3 Cable row  10# 3x10  - - Bent row  20# bar  3x10 -   Shoulder ER - - Standing,   by side single red  3 x10 - - - - - - - -   Forward Elevation Landmine press  15# bar  3x10;  Standing overhead modified clean  15# bar 1x5,  Clean and press  15# bar  1x10 Landmine press  15# bar  3x10 ea Landmine press  17.5# bar  3x10;  Standing   Clean and press  15# bar   3x10 Wall slide+lift off  Red 3x10 Landmine press  20# bar  3x10  Wall slide+lift off  Red 3x10 - Overhead press  20# bar   2x10-13; Forward raise to 90-deg  3# 2x15; Forward reach wrist roll  2.5# 1x6 - Overhead press  20# bar   3x10; Forward raise to 90-deg  3# 3x10;   Forward reach wrist roll  2.5# 2x6 -   Abduction Standing upright row   15# bar  1x10 - Standing upright row   15# bar  1x10 - - - - Straight arm fly  3# 2x15;  Bent arm fly  2# 2x15;  Upright row  20# bar  2x15 - Upright row  20# bar   3x10;  Straight arm fly  3# 3x10;  Bent arm fly  2# 2x12 -   Diagonal Flexion D1  Single yellow  2x15;  D2   Single blue  2x15 - D1   Single red  3x10;  D2  Green  3x10 - D1:   Single yellow  3x10;  D2:  single red  3x10 - D1:  3# cable  2x10;    D2:  3# cable  2x10 - D1:  3# cable  2x10;  D2:  3# cable  2x10 - D1:  3# cable  2x10;  D2:  3# cable  2x10   Diagonal Extension D1  Single blue  2x15;  D2  Single yellow  2x15 - - - D1:  Single blue  X15, double blue 3x12;  D2:  Single yellow  1x15, double yellow  3x12 - D1:  7# cable  3x10 ea;    D2:  7# cable  3x10 ea - D1:  7# cable  2x15 ea;  D2:  7# cable  2x15 ea - -   Internal rotation - - Standing,   by side single red  3 x10  - - - - Ball squeeze  5-6\" 2x10 -   Ball squeeze  5-6\" 2x12 -   Rhythmic Stabilization - - - Red flex bar scaption, slow  2x5 ea,  D2 to 90-deg flex x5 Wall dribble   500-g ball  12 to 6 o'clock  1x30 ea Wall dribble   500-g ball  12 to 6 o'clock  2x40-50 ea;  Red flex bar scaption, slow  3x5 ea Wall dribble   500-g ball  12 to 6 o'clock  1x50 ea;  Blue flex bar scaption, slow  3x5 ea Blue flex bar, 5 ways   1x5 ea; Ball Drops, 3-ways standing,   500-g ball  1x30 ea    Blue flex bar, 5 ways   1x5 ea; Ball Drops, 3-ways standing 90-deg,   500-g ball  1x30 ea,   Bent elbow  Rubber ball 3x20 abd and flex position Blue flex bar AROM, 5 ways   1x5 ea, static 90 deg flex, ER by side, ER 90 deg abd 2x30\" ea  Blue flex bar AROM, 5 ways   1x5 ea, static 90 deg flex, ER by side, ER 90 deg abd 2x30\" ea   Closed kinetic chain--UE Tall plank  1x10\"; Plank on elbows   1x10\"; Side plank on knees 3x5\"; Push up plantigrade  3x5, U/LE oppositesin plantigrade 3x2 3 ea - - Crawl fwd/back 1x10-ft ea;  Bear walk fwd 3x10-ft; Ab roller  3x6 Tall plank to downward dog 2x5 Bear walk fwd 4x10-ft; Stability ball UE walk-out, fwd/back 2x4 steps;     Ab roller  3x5 Tall plank to downward dog 1x10;  Plank on knee+alt UE lift 2x10 ea - - Tall plank to downward dog 2x10;  Shoulder tap from knee 2x10 ea; Stability ball UE walk-out, fwd/back 2x4 steps     -   Horiz abd/add   Landmine H.abd/add  15# bar 2x10 - - Landmine H.abd/add  20# bar  3x10 H Abd   7# cable  3x10 ea;  H Add  3# cable  3x10a ea - H Abd   7# cable  2x15 ea;  H Add  3# cable  3x10 ea - -   Dead Lift    35# bar  3x15 -  35# bar  3x15 - - 40# 2x15 - -   Biceps Curl        20# bar  2x15 - 20# bar   3x10 -   Reactive Control/Coordination      Small ball catch  Double hand, single hand x5' - Small ball toss x60+ - - -   -  Treatment/Session Summary:    · Response to Treatment: Very good performance of the exercises. Improving humeral control with open chain exercises and improving anterior deltoid/anterior shoulder girdle strength. · Communication/Consultation:  None today  · Equipment provided today:  None today  · Recommendations/Intent for next treatment session: We will continue with controlled shoulder girdle re-strengthening.      Total Treatment Billable Duration: 45 minutes  PT Patient Time In/Time Out  Time In: 1600  Time Out: [address], PT    Future Appointments   Date Time Provider Asuncion Mccormick   7/15/2020  9:00 AM Tremayne Ragland PT PATRICIA HAYWOOD   7/20/2020  9:00 AM Tremayne Ragland, PT PATRICIA [facility]ILL[facility]РИНА[facility]   7/22/2020  9:00 AM Tremayne Ragland PT TIANORPRAFAELA MILLENNIUM   7/27/2020  2:00 PM Tremayne Ragland, PT [facility]   7/29/2020  9:00 AM Tremayne Ragland, PT [facility]

## 2020-07-15 ENCOUNTER — HOSPITAL ENCOUNTER (OUTPATIENT)
Dept: PHYSICAL THERAPY | Age: 60
Discharge: HOME | End: 2020-07-15
Payer: COMMERCIAL

## 2020-07-15 PROCEDURE — 97110 THERAPEUTIC EXERCISES: CPT

## 2020-07-15 NOTE — PROGRESS NOTES
Jeff Sheehan  : 1960  Payor: EBER HERRERA, [address] [place] [address] / [place] Jaimeanda Al. / Product Type: Commerical /  [address] [place]  at [facility] DULCE ANGUIANO  1101 [facility], [address], [facility].  [phone]   [phone]       OUTPATIENT PHYSICAL THERAPY: Daily Treatment Note 7/15/2020  Visit Count: 38     ICD-10: Treatment Diagnosis: Pain in left shoulder (M25.512); Presence of left artificial shoulder joint (D98.621)  Precautions/Allergies: Post op precautions. From EMR: Codeine   TREATMENT PLAN:  Effective Dates: 2020 TO 2020 (30 days). Frequency/Duration: 2 times a week for 30 additional Days to next MD follow up. MEDICAL/REFERRING DIAGNOSIS:s/p L shoulder I&D, removal of [place] implant, revision reverse TSA, biceps tendonesis, lat dorsi and teres major tendon transfer  L shoulder   DATE OF ONSET: 20 surgery  REFERRING PHYSICIAN: Mary Beth Mcguire MD MD Orders: Eval and Treat; HEP; ROM; Strength; \"continue PT\" (20)  Return MD Appointment: 8/3/20       Jeff Sheehan was cleared by the screener to enter the clinic this morning. Pre-treatment Symptoms/Complaints: Says her shoulder is doing good todayl. Pain: Initial:   No VAS. Post Session: no complaints of pain   Medications Last Reviewed: 20    Updated Objective Findings:   No new measure. TREATMENT:     Active Warm up on UBE x10' with alternating double arm and single arm each minute. Therapeutic Exercise: (40 Minutes): Exercises for shoulder and shoulder girdle strength and stabilization as per grid. Exercises modified as indicated. Minimal cues for exercises technique and movement mechanics, and min A with bar overhead press and scaption+IR/add resistance. HEP: No new HEP.    Date  20 Date  6/3/20 Date  6/10/20 Date  20 Date  6/15/20 Date  20 Date  20 Date  20 Date  20 Date  20 Date  20 Date  7/15/20   Activity/Exercise UBE L 3 x10' L. 3 x10' L. 3 x10' L. 3 x10' L. 3 x10' L. 3 x10' L. 3 x10' L. 3.5  x10' L. 3.5 x10' L. 3.5 x10' L. 3.5 x10' L. 3x5 x10'   Pulleys - - - - - D/C         AROM - - - - - D/C         Serratus press - Standing dynamic hug  Blue 2x15 - Standing dynamic hug   Black   3x12 - Standing dynamic hug   Black   3x10-12 Cable press  7# 3x10 - Horiz add  3# cable  2x15 - - -   Shoulder Ext - Sitting reverse ext  7# cable  3x10 ea;  pull down, underhand  20# 3x15 Pull down, bilat underhand  17# x10  23# x10  27# x10 Reverse ext  10#  Cable 1x20 Pull down, bilat underhand  27# 3x10 - - -- - - Standing 10# cable  3x10 -   Middle trapezius - Standing, bilat horiz abd   Red 3x10 - Bent over  3# 1x20 - Bent over   3# 1x20  - - horiz abd  7# cable   2x15 - horiz abd  7# cable 3x10 -   Lower trapezius - Standing bilat \"Y\"  Red 2x10 - Bent over   3# 1x20 - Bent over   3# 1x20 - - Standing billat \"Y\"  Green 2x10 AAROM concentrics - Standing billat \"Y\"  Green 3x12 -   Scapular retractions Landmine Row  17.5# bar  2x15 ea   Landmine Row  17.5# bar  2x15 ea Landmine Row  17.5# bar  3x15 ea Rope Pull  10# cable  2x10 rep Landmine Row  20# bar  3x12 ea Rope Pull  10# cable  3x2-3 Cable row  10# 3x10  - - Bent row  20# bar  3x10 - -   Shoulder ER - - Standing,   by side single red  3 x10 - - - - - - - - -   Forward Elevation Landmine press  15# bar  3x10;  Standing overhead modified clean  15# bar 1x5,  Clean and press  15# bar  1x10 Landmine press  15# bar  3x10 ea Landmine press  17.5# bar  3x10;  Standing   Clean and press  15# bar   3x10 Wall slide+lift off  Red 3x10 Landmine press  20# bar  3x10  Wall slide+lift off  Red 3x10 - Overhead press  20# bar   2x10-13; Forward raise to 90-deg  3# 2x15; Forward reach wrist roll  2.5# 1x6 - Overhead press  20# bar   3x10; Forward raise to 90-deg  3# 3x10;   Forward reach wrist roll  2.5# 2x6 - Overhead press  20# bar   3x10;  Scaption+ER/abd stab green 3x10,  Scaption+IR/add stab  Green 3x10    Abduction Standing upright row   15# bar  1x10 - Standing upright row   15# bar  1x10 - - - - Straight arm fly  3# 2x15;  Bent arm fly  2# 2x15;  Upright row  20# bar  2x15 - Upright row  20# bar   3x10;  Straight arm fly  3# 3x10;  Bent arm fly  2# 2x12 - Upright row  20# bar   3x10;   Diagonal Flexion D1  Single yellow  2x15;  D2   Single blue  2x15 - D1   Single red  3x10;  D2  Green  3x10 - D1:   Single yellow  3x10;  D2:  single red  3x10 - D1:  3# cable  2x10;    D2:  3# cable  2x10 - D1:  3# cable  2x10;  D2:  3# cable  2x10 - D1:  3# cable  2x10;  D2:  3# cable  2x10 -   Diagonal Extension D1  Single blue  2x15;  D2  Single yellow  2x15 - - - D1:  Single blue  X15, double blue 3x12;  D2:  Single yellow  1x15, double yellow  3x12 - D1:  7# cable  3x10 ea;    D2:  7# cable  3x10 ea - D1:  7# cable  2x15 ea;  D2:  7# cable  2x15 ea - - D1:  10# cable  3x10 ea;  D2:  10# cable  3x10 ea   Internal rotation - - Standing,   by side single red  3 x10  - - - - Ball squeeze  5-6\" 2x10 -   Ball squeeze  5-6\" 2x12 - -   Rhythmic Stabilization - - - Red flex bar scaption, slow  2x5 ea,  D2 to 90-deg flex x5 Wall dribble   500-g ball  12 to 6 o'clock  1x30 ea Wall dribble   500-g ball  12 to 6 o'clock  2x40-50 ea;  Red flex bar scaption, slow  3x5 ea Wall dribble   500-g ball  12 to 6 o'clock  1x50 ea;  Blue flex bar scaption, slow  3x5 ea Blue flex bar, 5 ways   1x5 ea; Ball Drops, 3-ways standing,   500-g ball  1x30 ea    Blue flex bar, 5 ways   1x5 ea; Ball Drops, 3-ways standing 90-deg,   500-g ball  1x30 ea,   Bent elbow  Rubber ball 3x20 abd and flex position Blue flex bar AROM, 5 ways   1x5 ea, static 90 deg flex, ER by side, ER 90 deg abd 2x30\" ea  Blue flex bar AROM, 5 ways   1x5 ea, static 90 deg flex, ER by side, ER 90 deg abd 2x30\" ea Blue flex bar AROM, 5 ways   1x5 ea, static 90 deg flex, ER by side, ER 90 deg abd 2x30\" ea   Closed kinetic chain--UE Tall plank  1x10\";    Plank on elbows   1x10\"; Side plank on knees 3x5\"; Push up plantigrade  3x5, U/LE oppositesin plantigrade 3x2 3 ea - - Crawl fwd/back 1x10-ft ea;  Bear walk fwd 3x10-ft; Ab roller  3x6 Tall plank to downward dog 2x5 Bear walk fwd 4x10-ft; Stability ball UE walk-out, fwd/back 2x4 steps; Ab roller  3x5 Tall plank to downward dog 1x10;  Plank on knee+alt UE lift 2x10 ea - - Tall plank to downward dog 2x10;  Shoulder tap from knee 2x10 ea; Stability ball UE walk-out, fwd/back 2x4 steps     - -   Horiz abd/add   Landmine H.abd/add  15# bar 2x10 - - Landmine H.abd/add  20# bar  3x10 H Abd   7# cable  3x10 ea;  H Add  3# cable  3x10a ea - H Abd   7# cable  2x15 ea;  H Add  3# cable  3x10 ea - - -   Dead Lift    35# bar  3x15 -  35# bar  3x15 - - 40# 2x15 - - 40# bar   3x10   Biceps Curl        20# bar  2x15 - 20# bar   3x10 - 20# bar   3x10   Reactive Control/Coordination      Small ball catch  Double hand, single hand x5' - Small ball toss x60+ - - - -   -  Treatment/Session Summary:    · Response to Treatment: Very good performance of the exercises. More difficulty with scaption plus resisted IR/add control. · Communication/Consultation:  None today  · Equipment provided today:  None today  · Recommendations/Intent for next treatment session: We will continue with controlled shoulder girdle re-strengthening.      Total Treatment Billable Duration: 40 minutes  PT Patient Time In/Time Out  Time In: 0900  Time Out: Panda Rodrigues PT    Future Appointments   Date Time Provider Asuncion Mccormick   7/20/2020  9:00 AM Barbi Hidalgo, PT [facility]   7/22/2020  9:00 AM Yoni Schaeffer, PT [facility]   7/27/2020  2:00 PM Yoni Schaeffer, PT [facility]ON[facility]   7/29/2020  9:00 AM Yoni Schaeffer, PT PATRICIA [facility]

## 2020-07-20 ENCOUNTER — HOSPITAL ENCOUNTER (OUTPATIENT)
Dept: PHYSICAL THERAPY | Age: 60
Discharge: HOME | End: 2020-07-20
Payer: COMMERCIAL

## 2020-07-20 PROCEDURE — 97110 THERAPEUTIC EXERCISES: CPT

## 2020-07-20 NOTE — PROGRESS NOTES
Zelalem Salas  : 1960  Payor: PLANNED [facility], [address] [place] [address] / Plan: [place] PLANNED Alannah Flavors. / Product Type: Commerical /  [address]  at [facility] DULCE ANGUIANO  [address] [facility], [address], [facility].  [phone]   [phone]       OUTPATIENT PHYSICAL THERAPY: Daily Treatment Note 2020  Visit Count: 39     ICD-10: Treatment Diagnosis: Pain in left shoulder (M25.512); Presence of left artificial shoulder joint (I27.105)  Precautions/Allergies: Post op precautions. From EMR: Codeine   TREATMENT PLAN:  Effective Dates: 2020 TO 2020 (30 days). Frequency/Duration: 2 times a week for 30 additional Days to next MD follow up. MEDICAL/REFERRING DIAGNOSIS:s/p L shoulder I&D, removal of [place] implant, revision reverse TSA, biceps tendonesis, lat dorsi and teres major tendon transfer  L shoulder   DATE OF ONSET: 20 surgery  REFERRING PHYSICIAN: Nicolas Mcguire MD MD Orders: Eval and Treat; HEP; ROM; Strength; \"continue PT\" (20)  Return MD Appointment: 8/3/20       Zelalem Salas was cleared by the screener to enter the clinic this morning. Pre-treatment Symptoms/Complaints: Says her shoulder is doing good. Did a lot of work around the house without issue. Only thing she really has trouble with is holding up longer hair when cutting it. Pain: Initial:   No VAS. Post Session: no complaints of pain   Medications Last Reviewed: 20    Updated Objective Findings:   No new measure. TREATMENT:     Active Warm up on UBE x10' with alternating double arm and single arm each minute. Therapeutic Exercise: (45 Minutes): Exercises for shoulder and shoulder girdle strength and stabilization as per grid. Exercises modified as indicated. Minimal cues for exercises technique and movement mechanics, and min A with bar overhead press and scaption+IR/add resistance. HEP: No new HEP.    Date  20 Date  20 Date  20 Date  20 Date  7/15/20 Date  7/20/20   Activity/Exercise         UBE L. 3.5  x10' L. 3.5 x10' L. 3.5 x10' L. 3.5 x10' L. 3x5 x10' L. 3x5 x10'   Serratus press - Horiz add  3# cable  2x15 - - - Horiz add+press combo  7# cable   3x10 ea   Shoulder Ext -- - - Standing 10# cable  3x10 - Standing 10# cable  3x10   Middle trapezius - horiz abd  7# cable   2x15 - horiz abd  7# cable 3x10 - horiz abd+mid trap step out combo  7# cable 3x10 ea   Lower trapezius - Standing billat \"Y\"  Green 2x10 AAROM concentrics - Standing billat \"Y\"  Green 3x12 - Standing unilat \"Y\"  3# cable   3x10 AAROM concentric   Scapular retractions - - Bent row  20# bar  3x10 - - -   Shoulder ER - - - - - -   Forward Elevation Overhead press  20# bar   2x10-13; Forward raise to 90-deg  3# 2x15; Forward reach wrist roll  2.5# 1x6 - Overhead press  20# bar   3x10; Forward raise to 90-deg  3# 3x10; Forward reach wrist roll  2.5# 2x6 - Overhead press  20# bar   3x10;  Scaption+ER/abd stab green 3x10,  Scaption+IR/add stab  Green 3x10  -   Abduction Straight arm fly  3# 2x15;  Bent arm fly  2# 2x15;  Upright row  20# bar  2x15 - Upright row  20# bar   3x10;  Straight arm fly  3# 3x10;  Bent arm fly  2# 2x12 - Upright row  20# bar   3x10; -   Diagonal Flexion - D1:  3# cable  2x10;  D2:  3# cable  2x10 - D1:  3# cable  2x10;  D2:  3# cable  2x10 - -   Diagonal Extension - D1:  7# cable  2x15 ea;  D2:  7# cable  2x15 ea - - D1:  10# cable  3x10 ea;  D2:  10# cable  3x10 ea -   Internal rotation Ball squeeze  5-6\" 2x10 -   Ball squeeze  5-6\" 2x12 - - Ball squeeze+reach    2x 5+5 with perterbation   Rhythmic Stabilization Blue flex bar, 5 ways   1x5 ea; Ball Drops, 3-ways standing,   500-g ball  1x30 ea    Blue flex bar, 5 ways   1x5 ea;   Ball Drops, 3-ways standing 90-deg,   500-g ball  1x30 ea,   Bent elbow  Rubber ball 3x20 abd and flex position Blue flex bar AROM, 5 ways   1x5 ea, static 90 deg flex, ER by side, ER 90 deg abd 2x30\" ea  Blue flex bar AROM, 5 ways   1x5 ea, static 90 deg flex, ER by side, ER 90 deg abd 2x30\" ea Blue flex bar AROM, 5 ways   1x5 ea, static 90 deg flex, ER by side, ER 90 deg abd 2x30\" ea Blue flex bar AROM, 5 ways   1x5 ea, static 90 deg flex, ER by side, ER 90 deg abd 2x30\" ea   Closed kinetic chain--UE - - Tall plank to downward dog 2x10;  Shoulder tap from knee 2x10 ea; Stability ball UE walk-out, fwd/back 2x4 steps     - - Tall plank UE lift  2x5 ea   Horiz abd/add - H Abd   7# cable  2x15 ea;  H Add  3# cable  3x10 ea - - - -   Dead Lift - 40# 2x15 - - 40# bar   3x10 -   Biceps Curl 20# bar  2x15 - 20# bar   3x10 - 20# bar   3x10 -   Reactive Control/Coordination Small ball toss x60+ - - - - Small ball toss x80; Ball dribble     Treatment/Session Summary:    · Response to Treatment: Very good performance of the exercises. More difficulty with scaption plus resisted IR/add control. · Communication/Consultation:  None today  · Equipment provided today:  None today  · Recommendations/Intent for next treatment session: We will continue with controlled shoulder girdle re-strengthening.      Total Treatment Billable Duration: 45 minutes  PT Patient Time In/Time Out  Time In: 2051  Time Out: 1200 [facility],     Future Appointments   Date Time Provider Asuncion Mccormick   7/22/2020  9:00 AM Alicia Beauchamp, PT [facility]   7/27/2020  2:00 PM Caitlin Course, PT [facility]   7/29/2020  9:00 AM Caitlin Course, PT [facility]   8/10/2020 10:00 AM Caitlin Course, PT [facility]   8/12/2020  9:00 AM Caitlin Course, PT [facility]

## 2020-07-22 ENCOUNTER — HOSPITAL ENCOUNTER (OUTPATIENT)
Dept: PHYSICAL THERAPY | Age: 60
Discharge: HOME | End: 2020-07-22
Payer: COMMERCIAL

## 2020-07-22 PROCEDURE — 97110 THERAPEUTIC EXERCISES: CPT

## 2020-07-22 NOTE — PROGRESS NOTES
Jeff Sheehan  : 1960  Payor: EBER [facility], [address] [place] [address] / Plan: [place] E[place] Jaimetabatha Melendez. / Product Type: Commerical /  [address]  at [facility] DULCE ANGUIANO  [address] [place] [address], [address], [address] [facility]  [phone]   [phone]       OUTPATIENT PHYSICAL THERAPY: Daily Treatment Note 2020  Visit Count: 40     ICD-10: Treatment Diagnosis: Pain in left shoulder (M25.512); Presence of left artificial shoulder joint (P49.284)  Precautions/Allergies: Post op precautions. From EMR: Codeine   TREATMENT PLAN:  Effective Dates: 2020 TO 2020 (30 days). Frequency/Duration: 2 times a week for 30 additional Days to next MD follow up. MEDICAL/REFERRING DIAGNOSIS:s/p L shoulder I&D, removal of [place] implant, revision reverse TSA, biceps tendonesis, lat dorsi and teres major tendon transfer  L shoulder   DATE OF ONSET: 20 surgery  REFERRING PHYSICIAN: Mary Beth Mcguire MD MD Orders: Eval and Treat; HEP; ROM; Strength (20)  Return MD Appointment: 8/3/20       Jeff Sheehan was cleared by the screener to enter the clinic this morning. A new order from Dr. Kenan Villalta was received by fax to continue PT x2 weeks til her next follow up. Pre-treatment Symptoms/Complaints: Says her shoulder was a little sore yesterday. And she ran into a doorway hitting the L shoulder which made it hurt. Soreness is better today. Pain: Initial:   No VAS. Post Session: no complaints of pain   Medications Last Reviewed: 20    Updated Objective Findings:   No new measure. TREATMENT:     Active Warm up on UBE x10' with alternating double arm and single arm each minute. Therapeutic Exercise: (50 Minutes): Exercises for shoulder and shoulder girdle strength and stabilization as per grid. Exercises modified as indicated. Minimal cues for exercises technique and movement mechanics, and min A with bar overhead press and scaption+IR/add resistance. HEP: No new HEP. Date  7/1/20 Date  7/6/20 Date  7/8/20 Date  7/13/20 Date  7/15/20 Date  7/20/20 Date  7/22/20   Activity/Exercise          UBE L. 3.5  x10' L. 3.5 x10' L. 3.5 x10' L. 3.5 x10' L. 3x5 x10' L. 3x5 x10' L. 3 x10'   Serratus press - Horiz add  3# cable  2x15 - - - Horiz add+press combo  7# cable   3x10 ea -   Shoulder Ext -- - - Standing 10# cable  3x10 - Standing 10# cable  3x10 -   Middle trapezius - horiz abd  7# cable   2x15 - horiz abd  7# cable 3x10 - horiz abd+mid trap step out combo  7# cable 3x10 ea Bent over, elbows flexed 90  2# 3x10   Lower trapezius - Standing billat \"Y\"  Green 2x10 AAROM concentrics - Standing billat \"Y\"  Green 3x12 - Standing unilat \"Y\"  3# cable   3x10 AAROM concentric -   Scapular retractions - - Bent row  20# bar  3x10 - - - Bent row  20# bar  3x15   Shoulder ER - - - - - - -   Forward Elevation Overhead press  20# bar   2x10-13; Forward raise to 90-deg  3# 2x15; Forward reach wrist roll  2.5# 1x6 - Overhead press  20# bar   3x10; Forward raise to 90-deg  3# 3x10; Forward reach wrist roll  2.5# 2x6 - Overhead press  20# bar   3x10;  Scaption+ER/abd stab green 3x10,  Scaption+IR/add stab  Green 3x10  - Overhead press  20# bar   3x10;   Forward raise to 90-deg  4# 3x10   Abduction Straight arm fly  3# 2x15;  Bent arm fly  2# 2x15;  Upright row  20# bar  2x15 - Upright row  20# bar   3x10;  Straight arm fly  3# 3x10;  Bent arm fly  2# 2x12 - Upright row  20# bar   3x10; - Upright row  20# bar   3x15;  Straight arm lateral fly  4# 3x10;  unilat upright row  Yellow 2x10   Diagonal Flexion - D1:  3# cable  2x10;  D2:  3# cable  2x10 - D1:  3# cable  2x10;  D2:  3# cable  2x10 - - D1   3# cable   2x10   Diagonal Extension - D1:  7# cable  2x15 ea;  D2:  7# cable  2x15 ea - - D1:  10# cable  3x10 ea;  D2:  10# cable  3x10 ea - -    Internal rotation Ball squeeze  5-6\" 2x10 -   Ball squeeze  5-6\" 2x12 - - Ball squeeze+reach    2x 5+5 with perterbation -   Rhythmic Stabilization Blue flex bar, 5 ways   1x5 ea; Ball Drops, 3-ways standing,   500-g ball  1x30 ea    Blue flex bar, 5 ways   1x5 ea; Ball Drops, 3-ways standing 90-deg,   500-g ball  1x30 ea,   Bent elbow  Rubber ball 3x20 abd and flex position Blue flex bar AROM, 5 ways   1x5 ea, static 90 deg flex, ER by side, ER 90 deg abd 2x30\" ea  Blue flex bar AROM, 5 ways   1x5 ea, static 90 deg flex, ER by side, ER 90 deg abd 2x30\" ea Blue flex bar AROM, 5 ways   1x5 ea, static 90 deg flex, ER by side, ER 90 deg abd 2x30\" ea Blue flex bar AROM, 5 ways   1x5 ea, static 90 deg flex, ER by side, ER 90 deg abd 2x30\" ea Blue flex bar AROM scaption +manual rhyth stab  4x5;  Quadruped manual alt iso and rhyth stab 3x20-30\"   Closed kinetic chain--UE - - Tall plank to downward dog 2x10;  Shoulder tap from knee 2x10 ea; Stability ball UE walk-out, fwd/back 2x4 steps     - - Tall plank UE lift  2x5 ea -   Horiz abd/add - H Abd   7# cable  2x15 ea;  H Add  3# cable  3x10 ea - - - - -   Dead Lift - 40# 2x15 - - 40# bar   3x10 - swing squat, double hand \"Kettle bell\" 8# 2x15   Biceps Curl 20# bar  2x15 - 20# bar   3x10 - 20# bar   3x10 - 20# bar   3x15   Triceps       Kick back  4# 3x15   Reactive Control/Coordination Small ball toss x60+ - - - - Small ball toss x80; Ball dribble -     Treatment/Session Summary:    · Response to Treatment: Continues with very good performance of the exercises. Working on [employer], stabilization control. Anterior shoulder and rotational control strength are weakest but getting better. · Communication/Consultation:  None today  · Equipment provided today:  None today  · Recommendations/Intent for next treatment session: We will continue with controlled shoulder girdle re-strengthening.      Total Treatment Billable Duration: 50 minutes  PT Patient Time In/Time Out  Time In: 0900  Time Out: Jacoby Velazquez 47, PT    Future Appointments   Date Time Provider Asuncion Mccormick   7/27/2020  2:00 PM Ab Mg Tonia Serra, PT [facility]   7/29/2020  9:00 AM Zoltan Torres, PT [facility]   8/10/2020 10:00 AM Zoltan Torres, PT [facility]   8/12/2020  9:00 AM Zoltan Torres, PT [facility]

## 2020-07-27 ENCOUNTER — HOSPITAL ENCOUNTER (OUTPATIENT)
Dept: PHYSICAL THERAPY | Age: 60
Discharge: HOME | End: 2020-07-27
Payer: COMMERCIAL

## 2020-07-27 PROCEDURE — 97110 THERAPEUTIC EXERCISES: CPT

## 2020-07-27 NOTE — PROGRESS NOTES
Kyle Fam  : 1960  Payor: EBER [facility], [address] [place] [address] / Plan: [place] PLANNED Irenekurt Orellana. / Product Type: Commerical /  [address]  at [facility] DULCE ANGUIANO  [address] [facility], [address], [address] [facility]  [phone]   [phone]       OUTPATIENT PHYSICAL THERAPY: Daily Treatment Note 2020  Visit Count: 41     ICD-10: Treatment Diagnosis: Pain in left shoulder (M25.512); Presence of left artificial shoulder joint (W26.181)  Precautions/Allergies: Post op precautions. From EMR: Codeine   TREATMENT PLAN:  Effective Dates: 2020 TO 2020 (30 days). Frequency/Duration: 2 times a week for 30 additional Days to next MD follow up. MEDICAL/REFERRING DIAGNOSIS:s/p L shoulder I&D, removal of [place]ahiana implant, revision reverse TSA, biceps tendonesis, lat dorsi and teres major tendon transfer  L shoulder   DATE OF ONSET: 20 surgery  REFERRING PHYSICIAN: Johanna Mcguire MD MD Orders: Eval and Treat; HEP; ROM; Strength (20)  Return MD Appointment: 8/3/20       Kyle Fam was cleared by the screener to enter the clinic this morning. A new order from Dr. Batool Porter was received by fax to continue PT x2 weeks til her next follow up. Pre-treatment Symptoms/Complaints: Says her shoulder was a little sore yesterday. And she ran into a doorway hitting the L shoulder which made it hurt. Soreness is better today. Pain: Initial:   No VAS. Post Session: no complaints of pain   Medications Last Reviewed: 20    Updated Objective Findings:   No new measure. TREATMENT:     Active Warm up on UBE x10' with alternating double arm and single arm each minute. Therapeutic Exercise: (45 Minutes): Exercises for shoulder and shoulder girdle strength and stabilization as per grid. Exercises modified as indicated. Minimal cues for exercises technique and movement mechanics, and min A with bar overhead press and scaption+IR/add resistance.     HEP: Provided ice for shoulder post treatment. No new HEP. Date  7/1/20 Date  7/6/20 Date  7/8/20 Date  7/13/20 Date  7/15/20 Date  7/20/20 Date  7/22/20 Date  7/27/20   Activity/Exercise           UBE L. 3.5  x10' L. 3.5 x10' L. 3.5 x10' L. 3.5 x10' L. 3x5 x10' L. 3x5 x10' L. 3 x10' L. 3 x10'   Serratus press - Horiz add  3# cable  2x15 - - - Horiz add+press combo  7# cable   3x10 ea - Incline press 45-deg  5# 3x10   Shoulder Ext -- - - Standing 10# cable  3x10 - Standing 10# cable  3x10 - Bent over, bilat  5# 3x10;  Lat pull down, underhand  27# 3x10   Middle trapezius - horiz abd  7# cable   2x15 - horiz abd  7# cable 3x10 - horiz abd+mid trap step out combo  7# cable 3x10 ea Bent over, elbows flexed 90  2# 3x10 -   Lower trapezius - Standing billat \"Y\"  Green 2x10 AAROM concentrics - Standing billat \"Y\"  Green 3x12 - Standing unilat \"Y\"  3# cable   3x10 AAROM concentric - bilat \"Y\" at wall, blue  3x10   Scapular retractions - - Bent row  20# bar  3x10 - - - Bent row  20# bar  3x15 [place] row  25# x15  30# x15  32. 5# x15     Shoulder ER - - - - - - - Static hold  Red 10\" x10   Forward Elevation Overhead press  20# bar   2x10-13; Forward raise to 90-deg  3# 2x15; Forward reach wrist roll  2.5# 1x6 - Overhead press  20# bar   3x10; Forward raise to 90-deg  3# 3x10; Forward reach wrist roll  2.5# 2x6 - Overhead press  20# bar   3x10;  Scaption+ER/abd stab green 3x10,  Scaption+IR/add stab  Green 3x10  - Overhead press  20# bar   3x10;   Forward raise to 90-deg  4# 3x10 Forward raise to 90-deg  5# 3x10;  [place] press+horiz abd/add 12.5# 2x10   Abduction Straight arm fly  3# 2x15;  Bent arm fly  2# 2x15;  Upright row  20# bar  2x15 - Upright row  20# bar   3x10;  Straight arm fly  3# 3x10;  Bent arm fly  2# 2x12 - Upright row  20# bar   3x10; - Upright row  20# bar   3x15;  Straight arm lateral fly  4# 3x10;  unilat upright row  Yellow 2x10 Straight arm lateral fly  5# 3x10   Diagonal Flexion - D1:  3# cable  2x10;  D2:  3# cable  2x10 - D1:  3# cable  2x10;  D2:  3# cable  2x10 - - D1   3# cable   2x10 -   Diagonal Extension - D1:  7# cable  2x15 ea;  D2:  7# cable  2x15 ea - - D1:  10# cable  3x10 ea;  D2:  10# cable  3x10 ea - -  -   Internal rotation Ball squeeze  5-6\" 2x10 -   Ball squeeze  5-6\" 2x12 - - Ball squeeze+reach    2x 5+5 with perterbation - -   Rhythmic Stabilization Blue flex bar, 5 ways   1x5 ea; Ball Drops, 3-ways standing,   500-g ball  1x30 ea    Blue flex bar, 5 ways   1x5 ea; Ball Drops, 3-ways standing 90-deg,   500-g ball  1x30 ea,   Bent elbow  Rubber ball 3x20 abd and flex position Blue flex bar AROM, 5 ways   1x5 ea, static 90 deg flex, ER by side, ER 90 deg abd 2x30\" ea  Blue flex bar AROM, 5 ways   1x5 ea, static 90 deg flex, ER by side, ER 90 deg abd 2x30\" ea Blue flex bar AROM, 5 ways   1x5 ea, static 90 deg flex, ER by side, ER 90 deg abd 2x30\" ea Blue flex bar AROM, 5 ways   1x5 ea, static 90 deg flex, ER by side, ER 90 deg abd 2x30\" ea Blue flex bar AROM scaption +manual rhyth stab  4x5;  Quadruped manual alt iso and rhyth stab 3x20-30\" Blue flex bar AROM 5 ways  3x5 ea;  static 90 deg flex, abd, ER 90 deg abd 2x30\" ea   Closed kinetic chain--UE - - Tall plank to downward dog 2x10;  Shoulder tap from knee 2x10 ea; Stability ball UE walk-out, fwd/back 2x4 steps     - - Tall plank UE lift  2x5 ea - -   Horiz abd/add - H Abd   7# cable  2x15 ea;  H Add  3# cable  3x10 ea - - - - - -   Dead Lift - 40# 2x15 - - 40# bar   3x10 - swing squat, double hand \"Kettle bell\" 8# 2x15 -   Biceps Curl 20# bar  2x15 - 20# bar   3x10 - 20# bar   3x10 - 20# bar   3x15 -   Triceps       Kick back  4# 3x15 -   Reactive Control/Coordination Small ball toss x60+ - - - - Small ball toss x80; Ball dribble - -     Treatment/Session Summary:    · Response to Treatment: Very good performance of the exercises. Difficulty with rotation control as resistance increase.    · Communication/Consultation:  None today  · Equipment provided today:  None today  · Recommendations/Intent for next treatment session: We will continue with controlled shoulder girdle re-strengthening.      Total Treatment Billable Duration: 45 minutes  PT Patient Time In/Time Out  Time In: 1400  Time Out: 500 Anastasia Malin, DAMEON    Future Appointments   Date Time Provider Asuncion Mccormick   7/29/2020  9:00 AM Padmini Fung, D[facility] [facility]   8/10/2020 10:00 AM Razia March, PT [facility]   8/12/2020  9:00 AM Razia March, PT [facility]

## 2020-07-29 ENCOUNTER — HOSPITAL ENCOUNTER (OUTPATIENT)
Dept: PHYSICAL THERAPY | Age: 60
Discharge: HOME | End: 2020-07-29
Payer: COMMERCIAL

## 2020-07-29 PROCEDURE — 97110 THERAPEUTIC EXERCISES: CPT

## 2020-07-29 NOTE — PROGRESS NOTES
Jaleel Archer  : 1960  Payor: EBER [facility], [address] [place] [address] / Plan: [place] EBER [place]. / Product Type: Commerical /  [address]  at [facility] DULCE ANGUIANO  [address] [facility], [address], [place] [address].  [phone]   [phone]       OUTPATIENT PHYSICAL THERAPY: Daily Treatment Note 2020  Visit Count: 42     ICD-10: Treatment Diagnosis: Pain in left shoulder (M25.512); Presence of left artificial shoulder joint (D93.423)  Precautions/Allergies: Post op precautions. From EMR: Codeine   TREATMENT PLAN:  Effective Dates: 2020 TO 2020 (30 days). Frequency/Duration: 2 times a week for 30 additional Days to next MD follow up. MEDICAL/REFERRING DIAGNOSIS:s/p L shoulder I&D, removal of [place] implant, revision reverse TSA, biceps tendonesis, lat dorsi and teres major tendon transfer  L shoulder   DATE OF ONSET: 20 surgery  REFERRING PHYSICIAN: Elijah Mcguire MD MD Orders: Eval and Treat; HEP; ROM; Strength (20)  Return MD Appointment: 8/3/20       Jaleel Archer was cleared by the screener to enter the clinic this morning. Pre-treatment Symptoms/Complaints: Says her shoulder is a little sore today. Did some yard work after her exercises on Monday. Shoulder was fatigued. Did not do too much yesterday. Pain: Initial:   No VAS. Post Session: no complaints of pain   Medications Last Reviewed: 20    Updated Objective Findings:   No new measure. TREATMENT:     Active Warm up on UBE x10' with alternating double arm and single arm each minute. Therapeutic Exercise: (40 Minutes): Exercises for shoulder and shoulder girdle strength and stabilization as per grid. Exercises modified as indicated. Minimal cues for exercises technique. HEP:  No new HEP.    Date  20 Date  20 Date  20 Date  20 Date  7/15/20 Date  20 Date  20 Date  20 Date  20   Activity/Exercise            UBE L. 3.5  x10' L. 3.5 x10' L. 3.5 x10' L. 3.5 x10' L. 3x5 x10' L. 3x5 x10' L. 3 x10' L. 3 x10' L. 3 x10'   Serratus press - Horiz add  3# cable  2x15 - - - Horiz add+press combo  7# cable   3x10 ea - Incline press 45-deg  5# 3x10 -   Shoulder Ext -- - - Standing 10# cable  3x10 - Standing 10# cable  3x10 - Bent over, bilat  5# 3x10;  Lat pull down, underhand  27# 3x10 -   Middle trapezius - horiz abd  7# cable   2x15 - horiz abd  7# cable 3x10 - horiz abd+mid trap step out combo  7# cable 3x10 ea Bent over, elbows flexed 90  2# 3x10 - -   Lower trapezius - Standing billat \"Y\"  Green 2x10 AAROM concentrics - Standing billat \"Y\"  Green 3x12 - Standing unilat \"Y\"  3# cable   3x10 AAROM concentric - bilat \"Y\" at wall, blue  3x10 -   Scapular retractions - - Bent row  20# bar  3x10 - - - Bent row  20# bar  3x15 Landmine row  25# x15  30# x15  32. 5# x15   -   Shoulder ER - - - - - - - Static hold  Red 10\" x10 -   Forward Elevation Overhead press  20# bar   2x10-13; Forward raise to 90-deg  3# 2x15; Forward reach wrist roll  2.5# 1x6 - Overhead press  20# bar   3x10; Forward raise to 90-deg  3# 3x10; Forward reach wrist roll  2.5# 2x6 - Overhead press  20# bar   3x10;  Scaption+ER/abd stab green 3x10,  Scaption+IR/add stab  Green 3x10  - Overhead press  20# bar   3x10;   Forward raise to 90-deg  4# 3x10 Forward raise to 90-deg  5# 3x10;  Landmine press+horiz abd/add 12.5# 2x10 -   Abduction Straight arm fly  3# 2x15;  Bent arm fly  2# 2x15;  Upright row  20# bar  2x15 - Upright row  20# bar   3x10;  Straight arm fly  3# 3x10;  Bent arm fly  2# 2x12 - Upright row  20# bar   3x10; - Upright row  20# bar   3x15;  Straight arm lateral fly  4# 3x10;  unilat upright row  Yellow 2x10 Straight arm lateral fly  5# 3x10 -   Diagonal Flexion - D1:  3# cable  2x10;  D2:  3# cable  2x10 - D1:  3# cable  2x10;  D2:  3# cable  2x10 - - D1   3# cable   2x10 - D1  3# cable   2x10  D2  3# cable  2x10   Diagonal Extension - D1:  7# cable  2x15 ea;  D2:  7# cable  2x15 ea - - D1:  10# cable  3x10 ea;  D2:  10# cable  3x10 ea - -  - D1:  10# cable  2x10 ea;  D2:  10# cable  2x10 (AArOM)    Internal rotation Ball squeeze  5-6\" 2x10 -   Ball squeeze  5-6\" 2x12 - - Ball squeeze+reach    2x 5+5 with perterbation - - Ball squeeze+reach down/out/up  2x10   Rhythmic Stabilization Blue flex bar, 5 ways   1x5 ea; Ball Drops, 3-ways standing,   500-g ball  1x30 ea    Blue flex bar, 5 ways   1x5 ea; Ball Drops, 3-ways standing 90-deg,   500-g ball  1x30 ea,   Bent elbow  Rubber ball 3x20 abd and flex position Blue flex bar AROM, 5 ways   1x5 ea, static 90 deg flex, ER by side, ER 90 deg abd 2x30\" ea  Blue flex bar AROM, 5 ways   1x5 ea, static 90 deg flex, ER by side, ER 90 deg abd 2x30\" ea Blue flex bar AROM, 5 ways   1x5 ea, static 90 deg flex, ER by side, ER 90 deg abd 2x30\" ea Blue flex bar AROM, 5 ways   1x5 ea, static 90 deg flex, ER by side, ER 90 deg abd 2x30\" ea Blue flex bar AROM scaption +manual rhyth stab  4x5;  Quadruped manual alt iso and rhyth stab 3x20-30\" Blue flex bar AROM 5 ways  3x5 ea;  static 90 deg flex, abd, ER 90 deg abd 2x30\" ea Blue flex bar AROM 5 ways  3x5 ea; Ball drops 500-g ball 90-deg flex, abd 3x20 ea   Closed kinetic chain--UE - - Tall plank to downward dog 2x10;  Shoulder tap from knee 2x10 ea; Stability ball UE walk-out, fwd/back 2x4 steps     - - Tall plank UE lift  2x5 ea - - Tall plank+row combo  3x3 ea   Horiz abd/add - H Abd   7# cable  2x15 ea;  H Add  3# cable  3x10 ea - - - - - - H. Abd  7# cable 2x10 ea;  H. Add  7# cable 2x10 ea   Dead Lift - 40# 2x15 - - 40# bar   3x10 - swing squat, double hand \"Kettle bell\" 8# 2x15 - swing squat, double hand \"Kettle bell\" 8# 2x15,  Single hand 5# 3x15 ea    Biceps Curl 20# bar  2x15 - 20# bar   3x10 - 20# bar   3x10 - 20# bar   3x15 - -   Triceps       Kick back  4# 3x15 - -   Reactive Control/Coordination Small ball toss x60+ - - - - Small ball toss x80;   Ball dribble - - -     Treatment/Session Summary:    · Response to Treatment: Very good performance of the exercises, but was a little more fatigued looking with L shoulder moves. No significant pain/soreness with these exercises. · Communication/Consultation:  None today  · Equipment provided today:  None today  · Recommendations/Intent for next treatment session: She is out of town through next week. We will continue with controlled shoulder girdle re-strengthening on her return.      Total Treatment Billable Duration: 40 minutes  PT Patient Time In/Time Out  Time In: 0848  Time Out: [address] [facility], PT    Future Appointments   Date Time Provider Asuncion Mccormick   8/10/2020 10:00 AM Angela Solis, PT [facility]   8/12/2020  9:00 AM [facility]is[facility]

## 2020-08-10 ENCOUNTER — HOSPITAL ENCOUNTER (OUTPATIENT)
Dept: PHYSICAL THERAPY | Age: 60
Discharge: HOME | End: 2020-08-10
Payer: COMMERCIAL

## 2020-08-10 PROCEDURE — 97110 THERAPEUTIC EXERCISES: CPT

## 2020-08-10 NOTE — PROGRESS NOTES
Gui Guido  : 1960  Payor: EBER [employer], [address] [place] [address] / Plan: [place] [employer], [facility]. / Product Type: Commerical /  [address]  at [address] [place] [address] Rd  [address] [facility], [address], [place] [address].  [phone]   [phone]       OUTPATIENT PHYSICAL THERAPY: Daily Treatment Note 8/10/2020  Visit Count: 43     ICD-10: Treatment Diagnosis: Pain in left shoulder (M25.512); Presence of left artificial shoulder joint (U04.574)  Precautions/Allergies: Post op precautions. From EMR: Codeine   TREATMENT PLAN:  Effective Dates: 2020 TO 2020 (30 days). Frequency/Duration: 2 times a week for 30 additional days to next MD follow up. MEDICAL/REFERRING DIAGNOSIS:s/p L shoulder I&D, removal of [place] implant, revision reverse TSA, biceps tendonesis, lat dorsi and teres major tendon transfer  L shoulder   DATE OF ONSET: 20 surgery  REFERRING PHYSICIAN: Carlene Mcguire MD MD Orders: Eval and Treat; HEP; ROM; Strength (20)  Return MD Appointment: 20       Gui Guido was cleared by the screener to enter the clinic this morning. Pre-treatment Symptoms/Complaints: Says she is doing well. No pain to report. Using her L arm well through the day. Able to lift her grandchildren. Able to cut hair better now. Feels very good about her shoulder. She is to see Dr. Calvin Laboy tomorrow. Pain: Initial:   No VAS.   Post Session: no complaints of pain   Medications Last Reviewed: 8/10/20    Updated Objective Findings:   ROM:      LUE AROM  L Shoulder Flexion: 160(forward elevation)  L Shoulder Internal Rotation: (T6 behind back)  L Shoulder External Rotation: 70(by side, 60 in abd)  LUE PROM  L Shoulder Flexion: 165(forward elevation)  L Shoulder ABduction: 100(with scapula stabilized)  L Shoulder Internal Rotation: 70(stabilized at 45 and 80 deg abd)  L Shoulder External Rotation: 80(at 30 deg abd, 95 at 45 and 90 deg abd)                 Strength:   L Shoulder: Ext, Flex, Abd=5/5; ER, IR= 3-  L Elbow: Flex, Ext=5/5    OUTCOME MEASURE:   Tool Used: Disabilities of the Arm, Shoulder and Hand (DASH) Questionnaire - Quick Version  Score:  Initial: 45/55 or 77% disability 28/55 or 39% limited    (Date: 4/6/20 ) 26/55 or 34% disability (4/22/20) 26/55 or 34% disability (6/1/20)  20/55 or 20% disability (7/8/20) Most Recent: 14/55 or 7% disability (8/10/20)   Interpretation of Score: The DASH is designed to measure the activities of daily living in person's with upper extremity dysfunction or pain.  Each section is scored on a 1-5 scale, 5 representing the greatest disability.  The scores of each section are added together for a total score of 55.  This number is divided by 11, followed by subtracting 1 and multiplying by 25 to get a percent score of disability. This value represents the percentage disability: 0-20% minimal disability; 20-40% moderate disability; 40-60% severe disability; % dependent for care or exaggerated symptom behavior.  Minimal detectable change is 12%. TREATMENT:     Active Warm up on UBE x10' with alternating double arm and single arm each minute. Therapeutic Exercise: (30 Minutes): Exercises for shoulder and shoulder girdle strength and stabilization as per grid. Exercises modified as indicated. Minimal cues for exercises technique. HEP:  No new HEP.    Date  7/1/20 Date  7/6/20 Date  7/8/20 Date  7/13/20 Date  7/15/20 Date  7/20/20 Date  7/22/20 Date  7/27/20 Date  7/29/20 Date  8/10/20   Activity/Exercise             UBE L. 3.5  x10' L. 3.5 x10' L. 3.5 x10' L. 3.5 x10' L. 3x5 x10' L. 3x5 x10' L. 3 x10' L. 3 x10' L. 3 x10' L. 3 x10'   Serratus press - Horiz add  3# cable  2x15 - - - Horiz add+press combo  7# cable   3x10 ea - Incline press 45-deg  5# 3x10 - -   Shoulder Ext -- - - Standing 10# cable  3x10 - Standing 10# cable  3x10 - Bent over, bilat  5# 3x10;  Lat pull down, underhand  27# 3x10 - Standing 10# cable  3x10   Middle trapezius - horiz abd  7# cable   2x15 - horiz abd  7# cable 3x10 - horiz abd+mid trap step out combo  7# cable 3x10 ea Bent over, elbows flexed 90  2# 3x10 - - Bent over  4# 3x10   Lower trapezius - Standing billat \"Y\"  Green 2x10 AAROM concentrics - Standing billat \"Y\"  Green 3x12 - Standing unilat \"Y\"  3# cable   3x10 AAROM concentric - bilat \"Y\" at wall, blue  3x10 - Bent over  4# 3x10   Scapular retractions - - Bent row  20# bar  3x10 - - - Bent row  20# bar  3x15 Landmine row  25# x15  30# x15  32. 5# x15   - -   Shoulder ER - - - - - - - Static hold  Red 10\" x10 - -   Forward Elevation Overhead press  20# bar   2x10-13; Forward raise to 90-deg  3# 2x15; Forward reach wrist roll  2.5# 1x6 - Overhead press  20# bar   3x10; Forward raise to 90-deg  3# 3x10; Forward reach wrist roll  2.5# 2x6 - Overhead press  20# bar   3x10;  Scaption+ER/abd stab green 3x10,  Scaption+IR/add stab  Green 3x10  - Overhead press  20# bar   3x10;   Forward raise to 90-deg  4# 3x10 Forward raise to 90-deg  5# 3x10;  Landmine press+horiz abd/add 12.5# 2x10 - -   Abduction Straight arm fly  3# 2x15;  Bent arm fly  2# 2x15;  Upright row  20# bar  2x15 - Upright row  20# bar   3x10;  Straight arm fly  3# 3x10;  Bent arm fly  2# 2x12 - Upright row  20# bar   3x10; - Upright row  20# bar   3x15;  Straight arm lateral fly  4# 3x10;  unilat upright row  Yellow 2x10 Straight arm lateral fly  5# 3x10 - -   Diagonal Flexion - D1:  3# cable  2x10;  D2:  3# cable  2x10 - D1:  3# cable  2x10;  D2:  3# cable  2x10 - - D1   3# cable   2x10 - D1  3# cable   2x10  D2  3# cable  2x10 D1  3# cable   3x10  D2  3# cable  3x10   Diagonal Extension - D1:  7# cable  2x15 ea;  D2:  7# cable  2x15 ea - - D1:  10# cable  3x10 ea;  D2:  10# cable  3x10 ea - -  - D1:  10# cable  2x10 ea;  D2:  10# cable  2x10 (AArOM)  D1:  10# cable  3x10 ea  D2:  10# cable  3x10   Internal rotation Ball squeeze  5-6\" 2x10 -   Ball squeeze  5-6\" 2x12 - - Ball squeeze+reach 2x 5+5 with perterbation - - Ball squeeze+reach down/out/up  2x10 --   Rhythmic Stabilization Blue flex bar, 5 ways   1x5 ea; Ball Drops, 3-ways standing,   500-g ball  1x30 ea    Blue flex bar, 5 ways   1x5 ea; Ball Drops, 3-ways standing 90-deg,   500-g ball  1x30 ea,   Bent elbow  Rubber ball 3x20 abd and flex position Blue flex bar AROM, 5 ways   1x5 ea, static 90 deg flex, ER by side, ER 90 deg abd 2x30\" ea  Blue flex bar AROM, 5 ways   1x5 ea, static 90 deg flex, ER by side, ER 90 deg abd 2x30\" ea Blue flex bar AROM, 5 ways   1x5 ea, static 90 deg flex, ER by side, ER 90 deg abd 2x30\" ea Blue flex bar AROM, 5 ways   1x5 ea, static 90 deg flex, ER by side, ER 90 deg abd 2x30\" ea Blue flex bar AROM scaption +manual rhyth stab  4x5;  Quadruped manual alt iso and rhyth stab 3x20-30\" Blue flex bar AROM 5 ways  3x5 ea;  static 90 deg flex, abd, ER 90 deg abd 2x30\" ea Blue flex bar AROM 5 ways  3x5 ea; Ball drops 500-g ball 90-deg flex, abd 3x20 ea Blue flex bar AROM 5 ways  3x5 ea   Closed kinetic chain--UE - - Tall plank to downward dog 2x10;  Shoulder tap from knee 2x10 ea; Stability ball UE walk-out, fwd/back 2x4 steps     - - Tall plank UE lift  2x5 ea - - Tall plank+row combo  3x3 ea -   Horiz abd/add - H Abd   7# cable  2x15 ea;  H Add  3# cable  3x10 ea - - - - - - H. Abd  7# cable 2x10 ea;  H. Add  7# cable 2x10 ea -   Dead Lift - 40# 2x15 - - 40# bar   3x10 - swing squat, double hand \"Kettle bell\" 8# 2x15 - swing squat, double hand \"Kettle bell\" 8# 2x15,  Single hand 5# 3x15 ea  -   Biceps Curl 20# bar  2x15 - 20# bar   3x10 - 20# bar   3x10 - 20# bar   3x15 - - -   Triceps       Kick back  4# 3x15 - - -   Reactive Control/Coordination Small ball toss x60+ - - - - Small ball toss x80; Ball dribble - - - -     Treatment/Session Summary:    · Response to Treatment: She is now 6 months post op. Very good shoulder ROM and strength.  Weakness to humeral rotators as expected after this surgery, but these are improved as well. Excellent DASH score and reports much improved use of her L UE. Doing well with strength exercises. · Communication/Consultation:  None today  · Equipment provided today:  None today  · Recommendations/Intent for next treatment session: She is to follow up with Dr. Rosendo Honeycutt tomorrow. Will anticipate discharge to [facility] soon. Will plan to review final strength program next time.      Total Treatment Billable Duration: 30 minutes  PT Patient Time In/Time Out  Time In: 1000  Time Out: [address], PT    Future Appointments   Date Time Provider Asuncion Mccormick   8/12/2020  9:00 AM Bonita Lemus, PT [facility]

## 2020-08-10 NOTE — PROGRESS NOTES
Lyndsey Washington  : 1960  Primary: [place] [employer] [place]*  Secondary:  [address]  at [facility] DULCE ANGUIANO  [address] [facility], [address] [place] [address],[address], [facility] [address].  [phone]   [phone]       OUTPATIENT PHYSICAL THERAPY:MD Communication 8/10/2020     ICD-10: Treatment Diagnosis: Pain in left shoulder (M25.512); Presence of left artificial shoulder joint (P09.978)  Precautions/Allergies: Post op precautions. From EMR: Codeine   TREATMENT PLAN:  Effective Dates: 2020 TO 2020 (30 days). Frequency/Duration: 2 times a week for 30 additional Days to next MD follow up. MEDICAL/REFERRING DIAGNOSIS:s/p L shoulder I&D, removal of [place] implant, revision reverse TSA, biceps tendonesis, lat dorsi and teres major tendon transfer  L shoulder   DATE OF ONSET: 20 surgery  REFERRING PHYSICIAN: Everton Mcguire MD MD Orders: Eval and Treat; HEP; ROM; Strength; \"full motion/full strength\" (20)  Return MD Appointment: 20     PRGORESS ASSESSMENT:  Ms. Donna Hill has attended 37 visits to date. She is now 6 months s/p Incision, debridement, removal of implant, left shoulder \"[place]\" reverse left total shoulder arthroplasty with a proximal humeral osteotomy and revision reverse L total shoulder arthroplasty with a long stem Delta Xtend prosthesis, biceps tenodesis, latissimus dorsi and teres major tendon transfer. She continues to make excellent progress and has continued to work on strength progression in PT. She does not have significant pain complaints. She has good shoulder ROM both actively and passively. Strength continues to progress in functional use of her L UE as seen with her DASH score. Rotational control continues to be weak, but overall improved. She has met all her discharge goals and has a strength HEP she has been working on at home and in the gym.       Updated Objective Findings:   ROM:   LUE AROM  L Shoulder Flexion: 160(forward elevation)  L Shoulder Internal Rotation: (T6 behind back)  L Shoulder External Rotation: 70(by side, 60 in abd)  LUE PROM  L Shoulder Flexion: 165(forward elevation)  L Shoulder ABduction: 100(with scapula stabilized)  L Shoulder Internal Rotation: 70(stabilized at 45 and 80 deg abd)  L Shoulder External Rotation: 80(at 30 deg abd, 95 at 45 and 90 deg abd)                 Strength:   L Shoulder: Ext, Flex, Abd=5/5; ER, IR= 3-  L Elbow: Flex, Ext=5/5    OUTCOME MEASURE:   Tool Used: Disabilities of the Arm, Shoulder and Hand (DASH) Questionnaire - Quick Version  Score:  Initial: 45/55 or 77% disability 28/55 or 39% limited    (Date: 4/6/20 ) 26/55 or 34% disability (4/22/20) 26/55 or 34% disability (6/1/20)  20/55 or 20% disability (7/8/20) Most Recent: 14/55 or 7% disability (8/10/20)   Interpretation of Score: The DASH is designed to measure the activities of daily living in person's with upper extremity dysfunction or pain.  Each section is scored on a 1-5 scale, 5 representing the greatest disability.  The scores of each section are added together for a total score of 55.  This number is divided by 11, followed by subtracting 1 and multiplying by 25 to get a percent score of disability. This value represents the percentage disability: 0-20% minimal disability; 20-40% moderate disability; 40-60% severe disability; % dependent for care or exaggerated symptom behavior.  Minimal detectable change is 12%. RECOMMENDATION: She appears ready for discharge to her HEP.      Tamika Vasquez, PT, MSPT, OCS

## 2020-08-12 ENCOUNTER — APPOINTMENT (OUTPATIENT)
Dept: PHYSICAL THERAPY | Age: 60
End: 2020-08-12
Payer: COMMERCIAL

## 2020-08-14 NOTE — THERAPY DISCHARGE
Taya Miller  : 1960  Primary: [place] [employer]*  Secondary:  [address]  at [facility] DULCE ANGUIANO  [address] [place] [address],  Antoine Amador.  [phone]   [phone]       OUTPATIENT PHYSICAL THERAPY:Discharge Summary 2020     ICD-10: Treatment Diagnosis: Pain in left shoulder (M25.512); Presence of left artificial shoulder joint (P25.791)  Precautions/Allergies: Post op precautions. From EMR: Codeine MEDICAL/REFERRING DIAGNOSIS:s/p L shoulder I&D, removal of [place] implant, revision reverse TSA, biceps tendonesis, lat dorsi and teres major tendon transfer  L shoulder   DATE OF ONSET: 20 surgery  REFERRING PHYSICIAN: Ari Mcguire MD MD Orders: Eval and Treat; HEP; ROM; Strength; \"full motion/full strength\" (20)  Return MD Appointment: 20     DISCHARGE ASSESSMENT:  Ms. Sharon Loza attended 37 visits for the post op revision reverse L TSA rehab. She made excellent progress. She does not have significant pain complaints. She has good shoulder ROM both actively and passively. Strength continues to progress in functional use of her L UE as seen with her DASH score. Rotational control continues to be weak, but overall improved. She has met all her discharge goals and has a strength HEP she has been working on at home and in the gym.       Updated Objective Findings:   ROM:   LUE AROM  L Shoulder Flexion: 160(forward elevation)  L Shoulder Internal Rotation: (T6 behind back)  L Shoulder External Rotation: 70(by side, 60 in abd)  LUE PROM  L Shoulder Flexion: 165(forward elevation)  L Shoulder ABduction: 100(with scapula stabilized)  L Shoulder Internal Rotation: 70(stabilized at 45 and 80 deg abd)  L Shoulder External Rotation: 80(at 30 deg abd, 95 at 45 and 90 deg abd)                 Strength:   L Shoulder: Ext, Flex, Abd=5/5; ER, IR= 3-  L Elbow: Flex, Ext=5/5    OUTCOME MEASURE:   Tool Used: Disabilities of the Arm, Shoulder and Hand (DASH) Questionnaire - Quick Version  Score:  Initial: 45/55 or 77% disability 28/55 or 39% limited    (Date: 4/6/20 ) 26/55 or 34% disability (4/22/20) 26/55 or 34% disability (6/1/20)  20/55 or 20% disability (7/8/20) Most Recent: 14/55 or 7% disability (8/10/20)   Interpretation of Score: The DASH is designed to measure the activities of daily living in person's with upper extremity dysfunction or pain.  Each section is scored on a 1-5 scale, 5 representing the greatest disability.  The scores of each section are added together for a total score of 55.  This number is divided by 11, followed by subtracting 1 and multiplying by 25 to get a percent score of disability. This value represents the percentage disability: 0-20% minimal disability; 20-40% moderate disability; 40-60% severe disability; % dependent for care or exaggerated symptom behavior.  Minimal detectable change is 12%. RECOMMENDATION: I will plan to discharge An Mai from Physical Therapy at this time. She is to continue with her HEP as instructed. She is to follow up with Dr. Rosalina Rojas as ordered. I will be happy to follow up with her if there is a need for Physical Therapy in the future.     Thank you for the opportunity to be a part of her care    Alejandro Antonio, PT, MSPT, OCS

## 2021-01-07 ENCOUNTER — HOSPITAL ENCOUNTER (OUTPATIENT)
Dept: PHYSICAL THERAPY | Age: 61
Discharge: HOME | End: 2021-01-07
Payer: COMMERCIAL

## 2021-01-07 PROCEDURE — 97161 PT EVAL LOW COMPLEX 20 MIN: CPT

## 2021-01-07 NOTE — THERAPY EVALUATION
Suzette Thompson : 1960 Primary: Sc Planned Administrators, In* Secondary:  Therapy Center at [facility] DULCE ANGUIANO [address] [facility], [address] [place] [address],[address], [address]. [phone]   [phone] OUTPATIENT PHYSICAL THERAPY:Initial Assessment 2021 ICD-10: Treatment Diagnosis: Cervicalgia (M54.2) Precautions/Allergies: s/p reverse L TSA 20. From EMR: Codeine allergy TREATMENT PLAN: 
Effective Dates: 2021 TO 3/8/2021 (60 days). Frequency/Duration: 1-2 time a week for 60 Days MEDICAL/REFERRING DIAGNOSIS: L shoulder, neck DATE OF ONSET: 2020 REFERRING PHYSICIAN: Panda Diaz MD MD Orders: Eval and Treat; HEP; Strengthening; ROM; Traction; \"Dry Needling, Deep massage, all modalities\" (2021) Return MD Appointment: 21 INITIAL ASSESSMENT:  Ms. Abigail Romo is a known patient in our clinic. She is nearly 1 year s/p revision reverse total shoulder arthroplasty. She presents today with the complaint of central lower cervical pain beginning of insidious onset about 2 months ago. This pain is intermittent and worse with computer work, lying down and getting up from bed. She has no pain now. There is tenderness and intervertebral stiffness to C7/T1 spinous process, which appear to be a little rotated to R. Cervical and thoracic AROM appears WNL's. Shoulder ROM is grossly WNL's on R and with mild limitation after Reverse TSA on L. She has good strength to the UE's. This appears to be a positional strain to the cervicothoracic spine. Recent change in computer desk set up may have contributed. She will benefit from PT to address the joint and muscle dysfunctions contributing to the pain here, and instruct in a neck/back care program.   
PROBLEM LIST (Impacting functional limitations): 1. Pain Cervico-Thoracic spine INTERVENTIONS PLANNED: (Treatment may consist of any combination of the following) 1.  Manual Therapies, Functional Dry Needling, thermal and electric modalities as needed for the pain and segmental joint dysfunction. 2. Therapeutic Activity for neck and back care body mechanics. 3. Therapeutic Exercise for Upper quarter pain and neck/back program  
 
GOALS: (Goals have been discussed and agreed upon with patient.) Discharge Goals: Time Frame: 2 months 1. Report no more than 3/10 intermittent pain to cervicothoracic spine with daily activities, sit to/from supine. 2. Independent and self corrects with neck/back care body mechanics and alignment set up with desk/computer and driving. 3. Score 10% or less on the NDI and 15% or less on the quick DASH for improved comfort with normal daily activities and sleep. OUTCOME MEASURE:  
Tool Used: Disabilities of the Arm, Shoulder and Hand (DASH) Questionnaire - Quick Version Score:  Initial: 19/55 or 18% disability Most Recent: X/55 (Date: -- ) Interpretation of Score: The DASH is designed to measure the activities of daily living in person's with upper extremity dysfunction or pain. Each section is scored on a 1-5 scale, 5 representing the greatest disability. The scores of each section are added together for a total score of 55. Tool Used: Neck Disability Index (NDI) Score:  Initial: 8/50 or 16% disability Most Recent: X/50 (Date: -- ) Interpretation of Score: The Neck Disability Index is a revised form of the Oswestry Low Back Pain Index and is designed to measure the activities of daily living in person's with neck pain. Each section is scored on a 0-5 scale, 5 representing the greatest disability. The scores of each section are added together for a total score of 50. MEDICAL NECESSITY:  
· Cervciothoracic pain and joint segmental dysfunciton limiting comroft with her normal daily activities . REASON FOR SERVICES/OTHER COMMENTS: 
· Segmental dysfunction to C-T spine contributing to he pain problem.   
· History of L revision Reverse TSA last year may be contributing to stress and strain to this area. Total Duration: PT Patient Time In/Time Out Time In: 1000 Time Out: 1100 Rehabilitation Potential For Stated Goals: Good to Excellent Regarding Kendra Francis's therapy, I certify that the treatment plan above will be carried out by a therapist or under their direction. Thank you for this referral, 
 
Nirmal Boudreaux, PT, MSPT, OCS Referring Physician Signature: Bin Hale., MD No Signature is Required for this note. PAIN/SUBJECTIVE:  
Initial:   0/10 now, 5/10 worst central to L lower cervical spine Post Session: No increase. HISTORY:  
History of Injury/Illness (Reason for Referral): Incidous onset of central to L lower cervical pain beginning about 2 months ago. Pain is currently intermittent. It is primarily centrally to lower cervical/upper thoracic spine, but will radiate to medial boarder of L scapula. Pain mostly when lying done, first thing in the morning and later in the day. It does not disrupt sleep. She has been able to continue to do gym exercises 2 days per week without problem. Recent x-ray and MR imaging show lower cervical DDD. Has been treating with ice, advil, then changed to gabapentin. Has used chiropractic in past, but has been able to see her chiropractor yet. L shoulder has been doing very well. Past Medical History/Comorbidities: s/p L Revision Reverse TSA 2/6/2020. From EMR:  
Ms. Lara Bloch  has a past medical history of Allergic rhinitis (3/7/2013), Chronic sinusitis, Deviated nasal septum, Eustachian tube dysfunction, Fatigue (3/7/2013), High frequency hearing loss, Hypertrophy of nasal turbinates, Migraine headache, Otalgia, SNHL (sensorineural hearing loss), Tinnitus, and TMJ (temporomandibular joint disorder). Ms. Lara Bloch  has a past surgical history that includes hx gyn (2009) and hx orthopaedic (Left, 2018). Social History/Living Environment: Lives with her .  
Prior Level of Function/Work/Activity: Independent with all activities and use of her L/non-dominant UE. 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 Active with gym exercises 2 days per week for strength and conditioning. Dominant Side: RIGHT Ambulatory/Rehab Services H2 Model Falls Risk Assessment Risk Factors: 
     No Risk Factors Identified Ability to Rise from Chair: 
     (0)  Ability to rise in a single movement Falls Prevention Plan: No modifications necessary Total: (5 or greater = High Risk): 0  
©2010 [facility] of [employer] States Patent #[id number]. Federal Law prohibits the replication, distribution or use without written permission from [facility] Current Medications: Gabapentin Date Last Reviewed:  1/7/2021 Number of Personal Factors/Comorbidities that affect the Plan of Care: 1-2: MODERATE COMPLEXITY EXAMINATION:  
 
Observation/Orthostatic Postural Assessment: Comes into clinic without distress. Surgical scar to anterior L shoulder noted. Alignment in standing: mild forward head, mild increase in cervicothoracic junction flexion, and mild flattened upper thoracic spine. Palpation: Tender to central and L C7, T1 spinous processes. T1 feels to be more prominent and possible in R rotation dysfunction. ROM: 
Cervical and thoracic AROM grossly WNLs'. PROM to OA, AA, mid and lower cervical physiologic motions are WNL's. Shoulder AROM to forward elevation is mildly limited on L compared to R. Special Tests: Passive accessory motions to upper thoracic spine midly stiff. Neurological Screen: Upper quarter neuro screen intact to light touch sensation and motor. Upper Limb tension tests are negative to median and radial nerves. Functional Mobility: Independent with all basic mobility. Reports no limitation with cervical motions with driving. Body Structures Involved: 1. Joints 2. Muscles Body Functions Affected: 1. Neuromusculoskeletal 
2. Movement Related Activities and Participation Affected: 1. General Tasks and Demands 2. Comfort with daily activities Number of elements (examined above) that affect the Plan of Care: 4+: HIGH COMPLEXITY CLINICAL PRESENTATION:  
Presentation: Stable and uncomplicated: LOW COMPLEXITY CLINICAL DECISION MAKING:  
Use of outcome tool(s) and clinical judgement create a POC that gives a: Clear prediction of patient's progress: LOW COMPLEXITY

## 2021-01-07 NOTE — PROGRESS NOTES
Ange Hemp  : 1960  Payor: EBER [facility], [address] [place] [address] / Plan: [place] PLANNED Layla Toribiot. / Product Type: Commerical /  [address] [place]  at [facility] DULCE ANGUIANO  [address] [facility], [address], [place] [address].  [phone]   [phone]       OUTPATIENT PHYSICAL THERAPY: Daily Treatment Note 2021  Visit Count: 1     ICD-10: Treatment Diagnosis: Cervicalgia (M54.2)  Precautions/Allergies: s/p reverse L TSA 20. From EMR: Codeine allergy  TREATMENT PLAN:  Effective Dates: 2021 TO 3/8/2021 (60 days). Frequency/Duration: 1-2 time a week for 60 Days MEDICAL/REFERRING DIAGNOSIS: L shoulder, neck   DATE OF ONSET: 2020  REFERRING PHYSICIAN: Mary Ann Parisi MD MD Orders: Eval and Treat; HEP; Strengthening; ROM; Traction; \"Dry Needling, Deep massage, all modalities\" (2021)  Return MD Appointment: 21       Pre-treatment Symptoms/Complaints:  See evaluation. Pain: Initial:   0/10 now, 5/10 worst CT junction, L med scapula Post Session:  0/10   Medications Last Reviewed:  2021    Updated Objective Findings:   See evaluation note from today     TREATMENT:     Manual Therapies: (5 Minutes): Mobilization to C7/T1 for stiffness and R rotation correction with lower cervical side-glides, R transverse glide combo in prone, mid thoracic screwdriver and pistol manipulations. Therapeutic Exercise: (10 Minutes): Instruction and performance in exercises for spine mobility with quadruped respiratory belly lift, sitting active cervical retraction and cervical extension. Good return demonstration with training. Kinesio tape applied for lower cervical strain technique with mechanical correction at C7/T1 level. HEP: Brief education in desk and bed positioning for cervical spine. Verbal instruction for the exercises done today. She is to remove the tape by Rikki morning. She can continue to use ice or heat as needed. She verbalizes understanding.   [place] Portal    Treatment/Session Summary:    · Response to Treatment: 2 month history of central to L cervicothoracic pain. Appears to have mild strain here with possible R rotation dysfunction. Good start to the exercises. No significant pain reported post treatment.   · Communication/Consultation:  None today  · Equipment provided today:  None today  · Recommendations/Intent for next treatment session: Continue with manual treatments to lower cervical spine. May trial Functional Dry Needling for muscle dysfunction here.     Total Treatment Billable Duration:  15 minutes  PT Patient Time In/Time Out  Time In: 1000  Time Out: 1100    Fernando Mora, DAMEON    Future Appointments   Date Time Provider Department Center   1/13/2021  9:45 AM Fernando Mora, PT [facility]   1/20/2021 10:30 AM Fernando Mora, PT [facility]   1/27/2021  9:45 AM Fernando Mora, PT [facility]   2/3/2021  9:45 AM Fernando Mora, PT [facility]   2/10/2021  9:45 AM Fernando Mora, PT [facility]   2/17/2021  9:45 AM Fernando Mora, PT [facility]

## 2021-01-13 ENCOUNTER — HOSPITAL ENCOUNTER (OUTPATIENT)
Dept: PHYSICAL THERAPY | Age: 61
Discharge: HOME | End: 2021-01-13
Payer: COMMERCIAL

## 2021-01-13 PROCEDURE — 97035 APP MDLTY 1+ULTRASOUND EA 15: CPT

## 2021-01-13 PROCEDURE — 20560 NDL INSJ W/O NJX 1 OR 2 MUSC: CPT

## 2021-01-13 PROCEDURE — 97110 THERAPEUTIC EXERCISES: CPT

## 2021-01-13 NOTE — PROGRESS NOTES
Ange Shermanp  : 1960  Payor: EBER [facility], [address] [place] [address] / Plan: [place] PLANNED Layla Catarinot. / Product Type: Commerical /  [address] [place]  at [facility] DULCE ANGUIANO  [address] [facility], [address], [place] [address].  [phone]   [phone]       OUTPATIENT PHYSICAL THERAPY: Daily Treatment Note 2021  Visit Count: 2     ICD-10: Treatment Diagnosis: Cervicalgia (M54.2)  Precautions/Allergies: s/p reverse L TSA 20. From EMR: Codeine allergy  TREATMENT PLAN:  Effective Dates: 2021 TO 3/8/2021 (60 days). Frequency/Duration: 1-2 time a week for 60 Days MEDICAL/REFERRING DIAGNOSIS: L shoulder, neck   DATE OF ONSET: 2020  REFERRING PHYSICIAN: Mary Ann Parisi MD MD Orders: Eval and Treat; HEP; Strengthening; ROM; Traction; \"Dry Needling, Deep massage, all modalities\" (2021)  Return MD Appointment: 21       Pre-treatment Symptoms/Complaints:  Says she has been sore after going to her chiropractor on Monday. He did some soft tissue work and adjustments. Says the taping was helpful. Took it off on . Has been working on her HEP. Pain: Initial:    Post Session:  0/10   Medications Last Reviewed:      Updated Objective Findings:   No new measure. TREATMENT:     Therapeutic Modalities: (10 Minutes): Ultrasound for thermal effects to soft tissue at C7/T1 level--1 MHz, 1.5 W/cm2 x10' while in prone. No adverse reaction. Functional Dry Needling: (10 Minutes): After benefits and risks discussed and informed consent signed, Functional Dry Needling treatment to C7/T1 multifidus and L paraspinals to T3 for muscle dysfunction. Muscle twitch noted. No adverse reaction. Dry Needles Used: 3   Dry Needles Removed: 3   Therapeutic Exercise: (20 Minutes): Reviewed and performed in exercises for spine mobility with quadruped respiratory belly lift, and quick review of active cervical retraction and cervical extension. Good understanding of these.  Instruction and performance of quadruped rock back; supine thoracic extension stretch over 1/2 foam roll with extension, ext+shoulder flx x3 each, and ext/side flexion x5 at 2 levels. Good return demonstration with practice. Kinesio tape applied for lower cervical strain technique with mechanical correction at C7/T1 level. HEP: Verbal instruction for the new exercises done today. She is to continue with existing and add the new ones to her HEP. She is to remove the tape in 3 days. She can use heat as needed at needling sites. She verbalizes understanding. [employer] Portal    Treatment/Session Summary:    · Response to Treatment: Dry needling treatment used today for muscle dysfunction. No immediate adverse effect with the treatment or at end. · Communication/Consultation:  None today  · Equipment provided today:  None today  · Recommendations/Intent for next treatment session: Continue with treatments to lower cervical spine pain, soft tissue and muscle dysfunction. Continue taping and assess effects of the dry Needling and proceed as appropriate.      Total Treatment Billable Duration: 40 minutes  PT Patient Time In/Time Out  Time In: 9672  Time Out: 575 [facility],     Future Appointments   Date Time Provider Asuncion Mccormick   1/20/2021  1:00 PM Khang Gonzalez, PT [facility]   1/27/2021  9:45 AM William Anders, PT [facility]   2/3/2021  9:45 AM William Anders, DAMEON GOMEZ [facility]   2/10/2021  9:45 AM William Anders, PT [facility]   2/17/2021  9:45 AM William Anders, PT [facility]

## 2021-01-20 ENCOUNTER — HOSPITAL ENCOUNTER (OUTPATIENT)
Dept: PHYSICAL THERAPY | Age: 61
Discharge: HOME | End: 2021-01-20
Payer: COMMERCIAL

## 2021-01-20 PROCEDURE — 97140 MANUAL THERAPY 1/> REGIONS: CPT

## 2021-01-20 PROCEDURE — 20560 NDL INSJ W/O NJX 1 OR 2 MUSC: CPT

## 2021-01-20 NOTE — PROGRESS NOTES
Carolyn Moya  : 1960  Payor: EBER [facility], [address] [place] [id number] / Plan: [place] EBER [place]. / Product Type: Commerical /  [address] [place]  at [facility] DULCE ANGUIANO  [address] [facility], [address], [address] [facility]  [phone]   [phone]       OUTPATIENT PHYSICAL THERAPY: Daily Treatment Note 2021  Visit Count: 3     ICD-10: Treatment Diagnosis: Cervicalgia (M54.2)  Precautions/Allergies: s/p reverse L TSA 20. From EMR: Codeine allergy  TREATMENT PLAN:  Effective Dates: 2021 TO 3/8/2021 (60 days). Frequency/Duration: 1-2 time a week for 60 Days MEDICAL/REFERRING DIAGNOSIS: L shoulder, neck   DATE OF ONSET: 2020  REFERRING PHYSICIAN: Raj Jara MD MD Orders: Eval and Treat; HEP; Strengthening; ROM; Traction; \"Dry Needling, Deep massage, all modalities\" (2021)  Return MD Appointment: 21       Pre-treatment Symptoms/Complaints:  Says she has been sore after going to her chiropractor on Monday. He did some soft tissue work and adjustments. Says the taping was helpful. Took it off on . Has been working on her HEP. Pain: Initial:    Post Session:  0/10   Medications Last Reviewed:      Updated Objective Findings:   No new measure. TREATMENT:     Therapeutic Modalities: (10 Minutes): Ultrasound for thermal effects to soft tissue at C7/T1 level--1 MHz, 1.5 W/cm2 x10' while in prone. No adverse reaction. Functional Dry Needling: (10 Minutes): After benefits and risks discussed and informed consent signed, Functional Dry Needling treatment to C7/T1 multifidus and L paraspinals to T3 for muscle dysfunction. Muscle twitch noted. No adverse reaction. Dry Needles Used: 3   Dry Needles Removed: 3   Therapeutic Exercise: (20 Minutes): Reviewed and performed in exercises for spine mobility with quadruped respiratory belly lift, and quick review of active cervical retraction and cervical extension. Good understanding of these.  Instruction and performance of quadruped rock back; supine thoracic extension stretch over 1/2 foam roll with extension, ext+shoulder flx x3 each, and ext/side flexion x5 at 2 levels. Good return demonstration with practice. Kinesio tape applied for lower cervical strain technique with mechanical correction at C7/T1 level. HEP: Verbal instruction for the new exercises done today. She is to continue with existing and add the new ones to her HEP. She is to remove the tape in 3 days. She can use heat as needed at needling sites. She verbalizes understanding. [employer] Portal    Treatment/Session Summary:    · Response to Treatment: Dry needling treatment used today for muscle dysfunction. No immediate adverse effect with the treatment or at end. · Communication/Consultation:  None today  · Equipment provided today:  None today  · Recommendations/Intent for next treatment session: Continue with treatments to lower cervical spine pain, soft tissue and muscle dysfunction. Continue taping and assess effects of the dry Needling and proceed as appropriate.      Total Treatment Billable Duration: 40 minutes       Jacqui Massey, PT    Future Appointments   Date Time Provider Asuncion Mccormick   1/27/2021  9:45 AM Cecelia Greco, PT [facility]   2/3/2021  9:45 AM Berlin Dela Cruz, PT [facility]   2/10/2021  9:45 AM Berlin Dela Cruz PT [facility]   2/17/2021  9:45 AM Berlin Dela Cruz PT [facility]

## 2021-01-27 ENCOUNTER — HOSPITAL ENCOUNTER (OUTPATIENT)
Dept: PHYSICAL THERAPY | Age: 61
Discharge: HOME | End: 2021-01-27
Payer: COMMERCIAL

## 2021-01-27 PROCEDURE — 97035 APP MDLTY 1+ULTRASOUND EA 15: CPT

## 2021-01-27 PROCEDURE — 20560 NDL INSJ W/O NJX 1 OR 2 MUSC: CPT

## 2021-01-27 PROCEDURE — 97140 MANUAL THERAPY 1/> REGIONS: CPT

## 2021-01-27 NOTE — PROGRESS NOTES
Jimmy Wagner  : 1960  Payor: EBER [facility], [address] [place] [address] / Plan: [place] E[place]. / Product Type: Commerical /  [address] [place]  at [facility] DULCE ANGUIANO  [address] [facility], [address], [facility] [place] [address].  [phone]   [phone]       OUTPATIENT PHYSICAL THERAPY: Daily Treatment Note 2021  Visit Count: 4     ICD-10: Treatment Diagnosis: Cervicalgia (M54.2)  Precautions/Allergies: s/p reverse L TSA 20. From EMR: Codeine allergy  TREATMENT PLAN:  Effective Dates: 2021 TO 3/8/2021 (60 days). Frequency/Duration: 1-2 time a week for 60 Days MEDICAL/REFERRING DIAGNOSIS: L shoulder, neck   DATE OF ONSET: 2020  REFERRING PHYSICIAN: Mehdi James MD MD Orders: Eval and Treat; HEP; Strengthening; ROM; Traction; \"Dry Needling, Deep massage, all modalities\" (2021)  Return MD Appointment: 21       Pre-treatment Symptoms/Complaints:  Says she has been doing better. Sore that day after last time with the dry needling, but put heat on it and better next day. Went back to the gym this week. Light workout, so not too bad. Has been doing her HEP as instructed. Moved wrong this morning so a little more sore to the lower neck. Pain: Initial:    Post Session:  0/10   Medications Last Reviewed:  2021    Updated Objective Findings:   No new measure. TREATMENT:     Therapeutic Modalities: (10 Minutes): Ultrasound for thermal effects to soft tissue at C5-T1 level--1 MHz, 1.5 W/cm2 x10' while in prone. No adverse reaction. Functional Dry Needling: (5 Minutes): After benefits and risks discussed and informed consent signed, Functional Dry Needling treatment to C7/T1 multifidus and L paraspinals to T3 for muscle dysfunction. Muscle twitch noted. No adverse reaction.    Dry Needles Used: 2   Dry Needles Removed: 2   Manual Therapy: (20 Minutes): Joint mobilizations for stiffness and pain to C5-T2 with central PA glides, grade 3- to 3 to each; unilat PA glide at C7 and T1   Kinesio tape applied for lower cervical strain technique with mechanical correction at C7/T1 level. Instruction in tape wear and removal if skin is irritated. She verbalizes understanding. HEP: She is to continue with existing HEP. She is to remove the tape in 3 days. She can use heat as needed at needling sites. She verbalizes understanding. [employer]    Treatment/Session Summary:    · Response to Treatment: Reporting improved symptoms overall. Segment dysfunction noted to lower cervical spine. Dry needling treatment again today for muscle dysfunction. No immediate adverse effect with the treatment or at end. She is independent with her HEP to date, and has returned to light gym exercises. · Communication/Consultation:  None today  · Equipment provided today:  None today  · Recommendations/Intent for next treatment session: Continue with treatments to lower cervical spine pain, soft tissue and muscle dysfunction.      Total Treatment Billable Duration:  35 minutes  PT Patient Time In/Time Out  Time In: 6496  Time Out: 129 Pamela Cabrera, DAMEON    Future Appointments   Date Time Provider Asuncion Mccormick   2/3/2021  9:45 AM Linnea Subramanian, PT [facility]   2/10/2021  9:45 AM Alex Galo PT [facility]   2/17/2021  9:45 AM Alex Galo PT [facility]

## 2021-02-03 ENCOUNTER — HOSPITAL ENCOUNTER (OUTPATIENT)
Dept: PHYSICAL THERAPY | Age: 61
Discharge: HOME | End: 2021-02-03
Payer: COMMERCIAL

## 2021-02-03 PROCEDURE — 20560 NDL INSJ W/O NJX 1 OR 2 MUSC: CPT

## 2021-02-03 PROCEDURE — 97035 APP MDLTY 1+ULTRASOUND EA 15: CPT

## 2021-02-03 PROCEDURE — 97012 MECHANICAL TRACTION THERAPY: CPT

## 2021-02-03 NOTE — PROGRESS NOTES
Zoe Schrader  : 1960  Payor: [facility], [address] [place] [address] / Plan[place] PL[place] Neisha Martínezantonio. / Product Type: Commerical /  [address]  at [facility] DULCE ANGUIANO  [address] [facility], [address], [facility]. .  [phone]   [phone]       OUTPATIENT PHYSICAL THERAPY: Daily Treatment Note 2/3/2021  Visit Count: 5     ICD-10: Treatment Diagnosis: Cervicalgia (M54.2)  Precautions/Allergies: s/p reverse L TSA 20. From EMR: Codeine allergy  TREATMENT PLAN:  Effective Dates: 2021 TO 3/8/2021 (60 days). Frequency/Duration: 1-2 time a week for 60 Days MEDICAL/REFERRING DIAGNOSIS: L shoulder, neck   DATE OF ONSET: 2020  REFERRING PHYSICIAN: Elmira Trotter MD MD Orders: Eval and Treat; HEP; Strengthening; ROM; Traction; \"Dry Needling, Deep massage, all modalities\" (2021)  Return MD Appointment: 21       Pre-treatment Symptoms/Complaints:  Says her neck is doing better. Still with pain to the lower cervical spine, especially at night. Has not been doing the gym exercise to allow rest. Has been having chiro treatment 1-2 days per week. Has been doing her HEP. Pain: Initial:   No pain now. Post Session:  0/10   Medications Last Reviewed:  2/3/2021    Updated Objective Findings:   No new measure. TREATMENT:     Functional Dry Needling: (10 Minutes): After benefits and risks discussed and informed consent signed, Functional Dry Needling treatment to C7/T1 multifidus and L paraspinals to T3 for muscle dysfunction. Muscle twitch noted. No adverse reaction. Dry Needles Used: 2   Dry Needles Removed: 2   Therapeutic Modalities: (10 Minutes): Ultrasound for thermal effects to soft tissue at C5-T1 level--1 MHz, 1.5 W/cm2 x10' while in prone. No adverse reaction. Mechanical Traction: (15 Minutes total time): Initiated intermittent cervical traction at 16 lbs progressing to 20 lbs on/10 lbs off x10' total; traction angle set to affect cervicothoracic spine.  No adverse reaction during or immediately post treatment. HEP: She is to continue with existing HEP. She verbalizes understanding. [email or web address] Portal    Treatment/Session Summary:    · Response to Treatment: Reporting improved symptoms overall, but still pain pain to C-T junction. Intermittent cervical traction initiated. · Communication/Consultation:  None today  · Equipment provided today:  None today  · Recommendations/Intent for next treatment session: Continue with treatments to lower cervical spine pain, soft tissue and muscle dysfunction.      Total Treatment Billable Duration: 35 minutes  PT Patient Time In/Time Out  Time In: 7097  Time Out: 90 [facility], PT    Future Appointments   Date Time Provider Asuncion Mccormick   2/10/2021  9:45 AM Elizabeth Feng, PT [facility]   2/17/2021  9:45 AM [facility], [facility]

## 2021-02-10 ENCOUNTER — HOSPITAL ENCOUNTER (OUTPATIENT)
Dept: PHYSICAL THERAPY | Age: 61
Discharge: HOME | End: 2021-02-10
Payer: COMMERCIAL

## 2021-02-10 PROCEDURE — 97140 MANUAL THERAPY 1/> REGIONS: CPT

## 2021-02-17 ENCOUNTER — HOSPITAL ENCOUNTER (OUTPATIENT)
Dept: PHYSICAL THERAPY | Age: 61
Discharge: HOME | End: 2021-02-17
Payer: COMMERCIAL

## 2021-02-17 PROCEDURE — 97012 MECHANICAL TRACTION THERAPY: CPT

## 2021-02-17 PROCEDURE — 97110 THERAPEUTIC EXERCISES: CPT

## 2021-02-17 PROCEDURE — 97140 MANUAL THERAPY 1/> REGIONS: CPT

## 2021-02-17 NOTE — PROGRESS NOTES
Meme Napier  : 1960  Payor: PLANNED [facility], [address] [place] [address] / [place] Fabrizio Platt. / Product Type: Commerical /  [address]  at [facility] DULCE ANGUIANO  [address] [facility], [address], [place] [address].  [phone]   [phone]       OUTPATIENT PHYSICAL THERAPY: Daily Treatment Note 2021  Visit Count: 7     ICD-10: Treatment Diagnosis: Cervicalgia (M54.2)  Precautions/Allergies: s/p reverse L TSA 20. From EMR: Codeine allergy  TREATMENT PLAN:  Effective Dates: 2021 TO 3/8/2021 (60 days). Frequency/Duration: 1-2 time a week for 60 Days MEDICAL/REFERRING DIAGNOSIS: L shoulder, neck   DATE OF ONSET: 2020  REFERRING PHYSICIAN: Sushma Dong MD MD Orders: Eval and Treat; HEP; Strengthening; ROM; Traction; \"Dry Needling, Deep massage, all modalities\" (2021)  Return MD Appointment:        Pre-treatment Symptoms/Complaints:  Says her neck is good. Just feels the soreness when she first lies down. Had chiro treatment on Monday and did traction at 25#. Doing chiro treatment 1x/week now. Pain: Initial:   No pain now. Post Session:  0/10   Medications Last Reviewed:  2021    Updated Objective Findings:   Palpation: Tender to C7 and T1.   ROM: Cervical AROM: full and WNL's. No pain at end ranges. TREATMENT:     Manual Therapies: (20 Minutes): In sitting L transverse glide to C7/T1 with Muscle Energy Technique in lower cervical flexion and lower cervical extension for L rotated segment. Mobilization with movement with central PA glide to C7/T1 with sitting cervical retraction, cervical retraction/extension, full cervical flexion, x10 each; and with L shoulder forward elevation 3x10. Lower cervical/upper thoracic traction manipulation with audible noted on lower cerv technique. Therapeutic Exercise: (10 Minutes):  Instruction and performance in red band resisted cervical retractions in sitting or standing; and in active cervical extension over end of bed. Good return demonstration and understanding for HEP practice. Mechanical Traction: (15 Minutes total time): Initiated intermittent cervical traction at 25 lbs on/12 lbs off x12' total; traction angle set to affect cervicothoracic spine. No adverse reaction during or immediately post treatment. HEP: She is to continue with existing HEP and to add the exercises as instructed today. She verbalizes understanding. [employer] Portal    Treatment/Session Summary:    · Response to Treatment:  to C7/T1, but overall better. Independent with her HEP. · Communication/Consultation:  None today  · Equipment provided today:  None today  · Recommendations/Intent for next treatment session: Will follow up in 2 weeks, re-check, and review HEP to discuss discharge plan.     Total Treatment Billable Duration: 40 minutes  PT Patient Time In/Time Out  Time In: 0475  Time Out: 6 Pamela Wyatt PT    Future Appointments   Date Time Provider Asuncion Mccormick   3/3/2021  9:45 AM Elizabeth Feng, PT [facility]

## 2021-03-03 ENCOUNTER — HOSPITAL ENCOUNTER (OUTPATIENT)
Dept: PHYSICAL THERAPY | Age: 61
Discharge: HOME | End: 2021-03-03

## 2021-03-03 NOTE — PROGRESS NOTES
Juan Waterman  : 1960  Payor: [employer], [address] [place] [address] / Plan: [place] [employer] / Product Type: Commerical /  [address]  [facility]  [address] [facility], [address], [place] [address].  [phone]   [phone]       OUTPATIENT PHYSICAL THERAPY: Cancellation Note 3/3/2021     ICD-10: Treatment Diagnosis: Cervicalgia (M54.2)  Precautions/Allergies: s/p reverse L TSA 20. From EMR: Codeine allergy  TREATMENT PLAN:  Effective Dates: 2021 TO 3/8/2021 (60 days). Frequency/Duration: 1-2 time a week for 60 Days MEDICAL/REFERRING DIAGNOSIS: L shoulder, neck   DATE OF ONSET: 2020  REFERRING PHYSICIAN: Juan J Diaz MD MD Orders: Eval and Treat; HEP; Strengthening; ROM; Traction; \"Dry Needling, Deep massage, all modalities\" (2021)  Return MD Appointment:        Juan Waterman called to cancel her appointment today due to illness. We will plan to follow up when she is able.      Jm Arroyo, PT    Future Appointments   Date Time Provider Asuncion Mccormick   3/3/2021  9:45 AM Lamar Hammonds, PT [facility]

## 2021-04-29 ENCOUNTER — APPOINTMENT (OUTPATIENT)
Dept: GENERAL RADIOLOGY | Age: 61
DRG: 177 | End: 2021-04-29
Attending: EMERGENCY MEDICINE
Payer: COMMERCIAL

## 2021-04-29 ENCOUNTER — HOSPITAL ENCOUNTER (EMERGENCY)
Age: 61
Discharge: HOME | DRG: 177 | End: 2021-04-29
Attending: EMERGENCY MEDICINE
Payer: COMMERCIAL

## 2021-04-29 VITALS
TEMPERATURE: 100.6 F | WEIGHT: 170 LBS | OXYGEN SATURATION: 94 % | HEART RATE: 71 BPM | SYSTOLIC BLOOD PRESSURE: 118 MMHG | HEIGHT: 66 IN | RESPIRATION RATE: 18 BRPM | BODY MASS INDEX: 27.32 KG/M2 | DIASTOLIC BLOOD PRESSURE: 64 MMHG

## 2021-04-29 DIAGNOSIS — R53.83 MALAISE AND FATIGUE: ICD-10-CM

## 2021-04-29 DIAGNOSIS — U07.1 COVID-19: Primary | ICD-10-CM

## 2021-04-29 DIAGNOSIS — R53.81 MALAISE AND FATIGUE: ICD-10-CM

## 2021-04-29 DIAGNOSIS — R06.02 SOB (SHORTNESS OF BREATH): ICD-10-CM

## 2021-04-29 LAB
ALBUMIN SERPL-MCNC: 3.5 G/DL (ref 3.5–5)
ALBUMIN/GLOB SERPL: 0.9 {RATIO} (ref 1.2–3.5)
ALP SERPL-CCNC: 114 U/L (ref 50–136)
ALT SERPL-CCNC: 64 U/L (ref 12–65)
ANION GAP SERPL CALC-SCNC: 7 MMOL/L (ref 7–16)
AST SERPL-CCNC: 86 U/L (ref 15–37)
ATRIAL RATE: 94 BPM
BASOPHILS # BLD: 0 K/UL (ref 0–0.2)
BASOPHILS NFR BLD: 0 % (ref 0–2)
BILIRUB SERPL-MCNC: 0.3 MG/DL (ref 0.2–1.1)
BUN SERPL-MCNC: 11 MG/DL (ref 6–23)
CALCIUM SERPL-MCNC: 9.1 MG/DL (ref 8.3–10.4)
CALCULATED P AXIS, ECG09: 80 DEGREES
CALCULATED R AXIS, ECG10: -116 DEGREES
CALCULATED T AXIS, ECG11: 72 DEGREES
CHLORIDE SERPL-SCNC: 107 MMOL/L (ref 98–107)
CO2 SERPL-SCNC: 24 MMOL/L (ref 21–32)
CREAT SERPL-MCNC: 0.72 MG/DL (ref 0.6–1)
DIAGNOSIS, 93000: NORMAL
DIFFERENTIAL METHOD BLD: ABNORMAL
EOSINOPHIL # BLD: 0 K/UL (ref 0–0.8)
EOSINOPHIL NFR BLD: 0 % (ref 0.5–7.8)
ERYTHROCYTE [DISTWIDTH] IN BLOOD BY AUTOMATED COUNT: 14 % (ref 11.9–14.6)
GLOBULIN SER CALC-MCNC: 4 G/DL (ref 2.3–3.5)
GLUCOSE SERPL-MCNC: 96 MG/DL (ref 65–100)
HCT VFR BLD AUTO: 38.4 % (ref 35.8–46.3)
HGB BLD-MCNC: 12.9 G/DL (ref 11.7–15.4)
IMM GRANULOCYTES # BLD AUTO: 0 K/UL (ref 0–0.5)
IMM GRANULOCYTES NFR BLD AUTO: 0 % (ref 0–5)
LACTATE SERPL-SCNC: 0.8 MMOL/L (ref 0.4–2)
LIPASE SERPL-CCNC: 123 U/L (ref 73–393)
LYMPHOCYTES # BLD: 1 K/UL (ref 0.5–4.6)
LYMPHOCYTES NFR BLD: 33 % (ref 13–44)
MAGNESIUM SERPL-MCNC: 1.9 MG/DL (ref 1.8–2.4)
MCH RBC QN AUTO: 31.7 PG (ref 26.1–32.9)
MCHC RBC AUTO-ENTMCNC: 33.6 G/DL (ref 31.4–35)
MCV RBC AUTO: 94.3 FL (ref 79.6–97.8)
MONOCYTES # BLD: 0.2 K/UL (ref 0.1–1.3)
MONOCYTES NFR BLD: 7 % (ref 4–12)
NEUTS SEG # BLD: 1.8 K/UL (ref 1.7–8.2)
NEUTS SEG NFR BLD: 59 % (ref 43–78)
NRBC # BLD: 0 K/UL (ref 0–0.2)
P-R INTERVAL, ECG05: 134 MS
PLATELET # BLD AUTO: 198 K/UL (ref 150–450)
PMV BLD AUTO: 11.1 FL (ref 9.4–12.3)
POTASSIUM SERPL-SCNC: 4.1 MMOL/L (ref 3.5–5.1)
PROT SERPL-MCNC: 7.5 G/DL (ref 6.3–8.2)
Q-T INTERVAL, ECG07: 322 MS
QRS DURATION, ECG06: 74 MS
QTC CALCULATION (BEZET), ECG08: 402 MS
RBC # BLD AUTO: 4.07 M/UL (ref 4.05–5.2)
SODIUM SERPL-SCNC: 138 MMOL/L (ref 136–145)
TROPONIN-HIGH SENSITIVITY: 8.6 PG/ML (ref 0–14)
VENTRICULAR RATE, ECG03: 94 BPM
WBC # BLD AUTO: 3 K/UL (ref 4.3–11.1)

## 2021-04-29 PROCEDURE — 93005 ELECTROCARDIOGRAM TRACING: CPT | Performed by: EMERGENCY MEDICINE

## 2021-04-29 PROCEDURE — 99284 EMERGENCY DEPT VISIT MOD MDM: CPT

## 2021-04-29 PROCEDURE — 83605 ASSAY OF LACTIC ACID: CPT

## 2021-04-29 PROCEDURE — 87040 BLOOD CULTURE FOR BACTERIA: CPT

## 2021-04-29 PROCEDURE — 85025 COMPLETE CBC W/AUTO DIFF WBC: CPT

## 2021-04-29 PROCEDURE — 83690 ASSAY OF LIPASE: CPT

## 2021-04-29 PROCEDURE — 84484 ASSAY OF TROPONIN QUANT: CPT

## 2021-04-29 PROCEDURE — 96374 THER/PROPH/DIAG INJ IV PUSH: CPT

## 2021-04-29 PROCEDURE — 74011250636 HC RX REV CODE- 250/636: Performed by: EMERGENCY MEDICINE

## 2021-04-29 PROCEDURE — 96375 TX/PRO/DX INJ NEW DRUG ADDON: CPT

## 2021-04-29 PROCEDURE — 83735 ASSAY OF MAGNESIUM: CPT

## 2021-04-29 PROCEDURE — 80053 COMPREHEN METABOLIC PANEL: CPT

## 2021-04-29 PROCEDURE — 71046 X-RAY EXAM CHEST 2 VIEWS: CPT

## 2021-04-29 RX ORDER — DEXAMETHASONE 6 MG/1
6 TABLET ORAL DAILY
Qty: 5 TAB | Refills: 0 | Status: SHIPPED | OUTPATIENT
Start: 2021-04-29 | End: 2021-05-06

## 2021-04-29 RX ORDER — ONDANSETRON 2 MG/ML
4 INJECTION INTRAMUSCULAR; INTRAVENOUS
Status: COMPLETED | OUTPATIENT
Start: 2021-04-29 | End: 2021-04-29

## 2021-04-29 RX ORDER — KETOROLAC TROMETHAMINE 30 MG/ML
30 INJECTION, SOLUTION INTRAMUSCULAR; INTRAVENOUS
Status: COMPLETED | OUTPATIENT
Start: 2021-04-29 | End: 2021-04-29

## 2021-04-29 RX ADMIN — ONDANSETRON 4 MG: 2 INJECTION INTRAMUSCULAR; INTRAVENOUS at 12:12

## 2021-04-29 RX ADMIN — SODIUM CHLORIDE 1000 ML: 900 INJECTION, SOLUTION INTRAVENOUS at 12:12

## 2021-04-29 RX ADMIN — KETOROLAC TROMETHAMINE 30 MG: 30 INJECTION, SOLUTION INTRAMUSCULAR; INTRAVENOUS at 12:12

## 2021-04-29 NOTE — ED TRIAGE NOTES
Pt arrives with complaints of being Covid Positive since Saturday. States sharp pains in chest and fevers.

## 2021-04-29 NOTE — ED NOTES

## 2021-04-29 NOTE — ED PROVIDER NOTES
Patient started feeling bad on Saturday. Tested for Covid with positive result on Monday. Today is day 7 with symptoms. Feeling worse. Has mild cough with shortness of breath. Diarrhea also. Feeling weak and fatigued. No nausea or vomiting. The history is provided by the patient. No  was used.    Positive For Covid-19  Temp source:  Subjective  Duration:  7 days  Timing:  Constant  Progression:  Worsening  Chronicity:  New  Relieved by:  Nothing  Worsened by:  Nothing  Associated symptoms: chills, cough, diarrhea and myalgias    Associated symptoms: no chest pain, no confusion, no congestion, no dysuria, no ear pain, no headaches, no nausea, no rash, no rhinorrhea, no sore throat and no vomiting         Past Medical History:   Diagnosis Date    Allergic rhinitis 3/7/2013    Chronic sinusitis     Deviated nasal septum     Eustachian tube dysfunction     Fatigue 3/7/2013    High frequency hearing loss     Hypertrophy of nasal turbinates     Migraine headache     Otalgia     SNHL (sensorineural hearing loss)     Tinnitus     intermittent    TMJ (temporomandibular joint disorder)        Past Surgical History:   Procedure Laterality Date    HX GYN  2009    uterine ablation    HX ORTHOPAEDIC Left 2018    arm/shoulder         Family History:   Problem Relation Age of Onset    Hypertension Father     Heart Disease Father     Elevated Lipids Father     Diabetes Father     Cancer Father         leukemia/lung-smoker    Hypertension Brother     Diabetes Brother     Elevated Lipids Brother     Liver Disease Mother         autoimmune hepatitis       Social History     Socioeconomic History    Marital status:      Spouse name: Not on file    Number of children: Not on file    Years of education: Not on file    Highest education level: Not on file   Occupational History    Not on file   Social Needs    Financial resource strain: Not on file    Food insecurity Worry: Not on file     Inability: Not on file    Transportation needs     Medical: Not on file     Non-medical: Not on file   Tobacco Use    Smoking status: Never Smoker    Smokeless tobacco: Never Used   Substance and Sexual Activity    Alcohol use: Never     Alcohol/week: 0.8 standard drinks     Types: 1 Glasses of wine per week     Frequency: Never    Drug use: Never    Sexual activity: Not on file   Lifestyle    Physical activity     Days per week: Not on file     Minutes per session: Not on file    Stress: Not on file   Relationships    Social connections     Talks on phone: Not on file     Gets together: Not on file     Attends Uats[employer] service: Not on file     Active member of club or organization: Not on file     Attends meetings of clubs or organizations: Not on file     Relationship status: Not on file    Intimate partner violence     Fear of current or ex partner: Not on file     Emotionally abused: Not on file     Physically abused: Not on file     Forced sexual activity: Not on file   Other Topics Concern    Not on file   Social History Narrative    Not on file         ALLERGIES: Codeine    Review of Systems   Constitutional: Positive for chills, fatigue and fever (subjective). HENT: Negative for congestion, ear pain, rhinorrhea and sore throat. Eyes: Negative for pain and redness. Respiratory: Positive for cough and shortness of breath. Negative for chest tightness and wheezing. Cardiovascular: Negative for chest pain and leg swelling. Gastrointestinal: Positive for diarrhea. Negative for abdominal pain, nausea and vomiting. Genitourinary: Negative for dysuria and hematuria. Musculoskeletal: Positive for myalgias. Negative for back pain, gait problem, neck pain and neck stiffness. Skin: Negative for color change and rash. Neurological: Negative for weakness, numbness and headaches. Psychiatric/Behavioral: Negative for confusion.    All other systems reviewed and are negative. Vitals:    04/29/21 1049   BP: 122/76   Pulse: 89   Resp: 18   Temp: (!) 100.6 °F (38.1 °C)   SpO2: 92%   Weight: 77.1 kg (170 lb)   Height: 5' 6\" (1.676 m)            Physical Exam  Constitutional:       Appearance: Normal appearance. She is well-developed. HENT:      Head: Normocephalic and atraumatic. Neck:      Musculoskeletal: Normal range of motion and neck supple. Cardiovascular:      Rate and Rhythm: Normal rate and regular rhythm. Pulmonary:      Effort: Respiratory distress (mild increased effort) present. Breath sounds: Normal breath sounds. No wheezing. Abdominal:      General: Bowel sounds are normal.      Palpations: Abdomen is soft. Tenderness: There is no abdominal tenderness. Musculoskeletal: Normal range of motion. General: No swelling. Skin:     General: Skin is warm and dry. Neurological:      Mental Status: She is alert and oriented to person, place, and time. MDM  Number of Diagnoses or Management Options  Diagnosis management comments: Ambulatory pulse ox 94%. Patient with Covid feeling worse. Has finger pulse ox probe at home. No acute on chest x-ray or blood work here. Will discharge with continued rest and isolation at home. Amount and/or Complexity of Data Reviewed  Clinical lab tests: ordered and reviewed  Tests in the radiology section of CPT®: ordered and reviewed  Tests in the medicine section of CPT®: ordered and reviewed    Patient Progress  Patient progress: stable         Procedures          EKG: normal sinus rhythm, nonspecific ST and T waves changes. Rate 94. XR CHEST AP LAT (Final result)  Result time 04/29/21 11:59:23  Final result by Michelle Almonte MD (04/29/21 11:59:23)                Impression:    Minimal linear atelectasis of the right lower lobe. Narrative:    EXAM: XR CHEST AP LAT     INDICATION: chest pain     COMPARISON: None.      FINDINGS: PA and lateral radiographs of the chest demonstrate minimal linear   atelectasis of the right lower lobe. The cardiac and mediastinal contours and   pulmonary vascularity are normal. The bones and soft tissues are within normal   limits.                   Results Include:    Recent Results (from the past 24 hour(s))   EKG, 12 LEAD, INITIAL    Collection Time: 04/29/21 10:52 AM   Result Value Ref Range    Ventricular Rate 94 BPM    Atrial Rate 94 BPM    P-R Interval 134 ms    QRS Duration 74 ms    Q-T Interval 322 ms    QTC Calculation (Bezet) 402 ms    Calculated P Axis 80 degrees    Calculated R Axis -116 degrees    Calculated T Axis 72 degrees    Diagnosis       Normal sinus rhythm  Right superior axis deviation  Pulmonary disease pattern  Abnormal ECG  No previous ECGs available  Confirmed by Myra Nunez MD (), ELLY MENDOSA ([id number]) on 4/29/2021 11:31:29 AM     TROPONIN-HIGH SENSITIVITY    Collection Time: 04/29/21 10:56 AM   Result Value Ref Range    Troponin-High Sensitivity 8.6 0 - 14 pg/mL   CBC WITH AUTOMATED DIFF    Collection Time: 04/29/21 10:56 AM   Result Value Ref Range    WBC 3.0 (L) 4.3 - 11.1 K/uL    RBC 4.07 4.05 - 5.2 M/uL    HGB 12.9 11.7 - 15.4 g/dL    HCT 38.4 35.8 - 46.3 %    MCV 94.3 79.6 - 97.8 FL    MCH 31.7 26.1 - 32.9 PG    MCHC 33.6 31.4 - 35.0 g/dL    RDW 14.0 11.9 - 14.6 %    PLATELET 334 773 - 628 K/uL    MPV 11.1 9.4 - 12.3 FL    ABSOLUTE NRBC 0.00 0.0 - 0.2 K/uL    DF AUTOMATED      NEUTROPHILS 59 43 - 78 %    LYMPHOCYTES 33 13 - 44 %    MONOCYTES 7 4.0 - 12.0 %    EOSINOPHILS 0 (L) 0.5 - 7.8 %    BASOPHILS 0 0.0 - 2.0 %    IMMATURE GRANULOCYTES 0 0.0 - 5.0 %    ABS. NEUTROPHILS 1.8 1.7 - 8.2 K/UL    ABS. LYMPHOCYTES 1.0 0.5 - 4.6 K/UL    ABS. MONOCYTES 0.2 0.1 - 1.3 K/UL    ABS. EOSINOPHILS 0.0 0.0 - 0.8 K/UL    ABS. BASOPHILS 0.0 0.0 - 0.2 K/UL    ABS. IMM.  GRANS. 0.0 0.0 - 0.5 K/UL   METABOLIC PANEL, COMPREHENSIVE    Collection Time: 04/29/21 10:56 AM   Result Value Ref Range    Sodium 138 136 - 145 mmol/L Potassium 4.1 3.5 - 5.1 mmol/L    Chloride 107 98 - 107 mmol/L    CO2 24 21 - 32 mmol/L    Anion gap 7 7 - 16 mmol/L    Glucose 96 65 - 100 mg/dL    BUN 11 6 - 23 MG/DL    Creatinine 0.72 0.6 - 1.0 MG/DL    GFR est AA >60 >60 ml/min/1.73m2    GFR est non-AA >60 >60 ml/min/1.73m2    Calcium 9.1 8.3 - 10.4 MG/DL    Bilirubin, total 0.3 0.2 - 1.1 MG/DL    ALT (SGPT) 64 12 - 65 U/L    AST (SGOT) 86 (H) 15 - 37 U/L    Alk.  phosphatase 114 50 - 136 U/L    Protein, total 7.5 6.3 - 8.2 g/dL    Albumin 3.5 3.5 - 5.0 g/dL    Globulin 4.0 (H) 2.3 - 3.5 g/dL    A-G Ratio 0.9 (L) 1.2 - 3.5     LIPASE    Collection Time: 04/29/21 10:56 AM   Result Value Ref Range    Lipase 123 73 - 393 U/L   MAGNESIUM    Collection Time: 04/29/21 10:56 AM   Result Value Ref Range    Magnesium 1.9 1.8 - 2.4 mg/dL   LACTIC ACID    Collection Time: 04/29/21 10:58 AM   Result Value Ref Range    Lactic acid 0.8 0.4 - 2.0 MMOL/L

## 2021-04-29 NOTE — ACP (ADVANCE CARE PLANNING)
Advance Care Planning     Advance Care Planning Activator (Inpatient)  Conversation Note      Date of ACP Conversation: 04/29/21     Conversation Conducted with:   Patient with Decision Making Capacity    ACP Activator: [address] Decision Maker:    Current Designated Health Care Decision Maker:     Primary Decision Maker: Brenden shaq - [phone]    Secondary Decision Maker: iEleen Salamanca - [phone]  Click here to complete [address] including selection of the Healthcare Decision Maker Relationship (ie \"Primary\")  Today we documented Decision Maker(s) consistent with Legal Next of Kin hierarchy. Care Preferences    Ventilation: \"If you were in your present state of health and suddenly became very ill and were unable to breathe on your own, what would your preference be about the use of a ventilator (breathing machine) if it were available to you? \"      If patient would desire the use of a ventilator (breathing machine), answer \"yes\", if not \"no\":yes    \"If your health worsens and it becomes clear that your chance of recovery is unlikely, what would your preference be about the use of a ventilator (breathing machine) if it were available to you? \"     Would the patient desire the use of a ventilator (breathing machine)? YES      Resuscitation  \"CPR works best to restart the heart when there is a sudden event, like a heart attack, in someone who is otherwise healthy. Unfortunately, CPR does not typically restart the heart for people who have serious health conditions or who are very sick. \"    \"In the event your heart stopped as a result of an underlying serious health condition, would you want attempts to be made to restart your heart (answer \"yes\" for attempt to resuscitate) or would you prefer a natural death (answer \"no\" for do not attempt to resuscitate)? \" yes      [] Yes  [x] No   Educated Patient / Ephraim Stahl regarding differences between Advance Directives and portable DNR orders.     Conversation Outcomes:  [x] ACP discussion completed  [] Existing advance directive reviewed with patient; no changes to patient's previously recorded wishes     [] New Advance Directive completed   [] Portable Do Not Resuscitate prepared for Provider review and signature  [] POLST/POST/MOLST/MOST prepared for Provider review and signature      Follow-up plan:    [] Schedule follow-up conversation to continue planning  [] Referred individual to Provider for additional questions/concerns   [] Advised patient/agent/surrogate to review completed ACP document and update if needed with changes in condition, patient preferences or care setting     [] This note routed to one or more involved healthcare providers

## 2021-05-02 ENCOUNTER — APPOINTMENT (OUTPATIENT)
Dept: GENERAL RADIOLOGY | Age: 61
DRG: 177 | End: 2021-05-02
Attending: EMERGENCY MEDICINE
Payer: COMMERCIAL

## 2021-05-02 ENCOUNTER — HOSPITAL ENCOUNTER (INPATIENT)
Age: 61
LOS: 4 days | Discharge: HOME | DRG: 177 | End: 2021-05-06
Attending: EMERGENCY MEDICINE | Admitting: INTERNAL MEDICINE
Payer: COMMERCIAL

## 2021-05-02 ENCOUNTER — NURSE TRIAGE (OUTPATIENT)
Dept: OTHER | Facility: CLINIC | Age: 61
End: 2021-05-02

## 2021-05-02 DIAGNOSIS — U07.1 COVID-19 VIRUS INFECTION: Primary | ICD-10-CM

## 2021-05-02 DIAGNOSIS — R09.02 HYPOXIA: ICD-10-CM

## 2021-05-02 PROBLEM — J12.82 PNEUMONIA DUE TO COVID-19 VIRUS: Status: ACTIVE | Noted: 2021-05-02

## 2021-05-02 LAB
ABO + RH BLD: NORMAL
ALBUMIN SERPL-MCNC: 2.8 G/DL (ref 3.5–5)
ALBUMIN SERPL-MCNC: 3.3 G/DL (ref 3.5–5)
ALBUMIN/GLOB SERPL: 0.7 {RATIO} (ref 1.2–3.5)
ALBUMIN/GLOB SERPL: 0.8 {RATIO} (ref 1.2–3.5)
ALP SERPL-CCNC: 102 U/L (ref 50–136)
ALP SERPL-CCNC: 95 U/L (ref 50–136)
ALT SERPL-CCNC: 45 U/L (ref 12–65)
ALT SERPL-CCNC: 46 U/L (ref 12–65)
ANION GAP SERPL CALC-SCNC: 7 MMOL/L (ref 7–16)
ANION GAP SERPL CALC-SCNC: 9 MMOL/L (ref 7–16)
AST SERPL-CCNC: 39 U/L (ref 15–37)
AST SERPL-CCNC: 43 U/L (ref 15–37)
BASOPHILS # BLD: 0 K/UL (ref 0–0.2)
BASOPHILS NFR BLD: 0 % (ref 0–2)
BILIRUB SERPL-MCNC: 0.2 MG/DL (ref 0.2–1.1)
BILIRUB SERPL-MCNC: 0.3 MG/DL (ref 0.2–1.1)
BLOOD GROUP ANTIBODIES SERPL: NORMAL
BUN SERPL-MCNC: 14 MG/DL (ref 6–23)
BUN SERPL-MCNC: 16 MG/DL (ref 6–23)
CALCIUM SERPL-MCNC: 8 MG/DL (ref 8.3–10.4)
CALCIUM SERPL-MCNC: 9 MG/DL (ref 8.3–10.4)
CHLORIDE SERPL-SCNC: 108 MMOL/L (ref 98–107)
CHLORIDE SERPL-SCNC: 112 MMOL/L (ref 98–107)
CO2 SERPL-SCNC: 22 MMOL/L (ref 21–32)
CO2 SERPL-SCNC: 25 MMOL/L (ref 21–32)
CREAT SERPL-MCNC: 0.57 MG/DL (ref 0.6–1)
CREAT SERPL-MCNC: 0.67 MG/DL (ref 0.6–1)
D DIMER PPP FEU-MCNC: 0.5 UG/ML(FEU)
DIFFERENTIAL METHOD BLD: ABNORMAL
EOSINOPHIL # BLD: 0 K/UL (ref 0–0.8)
EOSINOPHIL NFR BLD: 0 % (ref 0.5–7.8)
ERYTHROCYTE [DISTWIDTH] IN BLOOD BY AUTOMATED COUNT: 13.7 % (ref 11.9–14.6)
GLOBULIN SER CALC-MCNC: 3.9 G/DL (ref 2.3–3.5)
GLOBULIN SER CALC-MCNC: 3.9 G/DL (ref 2.3–3.5)
GLUCOSE SERPL-MCNC: 103 MG/DL (ref 65–100)
GLUCOSE SERPL-MCNC: 151 MG/DL (ref 65–100)
HCT VFR BLD AUTO: 36.6 % (ref 35.8–46.3)
HGB BLD-MCNC: 12.1 G/DL (ref 11.7–15.4)
IMM GRANULOCYTES # BLD AUTO: 0 K/UL (ref 0–0.5)
IMM GRANULOCYTES NFR BLD AUTO: 1 % (ref 0–5)
LYMPHOCYTES # BLD: 0.8 K/UL (ref 0.5–4.6)
LYMPHOCYTES NFR BLD: 13 % (ref 13–44)
MAGNESIUM SERPL-MCNC: 2.4 MG/DL (ref 1.8–2.4)
MCH RBC QN AUTO: 30.8 PG (ref 26.1–32.9)
MCHC RBC AUTO-ENTMCNC: 33.1 G/DL (ref 31.4–35)
MCV RBC AUTO: 93.1 FL (ref 79.6–97.8)
MONOCYTES # BLD: 0.6 K/UL (ref 0.1–1.3)
MONOCYTES NFR BLD: 10 % (ref 4–12)
NEUTS SEG # BLD: 5.1 K/UL (ref 1.7–8.2)
NEUTS SEG NFR BLD: 77 % (ref 43–78)
NRBC # BLD: 0 K/UL (ref 0–0.2)
PLATELET # BLD AUTO: 238 K/UL (ref 150–450)
PMV BLD AUTO: 10.5 FL (ref 9.4–12.3)
POTASSIUM SERPL-SCNC: 3.5 MMOL/L (ref 3.5–5.1)
POTASSIUM SERPL-SCNC: 3.6 MMOL/L (ref 3.5–5.1)
PROCALCITONIN SERPL-MCNC: <0.05 NG/ML
PROT SERPL-MCNC: 6.7 G/DL (ref 6.3–8.2)
PROT SERPL-MCNC: 7.2 G/DL (ref 6.3–8.2)
RBC # BLD AUTO: 3.93 M/UL (ref 4.05–5.2)
SODIUM SERPL-SCNC: 140 MMOL/L (ref 136–145)
SODIUM SERPL-SCNC: 143 MMOL/L (ref 136–145)
SPECIMEN EXP DATE BLD: NORMAL
TROPONIN-HIGH SENSITIVITY: 8.1 PG/ML (ref 0–14)
TROPONIN-HIGH SENSITIVITY: 8.3 PG/ML (ref 0–14)
WBC # BLD AUTO: 6.7 K/UL (ref 4.3–11.1)

## 2021-05-02 PROCEDURE — 80053 COMPREHEN METABOLIC PANEL: CPT

## 2021-05-02 PROCEDURE — 96365 THER/PROPH/DIAG IV INF INIT: CPT

## 2021-05-02 PROCEDURE — 99285 EMERGENCY DEPT VISIT HI MDM: CPT

## 2021-05-02 PROCEDURE — 85379 FIBRIN DEGRADATION QUANT: CPT

## 2021-05-02 PROCEDURE — 71045 X-RAY EXAM CHEST 1 VIEW: CPT

## 2021-05-02 PROCEDURE — 84145 PROCALCITONIN (PCT): CPT

## 2021-05-02 PROCEDURE — 74011250636 HC RX REV CODE- 250/636: Performed by: INTERNAL MEDICINE

## 2021-05-02 PROCEDURE — 83735 ASSAY OF MAGNESIUM: CPT

## 2021-05-02 PROCEDURE — 85025 COMPLETE CBC W/AUTO DIFF WBC: CPT

## 2021-05-02 PROCEDURE — 65270000029 HC RM PRIVATE

## 2021-05-02 PROCEDURE — 84484 ASSAY OF TROPONIN QUANT: CPT

## 2021-05-02 PROCEDURE — 74011000258 HC RX REV CODE- 258: Performed by: INTERNAL MEDICINE

## 2021-05-02 PROCEDURE — 93005 ELECTROCARDIOGRAM TRACING: CPT | Performed by: EMERGENCY MEDICINE

## 2021-05-02 PROCEDURE — XW13325 TRANSFUSION OF CONVALESCENT PLASMA (NONAUTOLOGOUS) INTO PERIPHERAL VEIN, PERCUTANEOUS APPROACH, NEW TECHNOLOGY GROUP 5: ICD-10-PCS | Performed by: INTERNAL MEDICINE

## 2021-05-02 PROCEDURE — 74011250636 HC RX REV CODE- 250/636: Performed by: EMERGENCY MEDICINE

## 2021-05-02 PROCEDURE — 36430 TRANSFUSION BLD/BLD COMPNT: CPT

## 2021-05-02 PROCEDURE — 96375 TX/PRO/DX INJ NEW DRUG ADDON: CPT

## 2021-05-02 PROCEDURE — 86901 BLOOD TYPING SEROLOGIC RH(D): CPT

## 2021-05-02 RX ORDER — ONDANSETRON 2 MG/ML
4 INJECTION INTRAMUSCULAR; INTRAVENOUS
Status: DISCONTINUED | OUTPATIENT
Start: 2021-05-02 | End: 2021-05-06 | Stop reason: HOSPADM

## 2021-05-02 RX ORDER — SODIUM CHLORIDE 9 MG/ML
250 INJECTION, SOLUTION INTRAVENOUS AS NEEDED
Status: DISCONTINUED | OUTPATIENT
Start: 2021-05-02 | End: 2021-05-06 | Stop reason: HOSPADM

## 2021-05-02 RX ORDER — PROMETHAZINE HYDROCHLORIDE 25 MG/1
12.5 TABLET ORAL
Status: DISCONTINUED | OUTPATIENT
Start: 2021-05-02 | End: 2021-05-06 | Stop reason: HOSPADM

## 2021-05-02 RX ORDER — KETOROLAC TROMETHAMINE 30 MG/ML
30 INJECTION, SOLUTION INTRAMUSCULAR; INTRAVENOUS
Status: COMPLETED | OUTPATIENT
Start: 2021-05-02 | End: 2021-05-02

## 2021-05-02 RX ORDER — IPRATROPIUM BROMIDE AND ALBUTEROL SULFATE 2.5; .5 MG/3ML; MG/3ML
3 SOLUTION RESPIRATORY (INHALATION)
Status: DISCONTINUED | OUTPATIENT
Start: 2021-05-02 | End: 2021-05-04

## 2021-05-02 RX ORDER — AZITHROMYCIN 250 MG/1
500 TABLET, FILM COATED ORAL DAILY
Status: DISCONTINUED | OUTPATIENT
Start: 2021-05-03 | End: 2021-05-04

## 2021-05-02 RX ORDER — POLYETHYLENE GLYCOL 3350 17 G/17G
17 POWDER, FOR SOLUTION ORAL DAILY PRN
Status: DISCONTINUED | OUTPATIENT
Start: 2021-05-02 | End: 2021-05-06 | Stop reason: HOSPADM

## 2021-05-02 RX ORDER — ENOXAPARIN SODIUM 100 MG/ML
30 INJECTION SUBCUTANEOUS EVERY 12 HOURS
Status: DISCONTINUED | OUTPATIENT
Start: 2021-05-03 | End: 2021-05-06 | Stop reason: HOSPADM

## 2021-05-02 RX ORDER — ACETAMINOPHEN 325 MG/1
650 TABLET ORAL
Status: DISCONTINUED | OUTPATIENT
Start: 2021-05-02 | End: 2021-05-06 | Stop reason: HOSPADM

## 2021-05-02 RX ORDER — DEXAMETHASONE 4 MG/1
6 TABLET ORAL DAILY
Status: DISCONTINUED | OUTPATIENT
Start: 2021-05-02 | End: 2021-05-06 | Stop reason: HOSPADM

## 2021-05-02 RX ORDER — SODIUM CHLORIDE 0.9 % (FLUSH) 0.9 %
5-40 SYRINGE (ML) INJECTION EVERY 8 HOURS
Status: DISCONTINUED | OUTPATIENT
Start: 2021-05-02 | End: 2021-05-06 | Stop reason: HOSPADM

## 2021-05-02 RX ORDER — MELATONIN
2000 DAILY
Status: DISCONTINUED | OUTPATIENT
Start: 2021-05-10 | End: 2021-05-06 | Stop reason: HOSPADM

## 2021-05-02 RX ORDER — GUAIFENESIN/DEXTROMETHORPHAN 100-10MG/5
5 SYRUP ORAL
Status: DISCONTINUED | OUTPATIENT
Start: 2021-05-02 | End: 2021-05-06 | Stop reason: HOSPADM

## 2021-05-02 RX ORDER — SODIUM CHLORIDE 0.9 % (FLUSH) 0.9 %
5-40 SYRINGE (ML) INJECTION AS NEEDED
Status: DISCONTINUED | OUTPATIENT
Start: 2021-05-02 | End: 2021-05-06 | Stop reason: HOSPADM

## 2021-05-02 RX ORDER — ONDANSETRON 2 MG/ML
4 INJECTION INTRAMUSCULAR; INTRAVENOUS
Status: COMPLETED | OUTPATIENT
Start: 2021-05-02 | End: 2021-05-02

## 2021-05-02 RX ORDER — ACETAMINOPHEN 650 MG/1
650 SUPPOSITORY RECTAL
Status: DISCONTINUED | OUTPATIENT
Start: 2021-05-02 | End: 2021-05-06 | Stop reason: HOSPADM

## 2021-05-02 RX ADMIN — SODIUM CHLORIDE 2 G: 900 INJECTION INTRAVENOUS at 19:50

## 2021-05-02 RX ADMIN — ONDANSETRON 4 MG: 2 INJECTION INTRAMUSCULAR; INTRAVENOUS at 14:45

## 2021-05-02 RX ADMIN — DEXAMETHASONE 6 MG: 4 TABLET ORAL at 19:26

## 2021-05-02 RX ADMIN — SODIUM CHLORIDE 1000 ML: 900 INJECTION, SOLUTION INTRAVENOUS at 14:45

## 2021-05-02 RX ADMIN — KETOROLAC TROMETHAMINE 30 MG: 30 INJECTION, SOLUTION INTRAMUSCULAR at 14:45

## 2021-05-02 NOTE — ED PROVIDER NOTES
Patient with known history of COVID-19 presents to the ER complaining worsening shortness of breath, difficulty breathing and fatigue. States symptoms have been getting worse for the past couple days. Reports cough, nonproductive of sputum. States she has been checking her oxygen levels at home and they are stable when she is sitting but when she ambulates they began to drop below 90 today. The history is provided by the patient. Shortness of Breath  This is a recurrent problem. The problem occurs frequently. The current episode started more than 2 days ago. The problem has been gradually worsening. Associated symptoms include cough and chest pain. Pertinent negatives include no coryza, no rhinorrhea, no swollen glands, no neck pain, no hemoptysis, no vomiting, no abdominal pain, no rash and no leg swelling. She has tried nothing for the symptoms. She has had prior ED visits.         Past Medical History:   Diagnosis Date    Allergic rhinitis 3/7/2013    Chronic sinusitis     Deviated nasal septum     Eustachian tube dysfunction     Fatigue 3/7/2013    High frequency hearing loss     Hypertrophy of nasal turbinates     Migraine headache     Otalgia     SNHL (sensorineural hearing loss)     Tinnitus     intermittent    TMJ (temporomandibular joint disorder)        Past Surgical History:   Procedure Laterality Date    HX GYN  2009    uterine ablation    HX ORTHOPAEDIC Left 2018    arm/shoulder         Family History:   Problem Relation Age of Onset    Hypertension Father     Heart Disease Father     Elevated Lipids Father     Diabetes Father     Cancer Father         leukemia/lung-smoker    Hypertension Brother     Diabetes Brother     Elevated Lipids Brother     Liver Disease Mother         autoimmune hepatitis       Social History     Socioeconomic History    Marital status:      Spouse name: Not on file    Number of children: Not on file    Years of education: Not on file   Mikel Highest education level: Not on file   Occupational History    Not on file   Social Needs    Financial resource strain: Not on file    Food insecurity     Worry: Not on file     Inability: Not on file    Transportation needs     Medical: Not on file     Non-medical: Not on file   Tobacco Use    Smoking status: Never Smoker    Smokeless tobacco: Never Used   Substance and Sexual Activity    Alcohol use: Never     Alcohol/week: 0.8 standard drinks     Types: 1 Glasses of wine per week     Frequency: Never    Drug use: Never    Sexual activity: Not on file   Lifestyle    Physical activity     Days per week: Not on file     Minutes per session: Not on file    Stress: Not on file   Relationships    Social connections     Talks on phone: Not on file     Gets together: Not on file     Attends Anabaptist service: Not on file     Active member of club or organization: Not on file     Attends meetings of clubs or organizations: Not on file     Relationship status: Not on file    Intimate partner violence     Fear of current or ex partner: Not on file     Emotionally abused: Not on file     Physically abused: Not on file     Forced sexual activity: Not on file   Other Topics Concern    Not on file   Social History Narrative    Not on file         ALLERGIES: Codeine    Review of Systems   Constitutional: Positive for chills and fatigue. HENT: Negative for congestion, dental problem, rhinorrhea, trouble swallowing and voice change. Eyes: Negative for photophobia and redness. Respiratory: Positive for cough, chest tightness and shortness of breath. Negative for hemoptysis. Cardiovascular: Positive for chest pain. Negative for palpitations and leg swelling. Gastrointestinal: Negative for abdominal pain, nausea and vomiting. Endocrine: Negative for polydipsia and polyuria. Genitourinary: Negative for decreased urine volume, flank pain and urgency.    Musculoskeletal: Negative for back pain and neck pain.   Skin: Negative for color change and rash. Neurological: Negative for speech difficulty, weakness and numbness. Hematological: Negative for adenopathy. Psychiatric/Behavioral: Negative for behavioral problems and confusion. All other systems reviewed and are negative. Vitals:    05/02/21 1228 05/02/21 1431   BP: 125/71 130/68   Pulse: 64 65   Resp: 16    Temp: 99.6 °F (37.6 °C)    SpO2: 93% 92%   Weight: 77.1 kg (170 lb)    Height: 5' 6\" (1.676 m)             Physical Exam  Vitals signs and nursing note reviewed. Constitutional:       General: She is not in acute distress. Appearance: Normal appearance. She is not ill-appearing. HENT:      Head: Normocephalic and atraumatic. Right Ear: Tympanic membrane normal.      Left Ear: Tympanic membrane normal.      Nose: Nose normal. No congestion or rhinorrhea. Mouth/Throat:      Mouth: Mucous membranes are moist.      Pharynx: No oropharyngeal exudate or posterior oropharyngeal erythema. Eyes:      General:         Right eye: No discharge. Left eye: No discharge. Extraocular Movements: Extraocular movements intact. Pupils: Pupils are equal, round, and reactive to light. Neck:      Musculoskeletal: Normal range of motion and neck supple. No neck rigidity. Cardiovascular:      Rate and Rhythm: Normal rate and regular rhythm. Pulses: Normal pulses. Heart sounds: Normal heart sounds. No murmur. No friction rub. Pulmonary:      Effort: Pulmonary effort is normal. No respiratory distress. Breath sounds: No stridor. Rhonchi and rales present. No wheezing. Abdominal:      General: Abdomen is flat. Bowel sounds are normal. There is no distension. Palpations: There is no mass. Tenderness: There is no abdominal tenderness. Hernia: No hernia is present. Musculoskeletal: Normal range of motion. General: No swelling, tenderness or signs of injury. Skin:     General: Skin is warm. Capillary Refill: Capillary refill takes less than 2 seconds. Coloration: Skin is not pale. Neurological:      General: No focal deficit present. Mental Status: She is alert and oriented to person, place, and time. Cranial Nerves: No cranial nerve deficit. Sensory: No sensory deficit. Motor: No weakness. Coordination: Coordination normal.   Psychiatric:         Mood and Affect: Mood normal.         Behavior: Behavior normal.          MDM  Number of Diagnoses or Management Options  Diagnosis management comments: Initially not hypoxic here, will obtain chest x-ray, continue to monitor symptoms    3:17 PM  Patient is ambulatory sats initially got as low as 90 but then descended to 88%. Currently is on 2 L. Chest x-ray is consistent with Covid. Will start steroids here. Likely will require admission       Amount and/or Complexity of Data Reviewed  Clinical lab tests: ordered and reviewed  Tests in the radiology section of CPT®: ordered and reviewed  Decide to obtain previous medical records or to obtain history from someone other than the patient: yes (Lab Results  - documented in this encounter    COVID-19 (2019 Novel Coronavirus) (04/24/2021 5:36 PM EDT)  COVID-19 (2019 Novel Coronavirus) (04/24/2021 5:36 PM EDT)  Component Value Ref Range Performed At Pathologist Signature  SARS-CoV-2 RNA by TRNIIDAD (includes PCR) Positive (A)  Comment: This test was developed and its performance characteristics determined by [facility]. It has not been cleared or approved by the FDA. However, such approval/clearance is not required, as the laboratory is regulated and qualified under CLIA to perform high-complexity testing. This test is used for clinical purposes, and should not be regarded as investigational or for research.  This test has been validated in accordance with the FDA's Guidance Document 'Policy for [address] in Laboratories Certified to Perform High Complexity Testing under CLIA prior to Emergency Use Authorization for Coronavirus [id number] during the [facility] Emergency' issued on February 29th, 2020. FDA independent review of this validation is pending. This test is only authorized for the duration of time the declaration that circumstances exist justifying the authorization of the emergency use of in vitro diagnostic tests for detection of SARS-CoV-2 virus and/or diagnosis of COVID-19     infection under section 564(b)(1) of the Act, 21 U. S.C. 846HAQ-0(A)(2), unless the authorization is terminated or revoked sooner. The Analytic Specificity of this test is 89.60% as determined by the Primer Blast against the organisms and gabriele found in respiratory respiratory tract and the Analytic Sensitivity of this test is 100% above the Limit of Detection of Ct = 22 as determined by the serial dilution method. Processing and Detection Methodology: Test is performed by nucleic acid extraction from media containing nasopharyngeal swabs followed by reverse transcription and real-time PCR on a [employer] Flex. Fluorescent probe sequences ([employer] COVID-19 Combo Kit) are used to amplify three regions within the SARS-CoV-2 viral genome: ORF1ab, N-gene, and S-gene. MS2 Bacteriophage control is added to each extraction sample as a quality check for extraction and amplification. Cycle threshold analysis (Ct) of the targets is used to determine the presence or absence of   SARS-CoV-2. Disclaimer: The information contained in this report is intended to be interpreted by a licensed physician or other licensed healthcare professional. This report is not intended to take the place of professional medical advice. Decisions regarding course of medical treatment must be made only after consulting with a licensed physician or other licensed healthcare professional, and should consider each patient's medical history.   This test performed by for[MD], [address] [place] [address], [address] [place], [address]  Phone: [phone] CLIA#: [id number] Negative [facility]      COVID-19 (2019 Novel Coronavirus) (04/24/2021 5:36 PM EDT)  Specimen  Swab - Respiratory - Nasopharyngeal swab (specimen)    COVID-19 (2019 Novel Coronavirus) (04/24/2021 5:36 PM EDT)  Narrative Performed At  **[facility]**     You can help others by becoming a convalescent plasma donor 14 days after your symptoms resolve.  Please call [phone] to schedule an appointment with [facility].     [facility]      COVID-19 (2019 Novel Coronavirus) (04/24/2021 5:36 PM EDT)  Performing Organization Address City/State/ZIP Code     )  Review and summarize past medical records: yes  Discuss the patient with other providers: yes  Independent visualization of images, tracings, or specimens: yes    Risk of Complications, Morbidity, and/or Mortality  Presenting problems: moderate  Diagnostic procedures: moderate  Management options: moderate           Procedures                 Results Include:    Recent Results (from the past 24 hour(s))   EKG, 12 LEAD, INITIAL    Collection Time: 05/02/21 12:29 PM   Result Value Ref Range    Ventricular Rate 62 BPM    Atrial Rate 62 BPM    P-R Interval 140 ms    QRS Duration 80 ms    Q-T Interval 388 ms    QTC Calculation (Bezet) 393 ms    Calculated P Axis 65 degrees    Calculated R Axis -19 degrees    Calculated T Axis 72 degrees    Diagnosis       Normal sinus rhythm  Normal ECG  When compared with ECG of 29-APR-2021 10:52,  Vent.  rate has decreased BY  32 BPM  QRS axis Shifted right     CBC WITH AUTOMATED DIFF    Collection Time: 05/02/21 12:32 PM   Result Value Ref Range    WBC 6.7 4.3 - 11.1 K/uL    RBC 3.93 (L) 4.05 - 5.2 M/uL    HGB 12.1 11.7 - 15.4 g/dL    HCT 36.6 35.8 - 46.3 %    MCV 93.1 79.6 - 97.8 FL    MCH 30.8 26.1 - 32.9 PG    MCHC 33.1 31.4 - 35.0 g/dL    RDW 13.7 11.9 - 14.6 %    PLATELET 003 484 - 056 K/uL    MPV 10.5 9.4 - 12.3 FL    ABSOLUTE NRBC 0. 00 0.0 - 0.2 K/uL    DF AUTOMATED      NEUTROPHILS 77 43 - 78 %    LYMPHOCYTES 13 13 - 44 %    MONOCYTES 10 4.0 - 12.0 %    EOSINOPHILS 0 (L) 0.5 - 7.8 %    BASOPHILS 0 0.0 - 2.0 %    IMMATURE GRANULOCYTES 1 0.0 - 5.0 %    ABS. NEUTROPHILS 5.1 1.7 - 8.2 K/UL    ABS. LYMPHOCYTES 0.8 0.5 - 4.6 K/UL    ABS. MONOCYTES 0.6 0.1 - 1.3 K/UL    ABS. EOSINOPHILS 0.0 0.0 - 0.8 K/UL    ABS. BASOPHILS 0.0 0.0 - 0.2 K/UL    ABS. IMM. GRANS. 0.0 0.0 - 0.5 K/UL   METABOLIC PANEL, COMPREHENSIVE    Collection Time: 05/02/21 12:32 PM   Result Value Ref Range    Sodium 140 136 - 145 mmol/L    Potassium 3.6 3.5 - 5.1 mmol/L    Chloride 108 (H) 98 - 107 mmol/L    CO2 25 21 - 32 mmol/L    Anion gap 7 7 - 16 mmol/L    Glucose 103 (H) 65 - 100 mg/dL    BUN 16 6 - 23 MG/DL    Creatinine 0.67 0.6 - 1.0 MG/DL    GFR est AA >60 >60 ml/min/1.73m2    GFR est non-AA >60 >60 ml/min/1.73m2    Calcium 9.0 8.3 - 10.4 MG/DL    Bilirubin, total 0.3 0.2 - 1.1 MG/DL    ALT (SGPT) 46 12 - 65 U/L    AST (SGOT) 39 (H) 15 - 37 U/L    Alk. phosphatase 102 50 - 136 U/L    Protein, total 7.2 6.3 - 8.2 g/dL    Albumin 3.3 (L) 3.5 - 5.0 g/dL    Globulin 3.9 (H) 2.3 - 3.5 g/dL    A-G Ratio 0.8 (L) 1.2 - 3.5     MAGNESIUM    Collection Time: 05/02/21 12:32 PM   Result Value Ref Range    Magnesium 2.4 1.8 - 2.4 mg/dL   TROPONIN-HIGH SENSITIVITY    Collection Time: 05/02/21 12:32 PM   Result Value Ref Range    Troponin-High Sensitivity 8.1 0 - 14 pg/mL     Voice dictation software was used during the making of this note. This software is not perfect and grammatical and other typographical errors may be present. This note has been proofread, but may still contain errors.   Rafa Baires MD; 5/2/2021 @3:18 PM   ===================================================================

## 2021-05-02 NOTE — ED TRIAGE NOTES
Pt ambulatory to triage. Pt states COVID s/sx began on 04/24 and positive.  Pt states she has cough, fever, SOB, HA, chest tightness - midsternal.

## 2021-05-02 NOTE — TELEPHONE ENCOUNTER
Reason for Disposition   MODERATE difficulty breathing (e.g., speaks in phrases, SOB even at rest, pulse 100-120)    Answer Assessment - Initial Assessment Questions  1. COVID-19 DIAGNOSIS: \"Who made your Coronavirus (COVID-19) diagnosis? \" \"Was it confirmed by a positive lab test?\" If not diagnosed by a HCP, ask \"Are there lots of cases (community spread) where you live? \" (See public health department website, if unsure)      covid positive    2. COVID-19 EXPOSURE: \"Was there any known exposure to COVID before the symptoms began? \" [facility] Definition of close contact: within 6 feet (2 meters) for a total of 15 minutes or more over a 24-hour period. Unsure    3. ONSET: \"When did the COVID-19 symptoms start? \"       10 days ago    4. WORST SYMPTOM: \"What is your worst symptom? \" (e.g., cough, fever, shortness of breath, muscle aches)      Sob fatigue    5. COUGH: \"Do you have a cough? \" If so, ask: \"How bad is the cough? \"        Dry cough    6. FEVER: \"Do you have a fever? \" If so, ask: \"What is your temperature, how was it measured, and when did it start? \"      Yes    7. RESPIRATORY STATUS: \"Describe your breathing? \" (e.g., shortness of breath, wheezing, unable to speak)       Sob a even at rest    8. BETTER-SAME-WORSE: [place] you getting better, staying the same or getting worse compared to yesterday? \"  If getting worse, ask, \"In what way? \"      Worse    9. HIGH RISK DISEASE: \"Do you have any chronic medical problems? \" (e.g., asthma, heart or lung disease, weak immune system, obesity, etc.)        10. PREGNANCY: \"Is there any chance you are pregnant? \" \"When was your last menstrual period? \"       N/a    11. OTHER SYMPTOMS: \"Do you have any other symptoms? \"  (e.g., chills, fatigue, headache, loss of smell or taste, muscle pain, sore throat; new loss of smell or taste especially support the diagnosis of COVID-19)        Fatigue, sob ,fever, cp dry cough    Protocols used: CORONAVIRUS (COVID-19) DIAGNOSED OR [facility]    Call received on [facility] 26 hotline. Brief description of triage: as above    Triage indicates for patient to go to er    Care advice provided. Recommended using local department of health website for testing locations and up to date information. Caller verbalizes understanding, denies any other questions or concerns; instructed to call back for any new or worsening symptoms.

## 2021-05-03 LAB
ALBUMIN SERPL-MCNC: 3 G/DL (ref 3.5–5)
ALBUMIN/GLOB SERPL: 0.8 {RATIO} (ref 1.2–3.5)
ALP SERPL-CCNC: 104 U/L (ref 50–136)
ALT SERPL-CCNC: 56 U/L (ref 12–65)
ANION GAP SERPL CALC-SCNC: 11 MMOL/L (ref 7–16)
AST SERPL-CCNC: 48 U/L (ref 15–37)
ATRIAL RATE: 62 BPM
BASOPHILS # BLD: 0 K/UL (ref 0–0.2)
BASOPHILS NFR BLD: 0 % (ref 0–2)
BILIRUB SERPL-MCNC: 0.2 MG/DL (ref 0.2–1.1)
BLD PROD TYP BPU: NORMAL
BLOOD BANK CMNT PATIENT-IMP: NORMAL
BPU ID: NORMAL
BUN SERPL-MCNC: 15 MG/DL (ref 6–23)
CALCIUM SERPL-MCNC: 8.6 MG/DL (ref 8.3–10.4)
CALCULATED P AXIS, ECG09: 65 DEGREES
CALCULATED R AXIS, ECG10: -19 DEGREES
CALCULATED T AXIS, ECG11: 72 DEGREES
CHLORIDE SERPL-SCNC: 109 MMOL/L (ref 98–107)
CO2 SERPL-SCNC: 23 MMOL/L (ref 21–32)
CREAT SERPL-MCNC: 0.68 MG/DL (ref 0.6–1)
D DIMER PPP FEU-MCNC: 0.77 UG/ML(FEU)
DIAGNOSIS, 93000: NORMAL
DIFFERENTIAL METHOD BLD: ABNORMAL
EOSINOPHIL # BLD: 0 K/UL (ref 0–0.8)
EOSINOPHIL NFR BLD: 0 % (ref 0.5–7.8)
ERYTHROCYTE [DISTWIDTH] IN BLOOD BY AUTOMATED COUNT: 13.9 % (ref 11.9–14.6)
GLOBULIN SER CALC-MCNC: 4 G/DL (ref 2.3–3.5)
GLUCOSE SERPL-MCNC: 151 MG/DL (ref 65–100)
HCT VFR BLD AUTO: 34.5 % (ref 35.8–46.3)
HGB BLD-MCNC: 11.3 G/DL (ref 11.7–15.4)
IMM GRANULOCYTES # BLD AUTO: 0 K/UL (ref 0–0.5)
IMM GRANULOCYTES NFR BLD AUTO: 1 % (ref 0–5)
LYMPHOCYTES # BLD: 0.7 K/UL (ref 0.5–4.6)
LYMPHOCYTES NFR BLD: 21 % (ref 13–44)
MCH RBC QN AUTO: 31.2 PG (ref 26.1–32.9)
MCHC RBC AUTO-ENTMCNC: 32.8 G/DL (ref 31.4–35)
MCV RBC AUTO: 95.3 FL (ref 79.6–97.8)
MONOCYTES # BLD: 0.3 K/UL (ref 0.1–1.3)
MONOCYTES NFR BLD: 10 % (ref 4–12)
NEUTS SEG # BLD: 2.1 K/UL (ref 1.7–8.2)
NEUTS SEG NFR BLD: 68 % (ref 43–78)
NRBC # BLD: 0 K/UL (ref 0–0.2)
P-R INTERVAL, ECG05: 140 MS
PLATELET # BLD AUTO: 238 K/UL (ref 150–450)
PLATELET COMMENTS,PCOM: ADEQUATE
PMV BLD AUTO: 10.7 FL (ref 9.4–12.3)
POTASSIUM SERPL-SCNC: 3.8 MMOL/L (ref 3.5–5.1)
PROT SERPL-MCNC: 7 G/DL (ref 6.3–8.2)
Q-T INTERVAL, ECG07: 388 MS
QRS DURATION, ECG06: 80 MS
QTC CALCULATION (BEZET), ECG08: 393 MS
RBC # BLD AUTO: 3.62 M/UL (ref 4.05–5.2)
RBC MORPH BLD: ABNORMAL
SODIUM SERPL-SCNC: 143 MMOL/L (ref 136–145)
STATUS OF UNIT,%ST: NORMAL
UNIT DIVISION, %UDIV: NORMAL
VENTRICULAR RATE, ECG03: 62 BPM
WBC # BLD AUTO: 3.1 K/UL (ref 4.3–11.1)
WBC MORPH BLD: ABNORMAL

## 2021-05-03 PROCEDURE — 74011000250 HC RX REV CODE- 250: Performed by: INTERNAL MEDICINE

## 2021-05-03 PROCEDURE — XW033E5 INTRODUCTION OF REMDESIVIR ANTI-INFECTIVE INTO PERIPHERAL VEIN, PERCUTANEOUS APPROACH, NEW TECHNOLOGY GROUP 5: ICD-10-PCS | Performed by: INTERNAL MEDICINE

## 2021-05-03 PROCEDURE — 74011250637 HC RX REV CODE- 250/637: Performed by: INTERNAL MEDICINE

## 2021-05-03 PROCEDURE — 80053 COMPREHEN METABOLIC PANEL: CPT

## 2021-05-03 PROCEDURE — 74011250636 HC RX REV CODE- 250/636: Performed by: INTERNAL MEDICINE

## 2021-05-03 PROCEDURE — 85025 COMPLETE CBC W/AUTO DIFF WBC: CPT

## 2021-05-03 PROCEDURE — 65270000015 HC RM PRIVATE ONCOLOGY

## 2021-05-03 PROCEDURE — 74011000258 HC RX REV CODE- 258: Performed by: INTERNAL MEDICINE

## 2021-05-03 PROCEDURE — 85379 FIBRIN DEGRADATION QUANT: CPT

## 2021-05-03 RX ADMIN — REMDESIVIR 200 MG: 100 INJECTION, POWDER, LYOPHILIZED, FOR SOLUTION INTRAVENOUS at 10:10

## 2021-05-03 RX ADMIN — ENOXAPARIN SODIUM 30 MG: 30 INJECTION SUBCUTANEOUS at 21:19

## 2021-05-03 RX ADMIN — Medication 10 ML: at 21:33

## 2021-05-03 RX ADMIN — DEXAMETHASONE 6 MG: 4 TABLET ORAL at 08:34

## 2021-05-03 RX ADMIN — CHOLECALCIFEROL TAB 125 MCG (5000 UNIT) 6000 UNITS: 125 TAB at 10:09

## 2021-05-03 RX ADMIN — Medication 10 ML: at 14:08

## 2021-05-03 RX ADMIN — AZITHROMYCIN MONOHYDRATE 500 MG: 250 TABLET ORAL at 08:34

## 2021-05-03 RX ADMIN — ENOXAPARIN SODIUM 30 MG: 30 INJECTION SUBCUTANEOUS at 08:34

## 2021-05-03 NOTE — PROGRESS NOTES
05/03/21 1307   Dual Skin Pressure Injury Assessment   Dual Skin Pressure Injury Assessment WDL   Second Care Provider (Based on [address] [place] [address]) FARRUKH Vann   Skin Integumentary   Skin Integumentary (WDL) WDL    Pressure  Injury Documentation No Pressure Injury Noted-Pressure Ulcer Prevention Initiated

## 2021-05-03 NOTE — PROGRESS NOTES
Problem: Risk for Spread of Infection  Goal: Prevent transmission of infectious organism to others  Description: Prevent the transmission of infectious organisms to other patients, staff members, and visitors. Outcome: Progressing Towards Goal     Problem: Patient Education:  Go to Education Activity  Goal: Patient/Family Education  Outcome: Progressing Towards Goal     Problem: Gas Exchange - Impaired  Goal: Absence of hypoxia  Outcome: Progressing Towards Goal  Goal: Promote optimal lung function  Outcome: Progressing Towards Goal     Problem: Breathing Pattern - Ineffective  Goal: Ability to achieve and maintain a regular respiratory rate  Outcome: Progressing Towards Goal     Problem:  Body Temperature -  Risk of, Imbalanced  Goal: Ability to maintain a body temperature within defined limits  Outcome: Progressing Towards Goal  Goal: Will regain or maintain usual level of consciousness  Outcome: Progressing Towards Goal  Goal: Complications related to the disease process, condition or treatment will be avoided or minimized  Outcome: Progressing Towards Goal     Problem: Nutrition Deficits  Goal: Optimize nutrtional status  Outcome: Progressing Towards Goal     Problem: Risk for Fluid Volume Deficit  Goal: Maintain normal heart rhythm  Outcome: Progressing Towards Goal  Goal: Maintain absence of muscle cramping  Outcome: Progressing Towards Goal  Goal: Maintain normal serum potassium, sodium, calcium, phosphorus, and pH  Outcome: Progressing Towards Goal     Problem: Loneliness or Risk for Loneliness  Goal: Demonstrate positive use of time alone when socialization is not possible  Outcome: Progressing Towards Goal     Problem: Fatigue  Goal: Verbalize increase energy and improved vitality  Outcome: Progressing Towards Goal     Problem: Patient Education: Go to Patient Education Activity  Goal: Patient/Family Education  Outcome: Progressing Towards Goal     Pt in no distress, no C/O pain/N/V/diarrhea, tolerating meds/diet per ED RN, pt oxygen assessed, IV intact, POC/safety/culture of unit discussed with pt, pt states she comprehends.

## 2021-05-03 NOTE — PROGRESS NOTES
Initial visit made to patient and a prayer was provided. She had precautions on her so I did not enter the room.       Tree Quigley, [address] [facility], [facility]

## 2021-05-03 NOTE — PROGRESS NOTES
TRANSFER - IN REPORT:    Verbal report received from FARRUKH Graves on Burnetta Maxime  being received from ER for change in patient condition(oxygen requirement, COVID-19 +)      Report consisted of patients Situation, Background, Assessment and   Recommendations(SBAR). Information from the following report(s) SBAR was reviewed with the receiving nurse. Opportunity for questions and clarification was provided. Assessment completed upon patients arrival to unit and care assumed.

## 2021-05-03 NOTE — PROGRESS NOTES
Reviewed notes for spiritual concerns      Noted:    Preferred name:  JYOTI    Full code    Lives locally    Chaplains have followed 2020      Will continue to assess how we can best serve this family

## 2021-05-03 NOTE — PROGRESS NOTES
[facility]ist Progress Note     Name:  Angi Hull  Age:60 y.o. Sex:female   :  1960    MRN:  [id number]     Admit Date:  2021    Reason for Admission:  Pneumonia due to COVID-19 virus [U07.1, J12.82]    Assessment & Plan     Covid pneumonia  - currently requiring 2L NC  - tested postivie on   - agreeable to offered treatments of               Decadrong 6mg daily              Conv plasma                 Remdesivir  - symptomatic mgmt with cough suppressants, prn duonebs  - lovenox 30mg sq q12  - empiric antbx Rocephin/Azithromy   5/3/21 Patient has received Convalescent plasma; Remdesivir also started today. Patient now on 4L. Continue O2 and wean as tolerated. Patient is not home O2 dependent. Procal < 0.05. Will continue Azithromycin only  5/3/21 D dimer elevated at 0.77 but feel this is 2/2 inflammatory response from COVID. Will consider further imaging if needed. Patient is not tachypneic or tachycardic     Migraine Headaches  5/3/21 Asymptomatic at this time; Treat prn    Diet:  DIET REGULAR  DVT PPx: Lovenox SQ  GI Ppx: None  Code: Full Code    Dispo/Discharge Planning: 3-4 days    Hospital Course/Subjective:   Angi Hull is a 62 y.o. female with medical history of migraine HA presented to ER with complaint of 10 days of dyspnea with associated intermittent dry cough, fever and chills, mild nausea without vomiting and loss of taste/smell. Patient reports she did tet COVID positive x 1 week ago on Saturday and also reports that her  is also COVID positive but not requiring hospitalization at this time. Denies receiving COVID vaccine        Subjective/24 hr Events (21):  5/3/21 Patient reports DAVIS and poor appetite. No other significant events since admit    Review of Systems:   Review of Systems   Constitutional: Positive for malaise/fatigue. Respiratory: Positive for shortness of breath.       14 point review of systems is otherwise negative with the exception of the elements mentioned above.     Objective:     Patient Vitals for the past 24 hrs:   Temp Pulse Resp BP SpO2   05/03/21 1300     92 %   05/03/21 1239 97.9 °F (36.6 °C) 67 20 128/68 (!) 89 %   05/03/21 1149 99.2 °F (37.3 °C) 66 16 (!) 110/55 94 %   05/03/21 0933  68   98 %   05/03/21 0905  72   98 %   05/03/21 0820  (!) 57  130/64 95 %   05/03/21 0800  60  130/71 95 %   05/03/21 0740  71  (!) 143/69 (!) 88 %   05/03/21 0720  60  (!) 141/69 92 %   05/03/21 0700  (!) 59  128/64 92 %   05/03/21 0640  60  136/68 91 %   05/03/21 0620  (!) 57  123/69 92 %   05/03/21 0600  (!) 56  135/67 90 %   05/03/21 0540  (!) 52  127/64 91 %   05/03/21 0520  (!) 58  128/60 90 %   05/03/21 0500  (!) 55  127/60 91 %   05/03/21 0440  68  (!) 155/74 91 %   05/03/21 0420  (!) 57  (!) 103/59 92 %   05/03/21 0400  (!) 57  125/71 93 %   05/03/21 0340  62  134/71 92 %   05/03/21 0320  64  121/63 92 %   05/03/21 0240  (!) 51  (!) 106/58 92 %   05/03/21 0220  (!) 53  (!) 111/58 91 %   05/03/21 0204  (!) 56  131/63 92 %   05/03/21 0121  68  133/65 94 %   05/03/21 0100  (!) 54  134/65 92 %   05/03/21 0040  (!) 52  (!) 122/58 94 %   05/03/21 0020  (!) 53  132/65 94 %   05/03/21 0000  (!) 55  (!) 129/59 94 %   05/02/21 2339  (!) 53  (!) 141/72 94 %   05/02/21 2329  (!) 59  138/75 95 %   05/02/21 2325  (!) 58  137/73 95 %   05/02/21 2320 99.1 °F (37.3 °C) (!) 58  133/70 95 %   05/02/21 2314  60  (!) 145/75 94 %   05/02/21 2313  (!) 59  (!) 148/70 95 %   05/02/21 2310  63  (!) 162/76 94 %   05/02/21 2300 99.2 °F (37.3 °C) (!) 57  129/68 94 %   05/02/21 2230  (!) 53  120/63 95 %   05/02/21 2030  (!) 58  118/61 95 %   05/02/21 2015     97 %   05/02/21 2000  62  123/64 98 %   05/02/21 1953  63  120/60 97 %   05/02/21 1946  68  117/62 92 %   05/02/21 1800  (!) 53  117/62 95 %   05/02/21 1600  60  135/75 97 %   05/02/21 1540  61   94 %   05/02/21 1530  71  (!) 142/69 96 %   05/02/21 1500  (!) 59  118/62 97 %     Oxygen Therapy  O2 Sat (%): 92 % (05/03/21 1300)  Pulse via Oximetry: 66 beats per minute (05/03/21 1149)  O2 Device: Nasal cannula (05/03/21 1307)  Skin Assessment: Clean, dry, & intact (05/03/21 1307)  Skin Protection for O2 Device: No (05/03/21 1307)  O2 Flow Rate (L/min): 4 l/min (05/03/21 1307)    Body mass index is 27.44 kg/m².     Physical Exam:   General: No acute distress, speaking in full sentences, no use of accessory muscles   HEENT: Pupils equal;  oropharynx is clear   Neck: Supple, no lymphadenopathy, no JVD   Lungs: Clear to auscultation bilaterally; good air flow  Cardiovascular: Regular rate and rhythm with normal S1 and S2   Abdomen: Soft, nontender, nondistended, normoactive bowel sounds   Extremities: No cyanosis clubbing or edema   Neuro: Nonfocal, A&O x3   Psych: Normal affect     Data Review:  I have reviewed all labs, meds, and studies from the last 24 hours:    Labs:    Recent Results (from the past 24 hour(s))   CONVALESCENT PLASMA, ALLOCATE    Collection Time: 05/02/21  7:30 PM   Result Value Ref Range    Comment CALLED ER @2228 THAT UNIT WAS READY [place]     Unit number [id number]     Blood component type ConvPls,Th     Unit division B0     Status of unit TRANSFUSED    TYPE & SCREEN    Collection Time: 05/02/21  7:52 PM   Result Value Ref Range    Crossmatch Expiration 05/05/2021,2359     ABO/Rh(D) A POSITIVE     Antibody screen NEG    METABOLIC PANEL, COMPREHENSIVE    Collection Time: 05/02/21  8:01 PM   Result Value Ref Range    Sodium 143 136 - 145 mmol/L    Potassium 3.5 3.5 - 5.1 mmol/L    Chloride 112 (H) 98 - 107 mmol/L    CO2 22 21 - 32 mmol/L    Anion gap 9 7 - 16 mmol/L    Glucose 151 (H) 65 - 100 mg/dL    BUN 14 6 - 23 MG/DL    Creatinine 0.57 (L) 0.6 - 1.0 MG/DL    GFR est AA >60 >60 ml/min/1.73m2    GFR est non-AA >60 >60 ml/min/1.73m2    Calcium 8.0 (L) 8.3 - 10.4 MG/DL    Bilirubin, total 0.2 0.2 - 1.1 MG/DL    ALT (SGPT) 45 12 - 65 U/L    AST (SGOT) 43 (H) 15 - 37 U/L    Alk. phosphatase 95 50 - 136 U/L    Protein, total 6.7 6.3 - 8.2 g/dL    Albumin 2.8 (L) 3.5 - 5.0 g/dL    Globulin 3.9 (H) 2.3 - 3.5 g/dL    A-G Ratio 0.7 (L) 1.2 - 3.5     PROCALCITONIN    Collection Time: 05/02/21  8:01 PM   Result Value Ref Range    Procalcitonin <0.05 ng/mL   D DIMER    Collection Time: 05/02/21  8:01 PM   Result Value Ref Range    D DIMER 0.50 <0.56 ug/ml(FEU)   D DIMER    Collection Time: 05/03/21  4:37 AM   Result Value Ref Range    D DIMER 0.77 (H) <0.56 ug/ml(FEU)   METABOLIC PANEL, COMPREHENSIVE    Collection Time: 05/03/21  4:37 AM   Result Value Ref Range    Sodium 143 136 - 145 mmol/L    Potassium 3.8 3.5 - 5.1 mmol/L    Chloride 109 (H) 98 - 107 mmol/L    CO2 23 21 - 32 mmol/L    Anion gap 11 7 - 16 mmol/L    Glucose 151 (H) 65 - 100 mg/dL    BUN 15 6 - 23 MG/DL    Creatinine 0.68 0.6 - 1.0 MG/DL    GFR est AA >60 >60 ml/min/1.73m2    GFR est non-AA >60 >60 ml/min/1.73m2    Calcium 8.6 8.3 - 10.4 MG/DL    Bilirubin, total 0.2 0.2 - 1.1 MG/DL    ALT (SGPT) 56 12 - 65 U/L    AST (SGOT) 48 (H) 15 - 37 U/L    Alk. phosphatase 104 50 - 136 U/L    Protein, total 7.0 6.3 - 8.2 g/dL    Albumin 3.0 (L) 3.5 - 5.0 g/dL    Globulin 4.0 (H) 2.3 - 3.5 g/dL    A-G Ratio 0.8 (L) 1.2 - 3.5     CBC WITH AUTOMATED DIFF    Collection Time: 05/03/21  4:37 AM   Result Value Ref Range    WBC 3.1 (L) 4.3 - 11.1 K/uL    RBC 3.62 (L) 4.05 - 5.2 M/uL    HGB 11.3 (L) 11.7 - 15.4 g/dL    HCT 34.5 (L) 35.8 - 46.3 %    MCV 95.3 79.6 - 97.8 FL    MCH 31.2 26.1 - 32.9 PG    MCHC 32.8 31.4 - 35.0 g/dL    RDW 13.9 11.9 - 14.6 %    PLATELET 199 279 - 811 K/uL    MPV 10.7 9.4 - 12.3 FL    ABSOLUTE NRBC 0.00 0.0 - 0.2 K/uL    NEUTROPHILS 68 43 - 78 %    LYMPHOCYTES 21 13 - 44 %    MONOCYTES 10 4.0 - 12.0 %    EOSINOPHILS 0 (L) 0.5 - 7.8 %    BASOPHILS 0 0.0 - 2.0 %    IMMATURE GRANULOCYTES 1 0.0 - 5.0 %    ABS. NEUTROPHILS 2.1 1.7 - 8.2 K/UL    ABS.  LYMPHOCYTES 0.7 0.5 - 4.6 K/UL    ABS. MONOCYTES 0.3 0.1 - 1.3 K/UL    ABS. EOSINOPHILS 0.0 0.0 - 0.8 K/UL    ABS. BASOPHILS 0.0 0.0 - 0.2 K/UL    ABS. IMM. GRANS. 0.0 0.0 - 0.5 K/UL    RBC COMMENTS NORMOCYTIC/NORMOCHROMIC      WBC COMMENTS OCCASIONAL      PLATELET COMMENTS ADEQUATE      DF AUTOMATED         All Micro Results     None          EKG Results     Procedure 720 Value Units Date/Time    EKG [[id number]] Collected: 05/02/21 1229    Order Status: Completed Updated: 05/03/21 0005     Ventricular Rate 62 BPM      Atrial Rate 62 BPM      P-R Interval 140 ms      QRS Duration 80 ms      Q-T Interval 388 ms      QTC Calculation (Bezet) 393 ms      Calculated P Axis 65 degrees      Calculated R Axis -19 degrees      Calculated T Axis 72 degrees      Diagnosis --     Normal sinus rhythm  Normal ECG  When compared with ECG of 29-APR-2021 10:52,  Vent. rate has decreased BY  32 BPM  QRS axis Shifted right  Confirmed by Elia Allison MD, Kami Sanchez ([id number]) on 5/3/2021 12:04:51 AM            Other Studies:  Xr Chest Port    Result Date: 5/2/2021  EXAM: XR CHEST PORT INDICATION: Syncope COMPARISON: 4/29/2021 FINDINGS: A portable AP radiograph of the chest was obtained at 1236 hours. The patient is on a cardiac monitor. Bibasilar increased interstitial markings unchanged. The cardiac and mediastinal contours and pulmonary vascularity are normal.  The bones and soft tissues are grossly within normal limits. Bilateral Covid pneumonia unchanged.          Current Meds:   Current Facility-Administered Medications   Medication Dose Route Frequency    enoxaparin (LOVENOX) injection 30 mg  30 mg SubCUTAneous Q12H    acetaminophen (TYLENOL) tablet 650 mg  650 mg Oral Q6H PRN    Or    acetaminophen (TYLENOL) suppository 650 mg  650 mg Rectal Q6H PRN    guaiFENesin-dextromethorphan (ROBITUSSIN DM) 100-10 mg/5 mL syrup 5 mL  5 mL Oral Q4H PRN    dexAMETHasone (DECADRON) tablet 6 mg  6 mg Oral DAILY    cholecalciferol (VITAMIN D3) tablet 6,000 Units  6,000 Units Oral DAILY    Followed by   Roderick Osborne ON 5/10/2021] cholecalciferol (VITAMIN D3) (1000 Units /25 mcg) tablet 2,000 Units  2,000 Units Oral DAILY    sodium chloride (NS) flush 5-40 mL  5-40 mL IntraVENous Q8H    sodium chloride (NS) flush 5-40 mL  5-40 mL IntraVENous PRN    polyethylene glycol (MIRALAX) packet 17 g  17 g Oral DAILY PRN    promethazine (PHENERGAN) tablet 12.5 mg  12.5 mg Oral Q6H PRN    Or    ondansetron (ZOFRAN) injection 4 mg  4 mg IntraVENous Q6H PRN    [START ON 5/4/2021] remdesivir 100 mg in 0.9% sodium chloride 250 mL IVPB  100 mg IntraVENous Q24H    0.9% sodium chloride infusion 250 mL  250 mL IntraVENous PRN    albuterol-ipratropium (DUO-NEB) 2.5 MG-0.5 MG/3 ML  3 mL Nebulization Q4H PRN    cefTRIAXone (ROCEPHIN) 2 g in 0.9% sodium chloride (MBP/ADV) 50 mL MBP  2 g IntraVENous Q24H    azithromycin (ZITHROMAX) tablet 500 mg  500 mg Oral DAILY       Problem List:  Hospital Problems as of 5/3/2021 Date Reviewed: 2/6/2020          Codes Class Noted - Resolved POA    Pneumonia due to COVID-19 virus ICD-10-CM: U07.1, J12.82  ICD-9-CM: 480.8, 079.89  5/2/2021 - Present Unknown        SNHL (sensorineural hearing loss) ICD-10-CM: H90.5  ICD-9-CM: 389.10  Unknown - Present Yes        Allergic rhinitis ICD-10-CM: J30.9  ICD-9-CM: 477.9  3/7/2013 - Present Yes               Part of this note was written by using a voice dictation software and the note has been proof read but may still contain some grammatical/other typographical errors.     Signed By: Zach Sandoval NP   [facility] Hospitalist Service    May 3, 2021  2:54 PM

## 2021-05-04 LAB
ALBUMIN SERPL-MCNC: 2.7 G/DL (ref 3.5–5)
ALBUMIN/GLOB SERPL: 0.7 {RATIO} (ref 1.2–3.5)
ALP SERPL-CCNC: 104 U/L (ref 50–136)
ALT SERPL-CCNC: 79 U/L (ref 12–65)
ANION GAP SERPL CALC-SCNC: 10 MMOL/L (ref 7–16)
AST SERPL-CCNC: 54 U/L (ref 15–37)
BACTERIA SPEC CULT: NORMAL
BILIRUB SERPL-MCNC: 0.3 MG/DL (ref 0.2–1.1)
BUN SERPL-MCNC: 17 MG/DL (ref 6–23)
CALCIUM SERPL-MCNC: 8.7 MG/DL (ref 8.3–10.4)
CHLORIDE SERPL-SCNC: 110 MMOL/L (ref 98–107)
CO2 SERPL-SCNC: 24 MMOL/L (ref 21–32)
CREAT SERPL-MCNC: 0.54 MG/DL (ref 0.6–1)
D DIMER PPP FEU-MCNC: 0.64 UG/ML(FEU)
GLOBULIN SER CALC-MCNC: 4 G/DL (ref 2.3–3.5)
GLUCOSE SERPL-MCNC: 87 MG/DL (ref 65–100)
POTASSIUM SERPL-SCNC: 3.5 MMOL/L (ref 3.5–5.1)
PROT SERPL-MCNC: 6.7 G/DL (ref 6.3–8.2)
SERVICE CMNT-IMP: NORMAL
SODIUM SERPL-SCNC: 144 MMOL/L (ref 136–145)

## 2021-05-04 PROCEDURE — 94760 N-INVAS EAR/PLS OXIMETRY 1: CPT

## 2021-05-04 PROCEDURE — 77010033678 HC OXYGEN DAILY

## 2021-05-04 PROCEDURE — 80053 COMPREHEN METABOLIC PANEL: CPT

## 2021-05-04 PROCEDURE — 74011250636 HC RX REV CODE- 250/636: Performed by: INTERNAL MEDICINE

## 2021-05-04 PROCEDURE — 65270000015 HC RM PRIVATE ONCOLOGY

## 2021-05-04 PROCEDURE — 74011000258 HC RX REV CODE- 258: Performed by: INTERNAL MEDICINE

## 2021-05-04 PROCEDURE — 85379 FIBRIN DEGRADATION QUANT: CPT

## 2021-05-04 PROCEDURE — 74011000250 HC RX REV CODE- 250: Performed by: INTERNAL MEDICINE

## 2021-05-04 PROCEDURE — 74011250637 HC RX REV CODE- 250/637: Performed by: INTERNAL MEDICINE

## 2021-05-04 RX ORDER — HYDROCODONE BITARTRATE AND HOMATROPINE METHYLBROMIDE 1.5; 5 MG/5ML; MG/5ML
5 SYRUP ORAL
Status: DISCONTINUED | OUTPATIENT
Start: 2021-05-04 | End: 2021-05-06 | Stop reason: HOSPADM

## 2021-05-04 RX ORDER — ALBUTEROL SULFATE 90 UG/1
1 AEROSOL, METERED RESPIRATORY (INHALATION)
Status: DISCONTINUED | OUTPATIENT
Start: 2021-05-04 | End: 2021-05-05

## 2021-05-04 RX ADMIN — ACETAMINOPHEN 650 MG: 325 TABLET, FILM COATED ORAL at 20:28

## 2021-05-04 RX ADMIN — Medication 10 ML: at 05:37

## 2021-05-04 RX ADMIN — Medication 10 ML: at 21:43

## 2021-05-04 RX ADMIN — CHOLECALCIFEROL TAB 125 MCG (5000 UNIT) 6000 UNITS: 125 TAB at 08:21

## 2021-05-04 RX ADMIN — Medication 10 ML: at 13:21

## 2021-05-04 RX ADMIN — AZITHROMYCIN MONOHYDRATE 500 MG: 250 TABLET ORAL at 08:21

## 2021-05-04 RX ADMIN — ENOXAPARIN SODIUM 30 MG: 30 INJECTION SUBCUTANEOUS at 08:20

## 2021-05-04 RX ADMIN — REMDESIVIR 100 MG: 100 INJECTION, POWDER, LYOPHILIZED, FOR SOLUTION INTRAVENOUS at 11:34

## 2021-05-04 RX ADMIN — ENOXAPARIN SODIUM 30 MG: 30 INJECTION SUBCUTANEOUS at 20:28

## 2021-05-04 RX ADMIN — DEXAMETHASONE 6 MG: 4 TABLET ORAL at 08:21

## 2021-05-04 NOTE — PROGRESS NOTES
[facility] Progress Note     Name:  Tae Alejandra  Age:60 y.o. Sex:female   :  1960    MRN:  [id number]     Admit Date:  2021    Reason for Admission:  Pneumonia due to COVID-19 virus [U07.1, J12.82]    Assessment & Plan   Covid pneumonia  - currently requiring 2L NC  - tested postivie on   - agreeable to offered treatments of               Decadrong 6mg daily              Conv plasma                 Remdesivir  - symptomatic mgmt with cough suppressants, prn duonebs  - lovenox 30mg sq q12  - empiric antbx Rocephin/Azithromy   5/3/21 Patient has received Convalescent plasma; Remdesivir also started today. Patient now on 4L. Continue O2 and wean as tolerated. Patient is not home O2 dependent. Procal < 0.05. Will continue Azithromycin only  5/3/21 D dimer elevated at 0.77 but feel this is 2/2 inflammatory response from COVID. Will consider further imaging if needed. Patient is not tachypneic or tachycardic   21 Patient on 4L and continuing Remdesivir. Will discontinue ABx. Believe symptoms are Covid related only  Patient afebrile with white count of 3.1. Continue steroids, nebs, IS    Migraine Headaches  5/3/21 Asymptomatic at this time; Treat prn             Diet:  DIET REGULAR  DVT PPx: Lovenox SQ  GI Ppx: None  Code: Full Code    Dispo/Discharge Planning: 3-4 days    Hospital Course/Subjective:   Eddie Dunham a 62 y.o. female with medical history of migraine HA presented to ER with complaint of 10 days of dyspnea with associated intermittent dry cough, fever and chills, mild nausea without vomiting and loss of taste/smell. Patient reports she did tet COVID positive x 1 week ago on Saturday and also reports that her  is also COVID positive but not requiring hospitalization at this time. Denies receiving COVID vaccine         Subjective/24 hr Events (21):  5/3/21 Patient reports DAVIS and poor appetite.  No other significant events since admit   21 Patient reports return of smell and taste    Review of Systems:  Review of Systems   Constitutional: Positive for malaise/fatigue. Poor appetite   Respiratory: Positive for shortness of breath. 14 point review of systems is otherwise negative with the exception of the elements mentioned above. Objective:     Patient Vitals for the past 24 hrs:   Temp Pulse Resp BP SpO2   05/04/21 0851 98.2 °F (36.8 °C) (!) 59 17 116/65 93 %   05/04/21 0344 98.6 °F (37 °C) 60 18 114/60 94 %   05/03/21 2333 98.6 °F (37 °C) 60 18 124/73 93 %   05/03/21 2036     94 %   05/03/21 1906 98.4 °F (36.9 °C) 60 18 121/71 93 %   05/03/21 1526 98 °F (36.7 °C) 71 18 138/71 94 %   05/03/21 1300     92 %   05/03/21 1239 97.9 °F (36.6 °C) 67 20 128/68 (!) 89 %   05/03/21 1149 99.2 °F (37.3 °C) 66 16 (!) 110/55 94 %     Oxygen Therapy  O2 Sat (%): 93 % (05/04/21 0851)  Pulse via Oximetry: 66 beats per minute (05/03/21 1149)  O2 Device: Nasal cannula (05/04/21 0821)  Skin Assessment: Clean, dry, & intact (05/04/21 4810)  Skin Protection for O2 Device: No (05/04/21 0821)  O2 Flow Rate (L/min): 4 l/min (05/04/21 0821)    Body mass index is 27.44 kg/m².     Physical Exam:   General: No acute distress, speaking in full sentences, no use of accessory muscles   HEENT: Pupils equal, oropharynx is clear   Neck: no JVD   Lungs: Clear to auscultation bilaterally   Cardiovascular: Regular rate and rhythm with normal S1 and S2   Abdomen: Soft, nontender, nondistended, normoactive bowel sounds   Extremities: No cyanosis clubbing or edema   Neuro: Nonfocal, A&O x3   Psych: Normal affect     Data Review:  I have reviewed all labs, meds, and studies from the last 24 hours:    Labs:    Recent Results (from the past 24 hour(s))   METABOLIC PANEL, COMPREHENSIVE    Collection Time: 05/04/21  4:00 AM   Result Value Ref Range    Sodium 144 136 - 145 mmol/L    Potassium 3.5 3.5 - 5.1 mmol/L    Chloride 110 (H) 98 - 107 mmol/L    CO2 24 21 - 32 mmol/L    Anion gap 10 7 - 16 mmol/L    Glucose 87 65 - 100 mg/dL    BUN 17 6 - 23 MG/DL    Creatinine 0.54 (L) 0.6 - 1.0 MG/DL    GFR est AA >60 >60 ml/min/1.73m2    GFR est non-AA >60 >60 ml/min/1.73m2    Calcium 8.7 8.3 - 10.4 MG/DL    Bilirubin, total 0.3 0.2 - 1.1 MG/DL    ALT (SGPT) 79 (H) 12 - 65 U/L    AST (SGOT) 54 (H) 15 - 37 U/L    Alk. phosphatase 104 50 - 136 U/L    Protein, total 6.7 6.3 - 8.2 g/dL    Albumin 2.7 (L) 3.5 - 5.0 g/dL    Globulin 4.0 (H) 2.3 - 3.5 g/dL    A-G Ratio 0.7 (L) 1.2 - 3.5     D DIMER    Collection Time: 05/04/21  4:00 AM   Result Value Ref Range    D DIMER 0.64 (H) <0.56 ug/ml(FEU)       All Micro Results     None          EKG Results     Procedure 720 Value Units Date/Time    EKG [[id number]] Collected: 05/02/21 1229    Order Status: Completed Updated: 05/03/21 0005     Ventricular Rate 62 BPM      Atrial Rate 62 BPM      P-R Interval 140 ms      QRS Duration 80 ms      Q-T Interval 388 ms      QTC Calculation (Bezet) 393 ms      Calculated P Axis 65 degrees      Calculated R Axis -19 degrees      Calculated T Axis 72 degrees      Diagnosis --     Normal sinus rhythm  Normal ECG  When compared with ECG of 29-APR-2021 10:52,  Vent. rate has decreased BY  32 BPM  QRS axis Shifted right  Confirmed by Joe Lorenzo MD, Airam Escalante ([id number]) on 5/3/2021 12:04:51 AM            Other Studies:  Xr Chest Port    Result Date: 5/2/2021  EXAM: XR CHEST PORT INDICATION: Syncope COMPARISON: 4/29/2021 FINDINGS: A portable AP radiograph of the chest was obtained at 1236 hours. The patient is on a cardiac monitor. Bibasilar increased interstitial markings unchanged. The cardiac and mediastinal contours and pulmonary vascularity are normal.  The bones and soft tissues are grossly within normal limits. Bilateral Covid pneumonia unchanged.          Current Meds:   Current Facility-Administered Medications   Medication Dose Route Frequency    enoxaparin (LOVENOX) injection 30 mg  30 mg SubCUTAneous Q12H    acetaminophen (TYLENOL) tablet 650 mg  650 mg Oral Q6H PRN    Or    acetaminophen (TYLENOL) suppository 650 mg  650 mg Rectal Q6H PRN    guaiFENesin-dextromethorphan (ROBITUSSIN DM) 100-10 mg/5 mL syrup 5 mL  5 mL Oral Q4H PRN    dexAMETHasone (DECADRON) tablet 6 mg  6 mg Oral DAILY    cholecalciferol (VITAMIN D3) tablet 6,000 Units  6,000 Units Oral DAILY    Followed by   Radha Badillo ON 5/10/2021] cholecalciferol (VITAMIN D3) (1000 Units /25 mcg) tablet 2,000 Units  2,000 Units Oral DAILY    sodium chloride (NS) flush 5-40 mL  5-40 mL IntraVENous Q8H    sodium chloride (NS) flush 5-40 mL  5-40 mL IntraVENous PRN    polyethylene glycol (MIRALAX) packet 17 g  17 g Oral DAILY PRN    promethazine (PHENERGAN) tablet 12.5 mg  12.5 mg Oral Q6H PRN    Or    ondansetron (ZOFRAN) injection 4 mg  4 mg IntraVENous Q6H PRN    remdesivir 100 mg in 0.9% sodium chloride 250 mL IVPB  100 mg IntraVENous Q24H    0.9% sodium chloride infusion 250 mL  250 mL IntraVENous PRN    albuterol-ipratropium (DUO-NEB) 2.5 MG-0.5 MG/3 ML  3 mL Nebulization Q4H PRN    azithromycin (ZITHROMAX) tablet 500 mg  500 mg Oral DAILY       Problem List:  Hospital Problems as of 5/4/2021 Date Reviewed: 2/6/2020          Codes Class Noted - Resolved POA    * (Principal) Pneumonia due to COVID-19 virus ICD-10-CM: U07.1, J12.82  ICD-9-CM: 480.8, 079.89  5/2/2021 - Present Unknown        SNHL (sensorineural hearing loss) ICD-10-CM: H90.5  ICD-9-CM: 389.10  Unknown - Present Yes        Allergic rhinitis ICD-10-CM: J30.9  ICD-9-CM: 477.9  3/7/2013 - Present Yes               Part of this note was written by using a voice dictation software and the note has been proof read but may still contain some grammatical/other typographical errors.     Signed By: Macie Quiroz NP   [address] [place], [place] [address]vard Service    May 4, 2021  11:41 AM

## 2021-05-04 NOTE — PROGRESS NOTES
's follow-up telephone visit attempted. The calls to patient's room phone and cell phone were unanswered. Chaplains remain available for support.      Tricia Rhoades 69  Board Certified

## 2021-05-04 NOTE — PROGRESS NOTES
's follow-up with staff to explore patient needs. A follow-up phone call is planned today.      Tricia Jay 69  Board Certified

## 2021-05-04 NOTE — PROGRESS NOTES
Problem: Risk for Spread of Infection  Goal: Prevent transmission of infectious organism to others  Description: Prevent the transmission of infectious organisms to other patients, staff members, and visitors. Outcome: Progressing Towards Goal     Problem: Patient Education:  Go to Education Activity  Goal: Patient/Family Education  Outcome: Progressing Towards Goal     Problem: Gas Exchange - Impaired  Goal: Absence of hypoxia  Outcome: Progressing Towards Goal  Goal: Promote optimal lung function  Outcome: Progressing Towards Goal     Problem: Breathing Pattern - Ineffective  Goal: Ability to achieve and maintain a regular respiratory rate  Outcome: Progressing Towards Goal     Problem:  Body Temperature -  Risk of, Imbalanced  Goal: Ability to maintain a body temperature within defined limits  Outcome: Progressing Towards Goal  Goal: Will regain or maintain usual level of consciousness  Outcome: Progressing Towards Goal  Goal: Complications related to the disease process, condition or treatment will be avoided or minimized  Outcome: Progressing Towards Goal     Problem: Nutrition Deficits  Goal: Optimize nutrtional status  Outcome: Progressing Towards Goal     Problem: Risk for Fluid Volume Deficit  Goal: Maintain normal heart rhythm  Outcome: Progressing Towards Goal  Goal: Maintain absence of muscle cramping  Outcome: Progressing Towards Goal  Goal: Maintain normal serum potassium, sodium, calcium, phosphorus, and pH  Outcome: Progressing Towards Goal     Problem: Loneliness or Risk for Loneliness  Goal: Demonstrate positive use of time alone when socialization is not possible  Outcome: Progressing Towards Goal     Problem: Fatigue  Goal: Verbalize increase energy and improved vitality  Outcome: Progressing Towards Goal     Problem: Patient Education: Go to Patient Education Activity  Goal: Patient/Family Education  Outcome: Progressing Towards Goal     Pt in no distress, no C/O pain/N/V/diarrhea, tolerating meds/diet, POC/safety reviewed, pt states she comprehends POC, IV intact, oxygen assessed.

## 2021-05-05 PROBLEM — J96.01 ACUTE HYPOXEMIC RESPIRATORY FAILURE DUE TO COVID-19 (HCC): Status: ACTIVE | Noted: 2021-05-02

## 2021-05-05 LAB
ANION GAP SERPL CALC-SCNC: 7 MMOL/L (ref 7–16)
BUN SERPL-MCNC: 20 MG/DL (ref 6–23)
CALCIUM SERPL-MCNC: 8.7 MG/DL (ref 8.3–10.4)
CHLORIDE SERPL-SCNC: 108 MMOL/L (ref 98–107)
CO2 SERPL-SCNC: 26 MMOL/L (ref 21–32)
CREAT SERPL-MCNC: 0.61 MG/DL (ref 0.6–1)
ERYTHROCYTE [DISTWIDTH] IN BLOOD BY AUTOMATED COUNT: 13.3 % (ref 11.9–14.6)
GLUCOSE SERPL-MCNC: 97 MG/DL (ref 65–100)
HCT VFR BLD AUTO: 36.2 % (ref 35.8–46.3)
HGB BLD-MCNC: 12 G/DL (ref 11.7–15.4)
MAGNESIUM SERPL-MCNC: 2.5 MG/DL (ref 1.8–2.4)
MCH RBC QN AUTO: 30.9 PG (ref 26.1–32.9)
MCHC RBC AUTO-ENTMCNC: 33.1 G/DL (ref 31.4–35)
MCV RBC AUTO: 93.3 FL (ref 79.6–97.8)
NRBC # BLD: 0 K/UL (ref 0–0.2)
PLATELET # BLD AUTO: 315 K/UL (ref 150–450)
PMV BLD AUTO: 10.1 FL (ref 9.4–12.3)
POTASSIUM SERPL-SCNC: 3.7 MMOL/L (ref 3.5–5.1)
RBC # BLD AUTO: 3.88 M/UL (ref 4.05–5.2)
SODIUM SERPL-SCNC: 141 MMOL/L (ref 136–145)
WBC # BLD AUTO: 5.4 K/UL (ref 4.3–11.1)

## 2021-05-05 PROCEDURE — 94760 N-INVAS EAR/PLS OXIMETRY 1: CPT

## 2021-05-05 PROCEDURE — 83735 ASSAY OF MAGNESIUM: CPT

## 2021-05-05 PROCEDURE — 74011250637 HC RX REV CODE- 250/637: Performed by: INTERNAL MEDICINE

## 2021-05-05 PROCEDURE — 74011000250 HC RX REV CODE- 250: Performed by: INTERNAL MEDICINE

## 2021-05-05 PROCEDURE — 65270000015 HC RM PRIVATE ONCOLOGY

## 2021-05-05 PROCEDURE — 94640 AIRWAY INHALATION TREATMENT: CPT

## 2021-05-05 PROCEDURE — 85027 COMPLETE CBC AUTOMATED: CPT

## 2021-05-05 PROCEDURE — 80048 BASIC METABOLIC PNL TOTAL CA: CPT

## 2021-05-05 PROCEDURE — 74011250636 HC RX REV CODE- 250/636: Performed by: INTERNAL MEDICINE

## 2021-05-05 RX ORDER — BUDESONIDE 0.5 MG/2ML
500 INHALANT ORAL
Status: DISCONTINUED | OUTPATIENT
Start: 2021-05-05 | End: 2021-05-06 | Stop reason: HOSPADM

## 2021-05-05 RX ORDER — IPRATROPIUM BROMIDE AND ALBUTEROL SULFATE 2.5; .5 MG/3ML; MG/3ML
3 SOLUTION RESPIRATORY (INHALATION)
Status: DISCONTINUED | OUTPATIENT
Start: 2021-05-05 | End: 2021-05-06 | Stop reason: HOSPADM

## 2021-05-05 RX ADMIN — BUDESONIDE 500 MCG: 0.5 INHALANT RESPIRATORY (INHALATION) at 20:00

## 2021-05-05 RX ADMIN — IPRATROPIUM BROMIDE AND ALBUTEROL SULFATE 3 ML: .5; 3 SOLUTION RESPIRATORY (INHALATION) at 20:00

## 2021-05-05 RX ADMIN — CHOLECALCIFEROL TAB 125 MCG (5000 UNIT) 6000 UNITS: 125 TAB at 08:16

## 2021-05-05 RX ADMIN — IPRATROPIUM BROMIDE AND ALBUTEROL SULFATE 3 ML: .5; 3 SOLUTION RESPIRATORY (INHALATION) at 15:30

## 2021-05-05 RX ADMIN — ENOXAPARIN SODIUM 30 MG: 30 INJECTION SUBCUTANEOUS at 08:16

## 2021-05-05 RX ADMIN — DEXAMETHASONE 6 MG: 4 TABLET ORAL at 08:16

## 2021-05-05 RX ADMIN — Medication 10 ML: at 22:00

## 2021-05-05 RX ADMIN — IPRATROPIUM BROMIDE AND ALBUTEROL SULFATE 3 ML: .5; 3 SOLUTION RESPIRATORY (INHALATION) at 11:50

## 2021-05-05 RX ADMIN — Medication 10 ML: at 13:13

## 2021-05-05 RX ADMIN — Medication 10 ML: at 06:10

## 2021-05-05 RX ADMIN — ENOXAPARIN SODIUM 30 MG: 30 INJECTION SUBCUTANEOUS at 20:23

## 2021-05-05 NOTE — PROGRESS NOTES
Problem: Gas Exchange - Impaired  Goal: Absence of hypoxia  Outcome: Progressing Towards Goal. Decreased to 3L NC  Goal: Promote optimal lung function  Outcome: Progressing Towards Goal. Instructed in IS     Problem: Breathing Pattern - Ineffective  Goal: Ability to achieve and maintain a regular respiratory rate  Outcome: Progressing Towards Goal     Problem: Nutrition Deficits  Goal: Optimize nutrtional status  Outcome: Progressing Towards Goal. Supplement added.  Enc po intake     Problem: Fatigue  Goal: Verbalize increase energy and improved vitality  Outcome: Progressing Towards Goal

## 2021-05-05 NOTE — PROGRESS NOTES
Problem: Risk for Spread of Infection  Goal: Prevent transmission of infectious organism to others  Description: Prevent the transmission of infectious organisms to other patients, staff members, and visitors. Outcome: Progressing Towards Goal     Problem: Patient Education:  Go to Education Activity  Goal: Patient/Family Education  Outcome: Progressing Towards Goal     Problem: Gas Exchange - Impaired  Goal: Absence of hypoxia  Outcome: Progressing Towards Goal  Goal: Promote optimal lung function  Outcome: Progressing Towards Goal     Problem: Breathing Pattern - Ineffective  Goal: Ability to achieve and maintain a regular respiratory rate  Outcome: Progressing Towards Goal     Problem: Body Temperature -  Risk of, Imbalanced  Goal: Ability to maintain a body temperature within defined limits  Outcome: Progressing Towards Goal  Goal: Will regain or maintain usual level of consciousness  Outcome: Progressing Towards Goal  Goal: Complications related to the disease process, condition or treatment will be avoided or minimized  Outcome: Progressing Towards Goal     Problem: Nutrition Deficits  Goal: Optimize nutrtional status  Outcome: Progressing Towards Goal     Problem: Risk for Fluid Volume Deficit  Goal: Maintain normal heart rhythm  Outcome: Progressing Towards Goal  Goal: Maintain absence of muscle cramping  Outcome: Progressing Towards Goal     Problem: Falls - Risk of  Goal: *Absence of Falls  Description: Document Jalen Fall Risk and appropriate interventions in the flowsheet.   Outcome: Progressing Towards Goal  Note: Fall Risk Interventions:            Medication Interventions: Teach patient to arise slowly

## 2021-05-05 NOTE — PROGRESS NOTES
Chart screened by  for discharge planning. No needs identified at this time.     Will continue to follow for discharge planning needs  Please consult  if any new issues arise    Care Management Interventions  PCP Verified by CM: Yes(Lauren Calvo MD)  Mode of Transport at Discharge: Self  Transition of Care Consult (CM Consult): Discharge Planning  Discharge Durable Medical Equipment: No  Physical Therapy Consult: No  Occupational Therapy Consult: No  Speech Therapy Consult: No  Current Support Network: Lives with Spouse, Own Home(Carl Goldman spouse [phone])  Confirm Follow Up Transport: Family  The Plan for Transition of Care is Related to the Following Treatment Goals : no further needs  The Patient and/or Patient Representative was Provided with a Choice of Provider and Agrees with the Discharge Plan?: Yes  Name of the Patient Representative Who was Provided with a Choice of Provider and Agrees with the Discharge Plan: patient  Freedom of Choice List was Provided with Basic Dialogue that Supports the Patient's Individualized Plan of Care/Goals, Treatment Preferences and Shares the Quality Data Associated with the Providers?: Yes   Resource Information Provided?: No  Discharge Location  Discharge Placement: Unable to determine at this time

## 2021-05-05 NOTE — PROGRESS NOTES
Comprehensive Nutrition Assessment    Type and Reason for Visit: Initial, Positive nutrition screen  Best Practice Alert for Malnutrition Screening Tool: Recently Lost Weight Without Trying: Unsure, If Yes, How Much Weight Loss: Unsure, Eating Poorly Due to Decreased Appetite: Yes     This assessment was completed remotely from within the hospital.    Nutrition Recommendations/Plan:    Continue current diet   Add Ensure Enlive BID     Malnutrition Assessment:  Malnutrition Status: Insufficient data    Nutrition Assessment:   Nutrition History: Unable to assess. Nutrition Background: Pt presents with 10 days of dyspnea, intermittent dry cough, fever, and chills. Per H&P, pt had mild nausea and loss of taste and smell PTA. Pt admitted for COVID PNA. No notable PMH. Daily Update:  Pt's RN reports she is eating better today. Noted intakes 50-75% at meals. RN reports pt was given an Ensure today and pt drank some of supplement. No noted recent wt hx per chart review. Current Nutrition Therapies:  DIET REGULAR    Current Intake:   Average Meal Intake: 51-75% Average Supplement Intake: None ordered      Anthropometric Measures:  Height: 5' 6\" (167.6 cm)  Current Body Wt: 77.1 kg (169 lb 15.6 oz)(5/2), Weight source: Not specified  BMI: 27.4, Overweight (BMI 25.0-29. 9)  Admission Body Weight: 169 lb 15.6 oz(stated wt; 5/2)  Ideal Body Weight (lbs) (Calculated): 130 lbs (59 kg), 130.7 %  Usual Body Wt:  , Percent weight change:            Edema: No data recorded   Estimated Daily Nutrient Needs:  Energy (kcal/day): 7822-5500 (Kcal/kg(20-25), Weight Used: Current(77.1kg))  Protein (g/day): 62-77(0.8-1gm/kg) Weight Used: (Current(77.1kg))  Fluid (ml/day):   (1 ml/kcal)    Nutrition Diagnosis:   · Predicted inadequate energy intake related to altered taste perception(predicted loss of appetite) as evidenced by intake 51-75%(admission for COVID PNA)    Nutrition Interventions:   Food and/or Nutrient Delivery: Continue current diet, Start oral nutrition supplement     Coordination of Nutrition Care: Continue to monitor while inpatient  Plan of Care discussed with Lanny Joyner RN    Goals: Active Goal: Meet >75% estimated nutrition needs within 7 days    Nutrition Monitoring and Evaluation:      Food/Nutrient Intake Outcomes: Food and nutrient intake, Supplement intake       Discharge Planning:     Too soon to determine    Electronically signed by Kimi Sarmiento MS, RDN, LD 5/5/2021 at 4:15 PM  Contact: [phone]

## 2021-05-05 NOTE — PROGRESS NOTES
Hospitalist Progress Note     Admit Date:  2021  2:08 PM   Name:  Felipe Hannon   Age:  62 y.o.  :  1960   MRN:  [id number]   PCP:  Harjeet Villafana MD  Presenting Complaint: Positive For Covid-19 and Chest Pain    Initial Admission Diagnosis: Pneumonia due to COVID-19 virus [U07.1, J12.82]     Assessment and Plan:     Hospital Problems as of 2021 Date Reviewed: 2020          Codes Class Noted - Resolved POA    * (Principal) Acute hypoxemic respiratory failure due to COVID-19 [facility]) ICD-10-CM: U07.1, J96.01  ICD-9-CM: 518.81, 079.89, 799.02  2021 - Present Yes        SNHL (sensorineural hearing loss) ICD-10-CM: H90.5  ICD-9-CM: 389.10  Unknown - Present Yes        Allergic rhinitis ICD-10-CM: J30.9  ICD-9-CM: 477.9  3/7/2013 - Present Yes              Plan:  [place]  21 - start nebs today  -wean o2 as able. Main barrier to discharge. Can go home if stable or improved on o2 for 2-3 days   -decadron EOT   -nebs    Diet:  DIET REGULAR  DVT ppx:  [facility] Course:   Bebeto Engel a 62 y.o. female with medical history of migraine HA presented to [employer] complaint of 10 days of dyspnea with associated intermittent dry cough, fever and chills, mild nausea without vomiting and loss of taste/smell. Patient reported she did test COVID positive x 1 week PTA and also reports that her  is also COVID positive. Denies receiving COVID vaccine. Admitted for hypoxia, started on steroids. 24hr Events/Subjective:   Pt fatigued otherwise denies complaints. Rare cough, not bothersome. No SOB on 3L oxygen.   No CP, fevers    No other complaints  Objective:     Patient Vitals for the past 24 hrs:   Temp Pulse Resp BP SpO2   21 0729 98.7 °F (37.1 °C) 63 16 123/73 93 %   21 0358 99 °F (37.2 °C) 62 18 103/69 96 %   21 2341 98.8 °F (37.1 °C) 60 18 124/73 97 %   21 2037     95 %   21 1931 98.6 °F (37 °C) 68 18 121/72 91 %   21 1834 98.5 °F (36.9 °C) 65 17 132/72 91 %   05/04/21 1225 98 °F (36.7 °C) (!) 58 18 (!) 109/56 98 %     Oxygen Therapy  O2 Sat (%): 93 % (05/05/21 0729)  Pulse via Oximetry: 71 beats per minute (05/04/21 2037)  O2 Device: Nasal cannula (05/05/21 0729)  Skin Assessment: Clean, dry, & intact (05/04/21 1277)  Skin Protection for O2 Device: No (05/04/21 0821)  O2 Flow Rate (L/min): 4 l/min (05/05/21 0729)    Estimated body mass index is 27.44 kg/m² as calculated from the following:    Height as of this encounter: 5' 6\" (1.676 m). Weight as of this encounter: 77.1 kg (170 lb). No intake or output data in the 24 hours ending 05/05/21 0954    *Note that automatically entered I/Os may not be accurate; dependent on patient compliance with collection and accurate  by techs. General:    Well nourished. No overt distress  CV:   RRR. No edema. No JVD  Lungs:   Even, Unlabored  Abdomen:   nondistended. Extremities: Warm and dry. No cyanosis   Skin:     No rashes. Normal coloration  Neuro:  No gross focal deficits.      I have reviewed all labs, meds, and other studies shown below:  Last 24hr Labs:  Recent Results (from the past 24 hour(s))   CBC W/O DIFF    Collection Time: 05/05/21  4:06 AM   Result Value Ref Range    WBC 5.4 4.3 - 11.1 K/uL    RBC 3.88 (L) 4.05 - 5.2 M/uL    HGB 12.0 11.7 - 15.4 g/dL    HCT 36.2 35.8 - 46.3 %    MCV 93.3 79.6 - 97.8 FL    MCH 30.9 26.1 - 32.9 PG    MCHC 33.1 31.4 - 35.0 g/dL    RDW 13.3 11.9 - 14.6 %    PLATELET 745 908 - 130 K/uL    MPV 10.1 9.4 - 12.3 FL    ABSOLUTE NRBC 0.00 0.0 - 0.2 K/uL   MAGNESIUM    Collection Time: 05/05/21  4:06 AM   Result Value Ref Range    Magnesium 2.5 (H) 1.8 - 2.4 mg/dL   METABOLIC PANEL, BASIC    Collection Time: 05/05/21  4:06 AM   Result Value Ref Range    Sodium 141 136 - 145 mmol/L    Potassium 3.7 3.5 - 5.1 mmol/L    Chloride 108 (H) 98 - 107 mmol/L    CO2 26 21 - 32 mmol/L    Anion gap 7 7 - 16 mmol/L    Glucose 97 65 - 100 mg/dL    BUN 20 6 - 23 MG/DL    Creatinine 0.61 0.6 - 1.0 MG/DL    GFR est AA >60 >60 ml/min/1.73m2    GFR est non-AA >60 >60 ml/min/1.73m2    Calcium 8.7 8.3 - 10.4 MG/DL       All Micro Results     None          SARS-CoV-2 Lab Results  \"Novel Coronavirus\" Test: No results found for: COV2NT   \"Emergent Disease\" Test: No results found for: EDPR  \"SARS-COV-2\" Test: No results found for: XGCOVT  Rapid Test: No results found for: COVR         Current Meds:  Current Facility-Administered Medications   Medication Dose Route Frequency    albuterol-ipratropium (DUO-NEB) 2.5 MG-0.5 MG/3 ML  3 mL Nebulization Q6H RT    budesonide (PULMICORT) 500 mcg/2 ml nebulizer suspension  500 mcg Nebulization BID RT    HYDROcodone-homatropine (HYCODAN) 5-1.5 mg/5 mL (5 mL) oral solution 5 mL  5 mL Oral Q4H PRN    enoxaparin (LOVENOX) injection 30 mg  30 mg SubCUTAneous Q12H    acetaminophen (TYLENOL) tablet 650 mg  650 mg Oral Q6H PRN    Or    acetaminophen (TYLENOL) suppository 650 mg  650 mg Rectal Q6H PRN    guaiFENesin-dextromethorphan (ROBITUSSIN DM) 100-10 mg/5 mL syrup 5 mL  5 mL Oral Q4H PRN    dexAMETHasone (DECADRON) tablet 6 mg  6 mg Oral DAILY    cholecalciferol (VITAMIN D3) tablet 6,000 Units  6,000 Units Oral DAILY    Followed by   Shannan Stark ON 5/10/2021] cholecalciferol (VITAMIN D3) (1000 Units /25 mcg) tablet 2,000 Units  2,000 Units Oral DAILY    sodium chloride (NS) flush 5-40 mL  5-40 mL IntraVENous Q8H    sodium chloride (NS) flush 5-40 mL  5-40 mL IntraVENous PRN    polyethylene glycol (MIRALAX) packet 17 g  17 g Oral DAILY PRN    promethazine (PHENERGAN) tablet 12.5 mg  12.5 mg Oral Q6H PRN    Or    ondansetron (ZOFRAN) injection 4 mg  4 mg IntraVENous Q6H PRN    0.9% sodium chloride infusion 250 mL  250 mL IntraVENous PRN       Other Studies:  No results found for this visit on 05/02/21. No results found.     Signed:  Primo Parr MD

## 2021-05-06 VITALS
HEIGHT: 66 IN | OXYGEN SATURATION: 96 % | WEIGHT: 170 LBS | TEMPERATURE: 98.6 F | HEART RATE: 78 BPM | RESPIRATION RATE: 18 BRPM | DIASTOLIC BLOOD PRESSURE: 71 MMHG | BODY MASS INDEX: 27.32 KG/M2 | SYSTOLIC BLOOD PRESSURE: 109 MMHG

## 2021-05-06 LAB
GLUCOSE BLD STRIP.AUTO-MCNC: 132 MG/DL (ref 65–100)
GLUCOSE BLD STRIP.AUTO-MCNC: 87 MG/DL (ref 65–100)
SERVICE CMNT-IMP: ABNORMAL
SERVICE CMNT-IMP: NORMAL

## 2021-05-06 PROCEDURE — 94640 AIRWAY INHALATION TREATMENT: CPT

## 2021-05-06 PROCEDURE — 74011250637 HC RX REV CODE- 250/637: Performed by: INTERNAL MEDICINE

## 2021-05-06 PROCEDURE — 82962 GLUCOSE BLOOD TEST: CPT

## 2021-05-06 PROCEDURE — 77010033678 HC OXYGEN DAILY

## 2021-05-06 PROCEDURE — 74011000250 HC RX REV CODE- 250: Performed by: INTERNAL MEDICINE

## 2021-05-06 PROCEDURE — 74011250636 HC RX REV CODE- 250/636: Performed by: INTERNAL MEDICINE

## 2021-05-06 PROCEDURE — 94760 N-INVAS EAR/PLS OXIMETRY 1: CPT

## 2021-05-06 RX ORDER — DEXAMETHASONE 6 MG/1
6 TABLET ORAL DAILY
Qty: 5 TAB | Refills: 0 | Status: SHIPPED | OUTPATIENT
Start: 2021-05-06 | End: 2021-05-11

## 2021-05-06 RX ORDER — ALBUTEROL SULFATE 90 UG/1
2 AEROSOL, METERED RESPIRATORY (INHALATION)
Qty: 1 INHALER | Refills: 1 | Status: SHIPPED | OUTPATIENT
Start: 2021-05-06

## 2021-05-06 RX ADMIN — CHOLECALCIFEROL TAB 125 MCG (5000 UNIT) 6000 UNITS: 125 TAB at 08:27

## 2021-05-06 RX ADMIN — Medication 10 ML: at 05:43

## 2021-05-06 RX ADMIN — DEXAMETHASONE 6 MG: 4 TABLET ORAL at 08:26

## 2021-05-06 RX ADMIN — BUDESONIDE 500 MCG: 0.5 INHALANT RESPIRATORY (INHALATION) at 08:53

## 2021-05-06 RX ADMIN — ENOXAPARIN SODIUM 30 MG: 30 INJECTION SUBCUTANEOUS at 08:26

## 2021-05-06 RX ADMIN — IPRATROPIUM BROMIDE AND ALBUTEROL SULFATE 3 ML: .5; 3 SOLUTION RESPIRATORY (INHALATION) at 08:53

## 2021-05-06 NOTE — PROGRESS NOTES
Pt is for discharge home today with no needs/supportive care orders received for CM at this time.   Pt will have close follow up with PCP    Milestones met    Care Management Interventions  PCP Verified by CM: Yes(Lauren Calvo MD)  Mode of Transport at Discharge: Self  Transition of Care Consult (CM Consult): Discharge Planning  Discharge Durable Medical Equipment: No  Physical Therapy Consult: No  Occupational Therapy Consult: No  Speech Therapy Consult: No  Current Support Network: Lives with Spouse, Own Home(Carl Gao spouse [phone])  Confirm Follow Up Transport: Family  The Plan for Transition of Care is Related to the Following Treatment Goals : no further needs  The Patient and/or Patient Representative was Provided with a Choice of Provider and Agrees with the Discharge Plan?: Yes  Name of the Patient Representative Who was Provided with a Choice of Provider and Agrees with the Discharge Plan: patient  Freedom of Choice List was Provided with Basic Dialogue that Supports the Patient's Individualized Plan of Care/Goals, Treatment Preferences and Shares the Quality Data Associated with the Providers?: Yes   Resource Information Provided?: No  Discharge Location  Discharge Placement: Home

## 2021-05-06 NOTE — DISCHARGE SUMMARY
Hospitalist Discharge Summary     Admit Date:  2021  2:08 PM   DC note date: 2021  Name:  Yecenia Collier   Age:  62 y.o.  :  1960   MRN:  [id number]   PCP:  Lawanda Alvares MD  Treatment Team: Attending Provider: Radha Piper MD; Care Manager: Danielito Reddy RN  Presenting Complaint: Positive For Covid-19 and Chest Pain    Initial Admission Diagnosis: Pneumonia due to COVID-19 virus [U07.1, J12.82]     Problem List for this Hospitalization:  Hospital Problems as of 2021 Date Reviewed: 2020          Codes Class Noted - Resolved POA    * (Principal) Acute hypoxemic respiratory failure due to COVID-19 [facility]) ICD-10-CM: U07.1, J96.01  ICD-9-CM: 518.81, 079.89, 799.02  2021 - Present Yes        SNHL (sensorineural hearing loss) ICD-10-CM: H90.5  ICD-9-CM: 389.10  Unknown - Present Yes        Allergic rhinitis ICD-10-CM: J30.9  ICD-9-CM: 477.9  3/7/2013 - Present Yes                Admission HPI from 2021:    \" Yecenia Collier is a 62 y.o. female with medical history who presented to ED with report of 10 days of dyspnea, intermittent dry cough, fever and chills. Mild nausea without vomiting. Has lost taste/smell. No history of intraocular lung disease, tobacco abuse, diabetes or other high risk factors. Uncertain of sick contacts. Has not received vaccine.     WBC is 6.7, hemoglobin 12, sodium 140, potassium 3.6, creatinine 0.67, ALT 40, AST slightly elevated 39     Chest x-ray showing bilateral bibasilar infiltrates consistent with Covid pneumonia \"    Hospital Course:  Admitted for COVID hypoxia, started on steroids. Improved slowly. On 2L today down from 3L yesterday. She feels better and would like to go home, has been asking for a few days. Will assess ambulatory oxygen but I think this should be fine given her trajectory. Steroids and albuterol inhaler given.     Disposition: Home or Self Care  Activity: Activity as tolerated  Diet: DIET REGULAR  DIET NUTRITIONAL SUPPLEMENTS Lunch, Dinner; Cognea ( )  Code Status: Full Code    Follow Up Orders: Follow-up Appointments   Procedures    FOLLOW UP VISIT Appointment in: Two Weeks PCP for follow up     PCP for follow up     Standing Status:   Standing     Number of Occurrences:   1     Order Specific Question:   Appointment in     Answer: Two Weeks       Follow-up Information     Follow up With Specialties Details Why Contact Info    Lauren Mackey MD Internal Medicine In 2 weeks follow up hospitalization for [address] [place] [address] [address] [place] [address]  [phone]            Plan was discussed with pt. All questions answered. Patient was stable at time of discharge. Given instructions to call a physician or return if any concerns. Discharge summary and encounter summary was sent to PCP electronically via \"Comm Mgt\" link in [facility], if possible. Discharge Info:   Current Discharge Medication List      START taking these medications    Details   albuterol (PROVENTIL HFA, VENTOLIN HFA, PROAIR HFA) 90 mcg/actuation inhaler Take 2 Puffs by inhalation every four (4) hours as needed for Wheezing or Shortness of Breath. Qty: 1 Inhaler, Refills: 1         CONTINUE these medications which have CHANGED    Details   dexAMETHasone (DECADRON) 6 mg tablet Take 1 Tab by mouth daily for 5 days. Qty: 5 Tab, Refills: 0         CONTINUE these medications which have NOT CHANGED    Details   nutritional supplement (BONES & JOINTS PO) Take  by mouth.      triamcinolone acetonide (KENALOG) 0.1 % dental paste APPLY TO THE AFFECTED AREA TWICE DAILY  Qty: 5 g, Refills: 2      promethazine (PHENERGAN) 25 mg tablet Take 25 mg by mouth every eight (8) hours as needed for Nausea. temazepam (RESTORIL) 15 mg capsule Take 15 mg by mouth nightly as needed for Sleep. HYDROmorphone (DILAUDID) 2 mg tablet Take 2 mg by mouth every four (4) hours as needed for Pain.  Pt may take 1-2 tablets Q4-6hrs PRN for pain senna-docusate (SENOKOT-S) 8.6-50 mg per tablet Take 2 Tabs by mouth daily as needed for Constipation. multivit,tx with iron,minerals (THEREMS-M PO) Take 1 Tab by mouth daily. glucosamine/chondr costello A sod (OSTEO BI-FLEX PO) Take 2 Tabs by mouth daily. calcium citrate (CITRACAL PO) Take 1 Cap by mouth daily. acetaminophen (TYLENOL) 325 mg tablet Take 500 mg by mouth every six (6) hours as needed for Pain.      multivitamin (ONE A DAY) tablet Take 1 Tab by mouth daily. calcium carb/vitamin D2/soyb (ONE-A-DAY BONE STRENGTH PO) Take  by mouth. Procedures done this admission:  * No surgery found *    Consults this admission:  None    Echocardiogram/EKG results:  No results found for this visit on 05/02/21. Results for orders placed or performed during the hospital encounter of 05/02/21   EKG, 12 LEAD, INITIAL   Result Value Ref Range    Ventricular Rate 62 BPM    Atrial Rate 62 BPM    P-R Interval 140 ms    QRS Duration 80 ms    Q-T Interval 388 ms    QTC Calculation (Bezet) 393 ms    Calculated P Axis 65 degrees    Calculated R Axis -19 degrees    Calculated T Axis 72 degrees    Diagnosis       Normal sinus rhythm  Normal ECG  When compared with ECG of 29-APR-2021 10:52,  Vent. rate has decreased BY  32 BPM  QRS axis Shifted right  Confirmed by Brtet Martin MD, Apollo Thomas ([id number]) on 5/3/2021 12:04:51 AM         Diagnostic Imaging/Tests:   Ervin Pipestem Chest Port    Result Date: 5/2/2021  EXAM: XR CHEST PORT INDICATION: Syncope COMPARISON: 4/29/2021 FINDINGS: A portable AP radiograph of the chest was obtained at 1236 hours. The patient is on a cardiac monitor. Bibasilar increased interstitial markings unchanged. The cardiac and mediastinal contours and pulmonary vascularity are normal.  The bones and soft tissues are grossly within normal limits. Bilateral Covid pneumonia unchanged. Xr Chest Ap Lat    Result Date: 4/29/2021  EXAM: XR CHEST AP LAT INDICATION: chest pain COMPARISON: None. FINDINGS: PA and lateral radiographs of the chest demonstrate minimal linear atelectasis of the right lower lobe. The cardiac and mediastinal contours and pulmonary vascularity are normal. The bones and soft tissues are within normal limits. Minimal linear atelectasis of the right lower lobe.        All Micro Results     None          SARS-CoV-2 Lab Results  \"Novel Coronavirus\" Test: No results found for: COV2NT   \"Emergent Disease\" Test: No results found for: EDPR  \"SARS-COV-2\" Test: No results found for: XGCOVT  Rapid Test: No results found for: COVR         Labs: Results:       BMP, Mg, Phos Recent Labs     05/05/21  0406 05/04/21  0400    144   K 3.7 3.5   * 110*   CO2 26 24   AGAP 7 10   BUN 20 17   CREA 0.61 0.54*   CA 8.7 8.7   GLU 97 87   MG 2.5*  --       CBC Recent Labs     05/05/21  0406   WBC 5.4   RBC 3.88*   HGB 12.0   HCT 36.2         LFT Recent Labs     05/04/21  0400   ALT 79*      TP 6.7   ALB 2.7*   GLOB 4.0*   AGRAT 0.7*      Cardiac Testing No results found for: BNPP, BNP, CPK, RCK1, RCK2, RCK3, RCK4, CKMB, CKNDX, CKND1, TROPT, TROIQ   Coagulation Tests Lab Results   Component Value Date/Time    Prothrombin time 12.7 01/30/2020 01:13 PM    INR 0.9 01/30/2020 01:13 PM    aPTT 28.5 01/30/2020 01:13 PM      A1c Lab Results   Component Value Date/Time    Hemoglobin A1c 5.9 02/06/2020 11:18 AM    Hemoglobin A1c 5.6 04/27/2017 09:21 AM      Lipid Panel Lab Results   Component Value Date/Time    Cholesterol, total 257 (H) 04/27/2017 09:21 AM    HDL Cholesterol 52 04/27/2017 09:21 AM    LDL, calculated 181 (H) 04/27/2017 09:21 AM    VLDL, calculated 24 04/27/2017 09:21 AM    Triglyceride 122 04/27/2017 09:21 AM    CHOL/HDL Ratio 4.5 08/29/2014 09:30 AM      Thyroid Panel Lab Results   Component Value Date/Time    TSH 2.140 04/27/2017 09:21 AM    T4, Free 0.76 04/21/2016 09:29 AM    T4, Free 0.73 05/28/2015 02:27 PM        Most Recent UA Lab Results   Component Value Date/Time Color YELLOW 01/30/2020 01:13 PM    Appearance CLEAR 01/30/2020 01:13 PM    Specific gravity 1.025 (H) 01/30/2020 01:13 PM    pH (UA) 5.5 01/30/2020 01:13 PM    Protein NEGATIVE  01/30/2020 01:13 PM    Glucose NEGATIVE  01/30/2020 01:13 PM    Ketone NEGATIVE  01/30/2020 01:13 PM    Bilirubin NEGATIVE  01/30/2020 01:13 PM    Blood NEGATIVE  01/30/2020 01:13 PM    Urobilinogen 0.2 01/30/2020 01:13 PM    Nitrites NEGATIVE  01/30/2020 01:13 PM    Leukocyte Esterase NEGATIVE  01/30/2020 01:13 PM    WBC 5-10 (A) 04/27/2017 09:22 AM    RBC 5-10 (A) 04/27/2017 09:22 AM    Bacteria 2+ (A) 04/27/2017 09:22 AM          All Labs from Last 24 Hrs:  Recent Results (from the past 24 hour(s))   GLUCOSE, POC    Collection Time: 05/06/21  7:46 AM   Result Value Ref Range    Glucose (POC) 87 65 - 100 mg/dL    Performed by Victoriano        Discharge Exam:  Patient Vitals for the past 24 hrs:   Temp Pulse Resp BP SpO2   05/06/21 0853     94 %   05/06/21 0732 98.6 °F (37 °C) 69 16 124/72 92 %   05/06/21 0407 98.5 °F (36.9 °C) 66 16 122/65 94 %   05/05/21 2350 98.4 °F (36.9 °C) 70 16 119/72 92 %   05/05/21 2112     94 %   05/05/21 2024 98.5 °F (36.9 °C) 74 16 133/70 96 %   05/05/21 1554 98 °F (36.7 °C) 67 16 117/66 98 %   05/05/21 1530     96 %   05/05/21 1110     97 %   05/05/21 1109 98.6 °F (37 °C) 69 16 117/72 96 %     Oxygen Therapy  O2 Sat (%): 94 % (05/06/21 0853)  Pulse via Oximetry: 76 beats per minute (05/06/21 0853)  O2 Device: Nasal cannula (05/06/21 0853)  Skin Assessment: Clean, dry, & intact (05/04/21 9592)  Skin Protection for O2 Device: No (05/04/21 0821)  O2 Flow Rate (L/min): 2 l/min (05/06/21 0853)  FIO2 (%): 28 % (05/05/21 2112)    Estimated body mass index is 27.44 kg/m² as calculated from the following:    Height as of this encounter: 5' 6\" (1.676 m). Weight as of this encounter: 77.1 kg (170 lb).     Intake/Output Summary (Last 24 hours) at 5/6/2021 1102  Last data filed at 5/5/2021 1109  Gross per 24 hour   Intake 240 ml   Output    Net 240 ml       *Note that automatically entered I/Os may not be accurate; dependent on patient compliance with collection and accurate  by assistants. General:    Well nourished. No overt distress  Eyes:   Normal sclerae. Extraocular movements intact. ENT:  Normocephalic, atraumatic. Lungs:  Even, Unlabored  Abdomen: Nondistended. Extremities:  No cyanosis or clubbing  Neurologic: CN II-XII grossly intact. No gross focal deficits. Alert. Skin:     No rashes. No jaundice. Psych:  Normal mood and affect.     Current Med List in Hospital:   Current Facility-Administered Medications   Medication Dose Route Frequency    albuterol-ipratropium (DUO-NEB) 2.5 MG-0.5 MG/3 ML  3 mL Nebulization Q6H RT    budesonide (PULMICORT) 500 mcg/2 ml nebulizer suspension  500 mcg Nebulization BID RT    HYDROcodone-homatropine (HYCODAN) 5-1.5 mg/5 mL (5 mL) oral solution 5 mL  5 mL Oral Q4H PRN    enoxaparin (LOVENOX) injection 30 mg  30 mg SubCUTAneous Q12H    acetaminophen (TYLENOL) tablet 650 mg  650 mg Oral Q6H PRN    Or    acetaminophen (TYLENOL) suppository 650 mg  650 mg Rectal Q6H PRN    guaiFENesin-dextromethorphan (ROBITUSSIN DM) 100-10 mg/5 mL syrup 5 mL  5 mL Oral Q4H PRN    dexAMETHasone (DECADRON) tablet 6 mg  6 mg Oral DAILY    cholecalciferol (VITAMIN D3) tablet 6,000 Units  6,000 Units Oral DAILY    Followed by   Nic Baxter ON 5/10/2021] cholecalciferol (VITAMIN D3) (1000 Units /25 mcg) tablet 2,000 Units  2,000 Units Oral DAILY    sodium chloride (NS) flush 5-40 mL  5-40 mL IntraVENous Q8H    sodium chloride (NS) flush 5-40 mL  5-40 mL IntraVENous PRN    polyethylene glycol (MIRALAX) packet 17 g  17 g Oral DAILY PRN    promethazine (PHENERGAN) tablet 12.5 mg  12.5 mg Oral Q6H PRN    Or    ondansetron (ZOFRAN) injection 4 mg  4 mg IntraVENous Q6H PRN    0.9% sodium chloride infusion 250 mL  250 mL IntraVENous PRN       Allergies Allergen Reactions    Codeine Other (comments)     Headaches     Immunization History   Administered Date(s) Administered    Influenza Vaccine PF 09/09/2015    Tdap 04/27/2017       Time spent in patient discharge planning and coordination 35 minutes.     Signed:  Jack Molina MD

## 2021-05-06 NOTE — PROGRESS NOTES
Problem: Risk for Spread of Infection  Goal: Prevent transmission of infectious organism to others  Description: Prevent the transmission of infectious organisms to other patients, staff members, and visitors. Outcome: Progressing Towards Goal     Problem: Gas Exchange - Impaired  Goal: Absence of hypoxia  Outcome: Progressing Towards Goal  Goal: Promote optimal lung function  Outcome: Progressing Towards Goal     Problem: Breathing Pattern - Ineffective  Goal: Ability to achieve and maintain a regular respiratory rate  Outcome: Progressing Towards Goal     Problem: Body Temperature -  Risk of, Imbalanced  Goal: Ability to maintain a body temperature within defined limits  Outcome: Progressing Towards Goal  Goal: Will regain or maintain usual level of consciousness  Outcome: Progressing Towards Goal  Goal: Complications related to the disease process, condition or treatment will be avoided or minimized  Outcome: Progressing Towards Goal     Problem: Nutrition Deficits  Goal: Optimize nutrtional status  Outcome: Progressing Towards Goal     Problem: Risk for Fluid Volume Deficit  Goal: Maintain absence of muscle cramping  Outcome: Progressing Towards Goal  Goal: Maintain normal serum potassium, sodium, calcium, phosphorus, and pH  Outcome: Progressing Towards Goal     Problem: Loneliness or Risk for Loneliness  Goal: Demonstrate positive use of time alone when socialization is not possible  Outcome: Progressing Towards Goal     Problem: Patient Education: Go to Patient Education Activity  Goal: Patient/Family Education  Outcome: Progressing Towards Goal     Problem: Falls - Risk of  Goal: *Absence of Falls  Description: Document Jalen Fall Risk and appropriate interventions in the flowsheet.   Outcome: Progressing Towards Goal  Note: Fall Risk Interventions:            Medication Interventions: Teach patient to arise slowly

## 2021-05-06 NOTE — PROGRESS NOTES
Oxygen Qualifier       Room air: SpO2 with O2 and liter flow   Resting SpO2  94%  94% on room air   Ambulating SpO2  90% 90% on room air     At rest post ambulation patient is 92% on room air. Completed by:     Aaron Westfall

## 2021-05-14 ENCOUNTER — TRANSCRIBE ORDER (OUTPATIENT)
Dept: SCHEDULING | Age: 61
End: 2021-05-14

## 2021-05-14 DIAGNOSIS — Z78.0 ASYMPTOMATIC MENOPAUSE: Primary | ICD-10-CM

## 2021-06-29 ENCOUNTER — HOSPITAL ENCOUNTER (OUTPATIENT)
Dept: MAMMOGRAPHY | Age: 61
Discharge: HOME | End: 2021-06-29
Payer: COMMERCIAL

## 2021-06-29 DIAGNOSIS — Z78.0 ASYMPTOMATIC MENOPAUSE: ICD-10-CM

## 2021-06-29 PROCEDURE — 77080 DXA BONE DENSITY AXIAL: CPT

## 2021-07-24 NOTE — ANESTHESIA PROCEDURE NOTES
Peripheral Block    Start time: 2/6/2020 12:02 PM  End time: 2/6/2020 12:04 PM  Performed by: Ruthy Kapoor MD  Authorized by: Ruthy Kapoor MD       Pre-procedure: Indications: at surgeon's request and post-op pain management    Preanesthetic Checklist: patient identified, risks and benefits discussed, site marked, timeout performed, anesthesia consent given and patient being monitored    Timeout Time: 12:02          Block Type:   Block Type:   Interscalene  Laterality:  Left  Monitoring:  Frequent vital sign checks, heart rate, oxygen, continuous pulse ox and responsive to questions  Injection Technique:  Single shot  Procedures: ultrasound guided and nerve stimulator    Patient Position: supine  Prep: chlorhexidine    Location:  Interscalene  Needle Type:  Stimuplex  Needle Gauge:  22 G  Needle Localization:  Nerve stimulator and ultrasound guidance  Motor Response: minimal motor response >0.4 mA      Assessment:  Number of attempts:  1  Injection Assessment:  Incremental injection every 5 mL, negative aspiration for CSF, no paresthesia, ultrasound image on chart, no intravascular symptoms, negative aspiration for blood and local visualized surrounding nerve on ultrasound  Patient tolerance:  Patient tolerated the procedure well with no immediate complications
- - -

## 2022-01-27 NOTE — PROGRESS NOTES
Gabbi Francis  : 1960  Primary: [employer] [place]  Secondary:  Therapy Center at [address], [place] [address]  [phone]   [phone]      Gabbi Francis has been seen in physical therapy from 21 to 21for 7 visits.   Treatment has been discontinued at this time due to patient failing to return for additional treatment.  Some goals were not met due to discontinuation.  Thank you for this referral.

## 2022-03-18 PROBLEM — J96.01 ACUTE HYPOXEMIC RESPIRATORY FAILURE DUE TO COVID-19 (HCC): Status: ACTIVE | Noted: 2021-05-02

## 2022-03-18 PROBLEM — Z96.612 PAIN DUE TO LEFT SHOULDER JOINT PROSTHESIS (HCC): Status: ACTIVE | Noted: 2020-01-30

## 2022-03-18 PROBLEM — J06.9 URI, ACUTE: Status: ACTIVE | Noted: 2018-01-22

## 2022-03-18 PROBLEM — T84.84XA PAIN DUE TO LEFT SHOULDER JOINT PROSTHESIS (HCC): Status: ACTIVE | Noted: 2020-01-30

## 2022-03-18 PROBLEM — U07.1 ACUTE HYPOXEMIC RESPIRATORY FAILURE DUE TO COVID-19 (HCC): Status: ACTIVE | Noted: 2021-05-02

## 2022-03-19 PROBLEM — T84.84XA ARTIFICIAL JOINT PAIN (HCC): Status: ACTIVE | Noted: 2020-02-06

## 2022-03-20 PROBLEM — M12.812 ARTHROPATHY, TRANSIENT, SHOULDER, LEFT: Status: ACTIVE | Noted: 2020-02-06

## 2022-03-20 PROBLEM — Z96.612 S/P REVERSE TOTAL SHOULDER ARTHROPLASTY, LEFT: Status: ACTIVE | Noted: 2020-01-30

## 2023-02-08 ENCOUNTER — OFFICE VISIT (OUTPATIENT)
Dept: ORTHOPEDIC SURGERY | Age: 63
End: 2023-02-08

## 2023-02-08 DIAGNOSIS — Z09 FOLLOW-UP EXAMINATION AFTER ORTHOPEDIC SURGERY: ICD-10-CM

## 2023-02-08 DIAGNOSIS — Z96.612 PRESENCE OF LEFT ARTIFICIAL SHOULDER JOINT: Primary | ICD-10-CM

## 2023-02-08 NOTE — PROGRESS NOTES
Progress Notes by Gracie French MD at 02/08/22 0945                    Author: Gracie French MD  Service: --  Author Type: Physician       Filed: 02/08/22 1033  Encounter Date: 2/8/2022  Status: Signed                                  Name: Diann Mills   YOB: 1960   Gender: female   MRN: [id number]            HPI: Diann Mills is a  65 y.o. female right-hand-dominant female 3 years status post I&D removal of implant left  shoulder [place] reverse left total shoulder arthroplasty with a proximal humeral osteotomy and revision reverse left total shoulder arthroplasty with a longstem delta xtend prosthesis biceps tenodesis latissimus dorsi and teres major tendon transfers. She returns noting that she is doing fantastic. She had had a previous reverse left total shoulder arthroplasty for a proximal humerus fracture done elsewhere. She returns noting that she is doing fantastic.  she can now unsnap her bra         ROS/Meds/PSH/PMH/FH/SH: A ten system review of systems was performed and is negative other than what is in the HPI. Tobacco:  reports that she has never smoked. She has never used smokeless tobacco.   Visit Vitals        Ht  5' 6\" (1.676 m)        Wt        BMI            Physical Examination:   She is an awake alert pleasant female ambulating without difficulty   She has a full range of cervical spine motion without radicular findings      Her left shoulder has a well-healed deltopectoral incision   The left shoulder has 0 to 170 degrees of active and 0 to 170 degrees passive forward elevation. Internal rotation is to T6. External rotation is to 60 degrees at the side. In the 90 degree abducted position 90 degrees of external and 90 degrees internal rotation   The AC joint is non-tender   SC joint is non-tender. Greater tuberosity is non-tender.    negative biceps   Negative O'Briens sign   negative lift-off sign   Negative belly press sign Negative bear huggers sign   negative drop sign   negative hornblower's sign   No posterior glenohumeral joint line tenderness. No evident excessive external rotation   Rotator cuff strength is 5/5.   negative external rotation stress test.    Negative empty can sign   There is no evident anterior or posterior apprehension with a negative sulcus sign. No instability   negative external and internal Rotation lag sign   Neurovascularly intact. Data Reviewed:                XR: AP, Y and axillary views left shoulder       Clinical Indication               1.            2.                     Report: AP, Y and axillary views left shoulder demonstrate a reverse  longstem total shoulder arthroplasty in excellent position      Impression: Status post reverse longstem left total shoulder arthroplasty       Axel Levin MD                    Impression:                ICD-10-CM  ICD-9-CM             1.           2.            Status post I&D removal of implant left shoulder [place] reverse left total shoulder arthroplasty with a proximal humeral osteotomy and revision  reverse left total shoulder arthroplasty with a longstem delta xtend prosthesis biceps tenodesis latissimus dorsi and teres major tendon transfer 3 years    Status post reverse left total shoulder arthroplasty for proximal humerus fracture done elsewhere    Carpal tunnel syndrome right wrist    Cervical spondylosis most marked at C5-6, C6-7      Plan:    The patient continues to enjoy a phenomenal result. I will recheck her back in 1 year with new AP, Y and axillary views left   2 This is a self limited or minor problem      Follow up: Follow-up and Dispositions       Return in about 1 year (around 2/8/2024).                   Xray at next follow up:   AP, Y and axillary views left         Axel Levin MD

## 2023-07-27 NOTE — PROGRESS NOTES
Beckie Melendez  : 1960  Payor: E[facility], [address] [place] [address] / Plan: [place] [employer]. / Product Type: Commerical /  [address]  at [facility] DULCE ANGUIANO  [address] [facility], [address], [address] [place] [address]  [phone]   [phone]       OUTPATIENT PHYSICAL THERAPY: Daily Treatment Note 2020  Visit Count: 35     ICD-10: Treatment Diagnosis: Pain in left shoulder (M25.512); Presence of left artificial shoulder joint (Z54.812)  Precautions/Allergies: Post op precautions. From EMR: Codeine   TREATMENT PLAN:  Effective Dates: 2020 TO 2020 (30 days). Frequency/Duration: 2 times a week for 30 additional Day(s) MEDICAL/REFERRING DIAGNOSIS:s/p L shoulder I&D, removal of [place] implant, revision reverse TSA, biceps tendonesis, lat dorsi and teres major tendon transfer  L shoulder   DATE OF ONSET: 20 surgery  REFERRING PHYSICIAN: Janes Mcguire MD MD Orders: Eval and Treat; HEP; ROM; Strength; \"continue PT\" (20)  Return MD Appointment: 8/3/20       Beckie Melendez was cleared by the screener to enter the clinic this morning. Pre-treatment Symptoms/Complaints: Says she is doing well. No significant soreness to report after last time. Worked in the yard over the weekend. Pain: Initial:   no pain now. Post Session: no complaints of pain   Medications Last Reviewed: 20    Updated Objective Findings:   No new measure. TREATMENT:     Active Warm up on UBE x10' with alternating double arm and single arm each minute. Therapeutic Exercise: (45 Minutes): Exercises for shoulder and shoulder girdle strength and stabilization as per grid. Exercises modified as indicated. Minimal cues for exercises technique and movement mechanics. HEP: No new HEP.    Date  20 Date  6/3/20 Date  6/10/20 Date  20 Date  6/15/20 Date  20 Date  20 Date  20 Date  20   Activity/Exercise            UBE L 3 x10' L. 3 x10' L. 3 x10' L. 3 x10' L. 3 x10' L. 3 x10' L. 3 x10' L. 3.5  x10' L. 3.5 x10'   Pulleys - - - - - D/C      AROM - - - - - D/C      Serratus press - Standing dynamic hug  Blue 2x15 - Standing dynamic hug   Black   3x12 - Standing dynamic hug   Black   3x10-12 Cable press  7# 3x10 - -   Shoulder Ext - Sitting reverse ext  7# cable  3x10 ea;  pull down, underhand  20# 3x15 Pull down, bilat underhand  17# x10  23# x10  27# x10 Reverse ext  10#  Cable 1x20 Pull down, bilat underhand  27# 3x10 - - -- Horiz add  3# cable  2x15   Middle trapezius - Standing, bilat horiz abd   Red 3x10 - Bent over  3# 1x20 - Bent over   3# 1x20  - - horiz abd  7# cable   2x15   Lower trapezius - Standing bilat \"Y\"  Red 2x10 - Bent over   3# 1x20 - Bent over   3# 1x20 - - Standing billat \"Y\"  Green 2x10 AAROM concentrics   Scapular retractions Landmine Row  17.5# bar  2x15 ea   Landmine Row  17.5# bar  2x15 ea Landmine Row  17.5# bar  3x15 ea Rope Pull  10# cable  2x10 rep Landmine Row  20# bar  3x12 ea Rope Pull  10# cable  3x2-3 Cable row  10# 3x10  - -   Shoulder ER - - Standing,   by side single red  3 x10 - - - - - -   Forward Elevation Landmine press  15# bar  3x10;  Standing overhead modified clean  15# bar 1x5,  Clean and press  15# bar  1x10 Landmine press  15# bar  3x10 ea Landmine press  17.5# bar  3x10;  Standing   Clean and press  15# bar   3x10 Wall slide+lift off  Red 3x10 Landmine press  20# bar  3x10  Wall slide+lift off  Red 3x10 - Overhead press  20# bar   2x10-13; Forward raise to 90-deg  3# 2x15;   Forward reach wrist roll  2.5# 1x6 -   Abduction Standing upright row   15# bar  1x10 - Standing upright row   15# bar  1x10 - - - - Straight arm fly  3# 2x15;  Bent arm fly  2# 2x15;  Upright row  20# bar  2x15 -   Diagonal Flexion D1  Single yellow  2x15;  D2   Single blue  2x15 - D1   Single red  3x10;  D2  Green  3x10 - D1:   Single yellow  3x10;  D2:  single red  3x10 - D1:  3# cable  2x10;    D2:  3# cable  2x10 - D1:  3# cable  2x10;  D2:  3# cable  2x10   Diagonal Extension D1  Single blue  2x15;  D2  Single yellow  2x15 - - - D1:  Single blue  X15, double blue 3x12;  D2:  Single yellow  1x15, double yellow  3x12 - D1:  7# cable  3x10 ea;    D2:  7# cable  3x10 ea - D1:  7# cable  2x15 ea;  D2:  7# cable  2x15 ea   Internal rotation - - Standing,   by side single red  3 x10  - - - - Ball squeeze  5-6\" 2x10 -     Rhythmic Stabilization - - - Red flex bar scaption, slow  2x5 ea,  D2 to 90-deg flex x5 Wall dribble   500-g ball  12 to 6 o'clock  1x30 ea Wall dribble   500-g ball  12 to 6 o'clock  2x40-50 ea;  Red flex bar scaption, slow  3x5 ea Wall dribble   500-g ball  12 to 6 o'clock  1x50 ea;  Blue flex bar scaption, slow  3x5 ea Blue flex bar, 5 ways   1x5 ea; Ball Drops, 3-ways standing,   500-g ball  1x30 ea    Blue flex bar, 5 ways   1x5 ea; Ball Drops, 3-ways standing 90-deg,   500-g ball  1x30 ea,   Bent elbow  Rubber ball 3x20 abd and flex position   Closed kinetic chain--UE Tall plank  1x10\"; Plank on elbows   1x10\"; Side plank on knees 3x5\"; Push up plantigrade  3x5, U/LE oppositesin plantigrade 3x2 3 ea - - Crawl fwd/back 1x10-ft ea;  Bear walk fwd 3x10-ft; Ab roller  3x6 Tall plank to downward dog 2x5 Bear walk fwd 4x10-ft; Stability ball UE walk-out, fwd/back 2x4 steps; Ab roller  3x5 Tall plank to downward dog 1x10;  Plank on knee+alt UE lift 2x10 ea - -   Horiz abd/add   Landmine H.abd/add  15# bar 2x10 - - Landmine H.abd/add  20# bar  3x10 H Abd   7# cable  3x10 ea;  H Add  3# cable  3x10a ea - H Abd   7# cable  2x15 ea;  H Add  3# cable  3x10 ea   Dead Lift    35# bar  3x15 -  35# bar  3x15 - - 40# 2x15   Biceps Curl        20# bar  2x15 -   Reactive Control/Coordination      Small ball catch  Double hand, single hand x5' - Small ball toss x60+ -   -  Treatment/Session Summary:    · Response to Treatment: Continues with very good performance of the exercises. Anterior shoulder weakness noted with these PRE's requiring minimal assist on concentric lifts. · Communication/Consultation:  None today  · Equipment provided today:  None today  · Recommendations/Intent for next treatment session: We will continue with controlled shoulder girdle re-strengthening.      Total Treatment Billable Duration: 45 minutes  PT Patient Time In/Time Out  Time In: 0900  Time Out: [address] [facility], PT    Future Appointments   Date Time Provider Asuncion Mccormick   7/8/2020  9:00 AM Jenna Smith, PT [facility]   7/13/2020  4:00 PM Perfecto Lizzie, PT [facility]   7/15/2020  9:00 AM Perfecto Lizzie, PT [facility]   7/20/2020  9:00 AM Perfecto Lizzie, PT [facility]   7/22/2020  9:00 AM Perfecto Lizzie, PT [facility]   7/27/2020  9:00 AM Perfecto Lizzie, PT [facility]   7/29/2020  9:00 AM Perfecto Lizzie, PT [facility] PROVIDER:[TOKEN:[[id number]],FOLLOWUP:[1 week]],PROVIDER:[TOKEN:[[id number]],FOLLOWUP:[1 week]],PROVIDER:[TOKEN:[[id number]],FOLLOWUP:[1-3 days]] PROVIDER:[TOKEN:[[id number]],FOLLOWUP:[1 week]],PROVIDER:[TOKEN:[[id number]],FOLLOWUP:[1 week]],PROVIDER:[TOKEN:[[id number]],FOLLOWUP:[1-3 days]],PROVIDER:[TOKEN:[[id number]],FOLLOWUP:[1-3 days]]

## 2024-01-04 DIAGNOSIS — Z76.89 ENCOUNTER TO ESTABLISH CARE: Primary | ICD-10-CM

## 2024-01-05 ENCOUNTER — TELEPHONE (OUTPATIENT)
Dept: ORTHOPEDIC SURGERY | Age: 64
End: 2024-01-05

## 2024-01-05 NOTE — TELEPHONE ENCOUNTER
Called and LVM for pt's . Stated that we could not rx any meds as we have not treated her for this issue or seen her in almost a year. Advised them to call PCP or treating provider.

## 2024-01-05 NOTE — TELEPHONE ENCOUNTER
She is being referred to a neurologist but hasn't been sleeping and she is wondering if something can be sent in while they wait on an appointment with neurology. Please call her

## 2024-02-13 ENCOUNTER — OFFICE VISIT (OUTPATIENT)
Dept: ORTHOPEDIC SURGERY | Age: 64
End: 2024-02-13
Payer: COMMERCIAL

## 2024-02-13 DIAGNOSIS — Z09 FOLLOW-UP EXAMINATION AFTER ORTHOPEDIC SURGERY: ICD-10-CM

## 2024-02-13 DIAGNOSIS — Z96.612 PRESENCE OF LEFT ARTIFICIAL SHOULDER JOINT: Primary | ICD-10-CM

## 2024-02-13 PROCEDURE — 99212 OFFICE O/P EST SF 10 MIN: CPT | Performed by: ORTHOPAEDIC SURGERY

## 2024-02-13 NOTE — PROGRESS NOTES
Progress Notes by Dm Tavera Jr., MD at 02/08/22 0945                    Author: Dm Tavera Jr., MD  Service: --  Author Type: Physician       Filed: 02/08/22 1033  Encounter Date: 2/8/2022  Status: Signed                                  Name: Monalisa Cuellar   YOB: 1960   Gender: female   MRN: [id number]            HPI: Monalisa Cuellar is a  63 y.o. female right-hand-dominant female 4 years status post I&D removal of implant left  shoulder [place] reverse left total shoulder arthroplasty with a proximal humeral osteotomy and revision reverse left total shoulder arthroplasty with a longstem delta xtend prosthesis biceps tenodesis latissimus dorsi and teres major tendon transfers.   She returns noting that she is doing fantastic.  She had had a previous reverse left total shoulder arthroplasty for a proximal humerus fracture done elsewhere.  She returns noting that she is doing fantastic.  she can now unsnap her bra.  She returns and notes that she still has a fantastic result         ROS/Meds/PSH/PMH/FH/SH: A ten system review of systems was performed and is negative other than what is in the HPI.             Tobacco:  reports that she has never smoked. She has never used smokeless tobacco.   Visit Vitals        Ht  5' 6\" (1.676 m)        Wt        BMI            Physical Examination:   She is an awake alert pleasant female ambulating without difficulty   She has a full range of cervical spine motion without radicular findings      Her left shoulder has a well-healed deltopectoral incision   The left shoulder has 0 to 170 degrees of active and 0 to 170 degrees passive forward elevation.    Internal rotation is to T6.   External rotation is to 60 degrees at the side.    In the 90 degree abducted position 90 degrees of external and 90 degrees internal rotation   The AC joint is non-tender   SC joint is non-tender.    Greater tuberosity is non-tender.   negative biceps   Negative

## 2024-03-16 ENCOUNTER — TRANSCRIBE ORDERS (OUTPATIENT)
Dept: SCHEDULING | Age: 64
End: 2024-03-16

## 2024-03-16 DIAGNOSIS — Z12.31 SCREENING MAMMOGRAM FOR HIGH-RISK PATIENT: Primary | ICD-10-CM

## 2024-04-01 ENCOUNTER — OFFICE VISIT (OUTPATIENT)
Dept: AUDIOLOGY | Age: 64
End: 2024-04-01
Payer: COMMERCIAL

## 2024-04-01 ENCOUNTER — OFFICE VISIT (OUTPATIENT)
Dept: ENT CLINIC | Age: 64
End: 2024-04-01
Payer: COMMERCIAL

## 2024-04-01 VITALS
HEIGHT: 66 IN | WEIGHT: 160 LBS | SYSTOLIC BLOOD PRESSURE: 110 MMHG | BODY MASS INDEX: 25.71 KG/M2 | DIASTOLIC BLOOD PRESSURE: 72 MMHG

## 2024-04-01 DIAGNOSIS — J34.3 HYPERTROPHY OF INFERIOR NASAL TURBINATE: Chronic | ICD-10-CM

## 2024-04-01 DIAGNOSIS — H90.3 SENSORINEURAL HEARING LOSS, BILATERAL: Primary | ICD-10-CM

## 2024-04-01 DIAGNOSIS — J34.2 NASAL SEPTAL DEVIATION: Chronic | ICD-10-CM

## 2024-04-01 DIAGNOSIS — H93.13 TINNITUS OF BOTH EARS: Primary | Chronic | ICD-10-CM

## 2024-04-01 PROCEDURE — 92557 COMPREHENSIVE HEARING TEST: CPT | Performed by: AUDIOLOGIST

## 2024-04-01 PROCEDURE — 92567 TYMPANOMETRY: CPT | Performed by: AUDIOLOGIST

## 2024-04-01 PROCEDURE — 99203 OFFICE O/P NEW LOW 30 MIN: CPT | Performed by: PHYSICIAN ASSISTANT

## 2024-04-01 RX ORDER — DULOXETIN HYDROCHLORIDE 30 MG/1
CAPSULE, DELAYED RELEASE ORAL DAILY
COMMUNITY
Start: 2024-01-18

## 2024-04-01 RX ORDER — AMITRIPTYLINE HYDROCHLORIDE 10 MG/1
10 TABLET, FILM COATED ORAL NIGHTLY
COMMUNITY
Start: 2024-01-05

## 2024-04-01 RX ORDER — FLUOXETINE 10 MG/1
CAPSULE ORAL
COMMUNITY
Start: 2024-01-26

## 2024-04-01 RX ORDER — LORAZEPAM 0.5 MG/1
TABLET ORAL
COMMUNITY
Start: 2024-01-14

## 2024-04-01 RX ORDER — MIRTAZAPINE 15 MG/1
15 TABLET, FILM COATED ORAL NIGHTLY
COMMUNITY
Start: 2024-02-01

## 2024-04-01 RX ORDER — DULOXETIN HYDROCHLORIDE 20 MG/1
20 CAPSULE, DELAYED RELEASE ORAL 2 TIMES DAILY
COMMUNITY
Start: 2024-01-19

## 2024-04-01 ASSESSMENT — ENCOUNTER SYMPTOMS
RESPIRATORY NEGATIVE: 1
EYES NEGATIVE: 1
ALLERGIC/IMMUNOLOGIC NEGATIVE: 1
GASTROINTESTINAL NEGATIVE: 1

## 2024-04-01 NOTE — PROGRESS NOTES
Monalisa Cuellar is a 63 y.o. female presents today with c/o tinnitus. She is having some nasal allergies. No history of noise exposure, she doesn't notice any hearing loss. No pulsatile quality. Tinnitus began years ago. Louder since her fall, in Sept when she broke her vertebrae. Her stress has been high and sleep disturbed. She doesn't use caffeine or alcohol and limites NSAID use.     Audiogram:     Left ear:  normal sloping to severe SNHL  Right ear: normal sloping to severe SNHL  Discrimination score: Right ear 100 % and Left ear 100 %   Tympanogram: Right ear Type Ad and Left ear Type C.       Chief Complaint   Patient presents with    New Patient    Tinnitus     Bilateral tinnitus.  Patient states it is worse in the right ear than the left.  Describes the sound as buzzing most of the time but can also sound like crickets.  Sounds amplifies when there is a lot of background noise and at night when she lays down.  Denies pain, drainage, and itching.  Patient fell in September and broke to vertebrae and states that it got worse after that.  Had the tinnitus before fall but more amplified since fall.         Patient Active Problem List   Diagnosis    Acute hypoxemic respiratory failure due to COVID-19 ([facility])    URI, acute    Pain due to left shoulder joint prosthesis ([facility])    SNHL (sensorineural hearing loss)    Allergic rhinitis    Fatigue    Artificial joint pain ([facility])    S/P reverse total shoulder arthroplasty, left    Arthropathy, transient, shoulder, left        Reviewed and updated this visit by provider:  Tobacco  Allergies  Meds  Problems  Med Hx  Surg Hx  Fam Hx         Review of Systems   Constitutional: Negative.    HENT:  Positive for tinnitus.    Eyes: Negative.    Respiratory: Negative.     Cardiovascular: Negative.    Gastrointestinal: Negative.    Endocrine: Negative.    Genitourinary: Negative.    Musculoskeletal: Negative.    Skin: Negative.    Allergic/Immunologic: Negative.

## 2024-04-01 NOTE — PROGRESS NOTES
AUDIOLOGY EVALUATION    Monalisa Cuellar had Tympanometry and Audiometry performed today.    The patient reports tinnitus.     Results as follows:    Tympanometry    Type C -  on left  Type Ad -  on right    Audiometry    Test Performed - Comprehensive Audiogram    Type of Loss - Right Ear: abnormal hearing: degree of loss is normal to severe sensorineural hearing loss                           Left Ear: abnormal hearing: degree of loss is normal to severe sensorineural hearing loss     SRT   Measurement Right Ear Left Ear   Value 20 20   Unit dB dB     Discrimination  Measurement Right Ear Left Ear   Value 100% 100%   Unit dB dB     Recommend  Annual audios    Elle Bermeo [facility]  Audiologist

## 2024-10-11 ENCOUNTER — TRANSCRIBE ORDERS (OUTPATIENT)
Dept: SCHEDULING | Age: 64
End: 2024-10-11

## 2024-10-11 DIAGNOSIS — Z12.31 VISIT FOR SCREENING MAMMOGRAM: Primary | ICD-10-CM

## 2024-12-30 ENCOUNTER — HOSPITAL ENCOUNTER (OUTPATIENT)
Dept: MAMMOGRAPHY | Age: 64
Discharge: HOME | End: 2025-01-02
Payer: COMMERCIAL

## 2024-12-30 DIAGNOSIS — Z12.31 VISIT FOR SCREENING MAMMOGRAM: ICD-10-CM

## 2024-12-30 PROCEDURE — 77063 BREAST TOMOSYNTHESIS BI: CPT

## 2025-02-18 ENCOUNTER — OFFICE VISIT (OUTPATIENT)
Dept: ORTHOPEDIC SURGERY | Age: 65
End: 2025-02-18
Payer: COMMERCIAL

## 2025-02-18 DIAGNOSIS — Z09 FOLLOW-UP EXAMINATION AFTER ORTHOPEDIC SURGERY: ICD-10-CM

## 2025-02-18 DIAGNOSIS — Z96.612 PRESENCE OF LEFT ARTIFICIAL SHOULDER JOINT: Primary | ICD-10-CM

## 2025-02-18 PROCEDURE — 99212 OFFICE O/P EST SF 10 MIN: CPT | Performed by: ORTHOPAEDIC SURGERY

## 2025-02-18 NOTE — PROGRESS NOTES
negative lift-off sign   Negative belly press sign   Negative bear huggers sign   negative drop sign   negative hornblower's sign   No posterior glenohumeral joint line tenderness.    No evident excessive external rotation   Rotator cuff strength is 5/5.   negative external rotation stress test.    Negative empty can sign   There is no evident anterior or posterior apprehension with a negative sulcus sign.    No instability   negative external and internal Rotation lag sign   Neurovascularly intact.         Data Reviewed:                XR: AP, Y and axillary views left shoulder       Clinical Indication               1.            2.                     Report: AP, Y and axillary views left shoulder demonstrate a reverse  longstem total shoulder arthroplasty in excellent position      Impression: Status post reverse longstem left total shoulder arthroplasty       Dm Tavera Jr., MD                    Impression:      1. Presence of left artificial shoulder joint    2. Follow-up examination after orthopedic surgery             Status post I&D removal of implant left shoulder [place] reverse left total shoulder arthroplasty with a proximal humeral osteotomy and revision  reverse left total shoulder arthroplasty with a longstem delta xtend prosthesis biceps tenodesis latissimus dorsi and teres major tendon transfer 5 years    Status post reverse left total shoulder arthroplasty for proximal humerus fracture done elsewhere    Carpal tunnel syndrome right wrist    Cervical spondylosis most marked at C5-6, C6-7      Plan:    The patient continues to enjoy a phenomenal result.  She has known cervical spondylosis and that most likely is the cause of her current discomfort.  We will observe that for now.  She will contact me if her neck gives her more trouble.  I will recheck her back in 1 year with new AP, Y and axillary views left shoulder   2 This is a self limited or minor problem      Follow up:        Follow-up and

## (undated) DEVICE — HANDPIECE SET WITH COAXIAL HIGH FLOW TIP AND SUCTION TUBE: Brand: INTERPULSE

## (undated) DEVICE — GUIDEPIN ORTH L300MM DIA1.5MM GLENOSPHERE FOR DELT XTEND

## (undated) DEVICE — MEDI-VAC YANKAUER SUCTION HANDLE W/BULBOUS TIP: Brand: CARDINAL HEALTH

## (undated) DEVICE — GUIDEPIN ORTH L190MM DIA2.5MM METAGLENE CTRL FOR DELT XTEND

## (undated) DEVICE — REM POLYHESIVE ADULT PATIENT RETURN ELECTRODE: Brand: VALLEYLAB

## (undated) DEVICE — BANDAGE COMPR SELF ADH 5 YDX4 IN TAN STRL PREMIERPRO LF

## (undated) DEVICE — Device

## (undated) DEVICE — COVER,MAYO STAND,STERILE: Brand: MEDLINE

## (undated) DEVICE — ABDOMINAL PAD: Brand: DERMACEA

## (undated) DEVICE — SUTURE 5 MERS GRN 30 TO 40 IN D9211

## (undated) DEVICE — DISPOSABLE DRAPE, STERILE, FOR A CDS-3060 5 FOOT TABLE: Brand: PEDIGO PRODUCTS, INC.

## (undated) DEVICE — SYRINGE CATH TIP 50ML

## (undated) DEVICE — SOLUTION IRRIG 3000ML 0.9% SOD CHL FLX CONT 0797208] ICU MEDICAL INC]

## (undated) DEVICE — BANDAGE,GAUZE,BULKEE II,4.5"X4.1YD,STRL: Brand: MEDLINE

## (undated) DEVICE — SINGLE BASIN: Brand: CARDINAL HEALTH

## (undated) DEVICE — ARGYLE SIGMOID SURGICAL SUCTION INSTRUMENT 23 FR/CH (7.7 MM): Brand: ARGYLE

## (undated) DEVICE — PAD,ABDOMINAL,5"X9",ST,LF,25/BX: Brand: MEDLINE INDUSTRIES, INC.

## (undated) DEVICE — SPONGE LAP 18X18IN STRL -- 5/PK

## (undated) DEVICE — STOCKINETTE TUBE 6X48 -- MEDICHOICE

## (undated) DEVICE — TOTAL SHOULDER DR POSTA: Brand: MEDLINE INDUSTRIES, INC.

## (undated) DEVICE — OSCILLATING TIP SAW CARTRIDGE: Brand: STRYKER PRECISION

## (undated) DEVICE — SUTURE PROL SZ 2-0 L18IN NONABSORBABLE BLU FS L26MM 3/8 CIR 8685H

## (undated) DEVICE — ELECTRODE NDL 2.8IN COAT VALLEYLAB

## (undated) DEVICE — STRIP,CLOSURE,WOUND,MEDI-STRIP,1/2X4: Brand: MEDLINE

## (undated) DEVICE — GOWN,REINFORCED,POLY,AURORA,XXLARGE,STR: Brand: MEDLINE

## (undated) DEVICE — SUTURE ETHBND EXCEL SZ 2 L27IN NONABSORBABLE GRN WHT MO-7 D7485

## (undated) DEVICE — RESTRICTOR CEM DIA10MM UNIV FEM CNL UHMWPE BIOSTP G
Type: IMPLANTABLE DEVICE | Site: SHOULDER | Status: NON-FUNCTIONAL
Removed: 2020-02-06

## (undated) DEVICE — SUTURE PROL SZ 0 L30IN NONABSORBABLE BLU FSLX L36MM 3/8 CIR 8690H

## (undated) DEVICE — DRAPE,TOP,102X53,STERILE: Brand: MEDLINE

## (undated) DEVICE — BUTTON SWITCH PENCIL BLADE ELECTRODE, HOLSTER: Brand: EDGE

## (undated) DEVICE — DRAPE TWL SURG 16X26IN BLU ORB04] ALLCARE INC]

## (undated) DEVICE — SOLUTION IRRIG 1000ML H2O STRL BLT

## (undated) DEVICE — (D)PREP SKN CHLRAPRP APPL 26ML -- CONVERT TO ITEM 371833

## (undated) DEVICE — DRAPE SHT 3 QTR PROXIMA 53X77 --

## (undated) DEVICE — DRESSING,GAUZE,XEROFORM,CURAD,5"X9",ST: Brand: CURAD

## (undated) DEVICE — 4.0MM ROUND FAST CUTTING BUR

## (undated) DEVICE — CARDINAL HEALTH FLEXIBLE LIGHT HANDLE COVER: Brand: CARDINAL HEALTH

## (undated) DEVICE — SHEET, DRAPE, SPLIT, STERILE: Brand: MEDLINE

## (undated) DEVICE — SUTURE VCRL SZ 0 L27IN ABSRB UD L36MM CP-1 1/2 CIR REV CUT J267H

## (undated) DEVICE — GAUZE,SPONGE,4"X4",16PLY,STRL,LF,10/TRAY: Brand: MEDLINE